# Patient Record
Sex: FEMALE | Race: WHITE | NOT HISPANIC OR LATINO | Employment: FULL TIME | URBAN - METROPOLITAN AREA
[De-identification: names, ages, dates, MRNs, and addresses within clinical notes are randomized per-mention and may not be internally consistent; named-entity substitution may affect disease eponyms.]

---

## 2017-04-10 ENCOUNTER — HOSPITAL ENCOUNTER (OUTPATIENT)
Dept: MAMMOGRAPHY | Facility: CLINIC | Age: 52
Discharge: HOME | End: 2017-04-10
Attending: NURSE PRACTITIONER | Admitting: NURSE PRACTITIONER
Payer: COMMERCIAL

## 2017-04-10 DIAGNOSIS — Z12.31 VISIT FOR SCREENING MAMMOGRAM: ICD-10-CM

## 2017-04-10 PROCEDURE — G0202 SCR MAMMO BI INCL CAD: HCPCS

## 2017-04-10 PROCEDURE — 77063 BREAST TOMOSYNTHESIS BI: CPT

## 2017-04-13 ENCOUNTER — HOSPITAL ENCOUNTER (OUTPATIENT)
Dept: ULTRASOUND IMAGING | Facility: CLINIC | Age: 52
Discharge: HOME | End: 2017-04-13
Attending: NURSE PRACTITIONER | Admitting: NURSE PRACTITIONER
Payer: COMMERCIAL

## 2017-04-13 DIAGNOSIS — R92.8 ABNORMAL MAMMOGRAM: ICD-10-CM

## 2017-04-13 PROCEDURE — 76642 ULTRASOUND BREAST LIMITED: CPT | Mod: 50

## 2017-10-18 ENCOUNTER — OFFICE VISIT (OUTPATIENT)
Dept: PODIATRY | Facility: CLINIC | Age: 52
End: 2017-10-18
Payer: COMMERCIAL

## 2017-10-18 ENCOUNTER — RADIANT APPOINTMENT (OUTPATIENT)
Dept: GENERAL RADIOLOGY | Facility: CLINIC | Age: 52
End: 2017-10-18
Attending: PODIATRIST
Payer: COMMERCIAL

## 2017-10-18 VITALS — TEMPERATURE: 96.8 F | BODY MASS INDEX: 29.23 KG/M2 | WEIGHT: 165 LBS | HEIGHT: 63 IN

## 2017-10-18 DIAGNOSIS — M20.11 HALLUX VALGUS (ACQUIRED), RIGHT FOOT: ICD-10-CM

## 2017-10-18 DIAGNOSIS — M20.11 HALLUX VALGUS (ACQUIRED), RIGHT FOOT: Primary | ICD-10-CM

## 2017-10-18 PROCEDURE — 99213 OFFICE O/P EST LOW 20 MIN: CPT | Performed by: PODIATRIST

## 2017-10-18 PROCEDURE — 73630 X-RAY EXAM OF FOOT: CPT | Mod: TC

## 2017-10-18 ASSESSMENT — PAIN SCALES - GENERAL: PAINLEVEL: EXTREME PAIN (8)

## 2017-10-18 NOTE — LETTER
10/18/2017        RE: Dot Kenny  [address] [facility] [address]        HPI:  Dot Kenny is a 52 year old female who is seen in consultation at the request of self.    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  Radiates from ankle to tip of toes, tingling, feel a bump from hardware, throbbing, pain 8  Presents today with Superfeet inserts and athletic shoes    Works as a Teacher at [employer].    Weight management plan: Patient was referred to their PCP to discuss a diet and exercise plan.     ROS:  10 point ROS neg other than the symptoms noted above in the HPI.    PAST MEDICAL HISTORY:   Past Medical History:   Diagnosis Date     ASCUS favor benign 3/2016    Neg HPV        PAST SURGICAL HISTORY:   Past Surgical History:   Procedure Laterality Date     BUNIONECTOMY Right 6/14/2016    Procedure: BUNIONECTOMY;  Surgeon: Jean Carlos Coleman DPM;  Location: [facility]     C APPENDECTOMY  2000    appendectomy         MEDICATIONS:   Current Outpatient Prescriptions:      NO ACTIVE MEDICATIONS, ., Disp: , Rfl:      order for DME, Dynaflex insert, Disp: 1 Units, Rfl: 0     order for DME, Knee Walker Length of use: Three months, Disp: 1 Units, Rfl: 0     ibuprofen (ADVIL,MOTRIN) 800 MG tablet, Take 1 tablet (800 mg) by mouth every 8 hours as needed for pain (for menstrual cramps), Disp: 30 tablet, Rfl: 3     ALLERGIES:    Allergies   Allergen Reactions     Cefzil [Cefprozil]      rash     Sulfa Drugs      Rash          SOCIAL HISTORY:   Social History     Social History     Marital status:      Spouse name: N/A     Number of children: N/A     Years of education: N/A     Occupational History     Not on file.     Social History Main Topics      "Smoking status: Never Smoker     Smokeless tobacco: Never Used     Alcohol use No     Drug use: No     Sexual activity: Yes     Partners: Male      Comment:  vasectomy     Other Topics Concern     Parent/Sibling W/ Cabg, Mi Or Angioplasty Before 65f 55m? No      Service No     Blood Transfusions No     Caffeine Concern No     Occupational Exposure No     Hobby Hazards No     Sleep Concern No     Stress Concern No     Weight Concern No     Special Diet No     Back Care No     Exercise No     Seat Belt Yes     Self-Exams Yes     Social History Narrative        FAMILY HISTORY:   Family History   Problem Relation Age of Onset     Hypertension Father      Thyroid Disease Paternal Grandfather      DIABETES No family hx of      Breast Cancer No family hx of      Cancer - colorectal No family hx of      Alcohol/Drug No family hx of      Lipids No family hx of         EXAM:Vitals: Temp 96.8  F (36  C) (Temporal)  Ht 5' 3.39\" (1.61 m)  Wt 165 lb (74.8 kg)  BMI 28.87 kg/m2  BMI= Body mass index is 28.87 kg/(m^2).    General appearance: Patient is alert and fully cooperative with history & exam.  No sign of distress is noted during the visit.     Psychiatric: Affect is pleasant & appropriate.  Patient appears motivated to improve health.     Respiratory: Breathing is regular & unlabored while sitting.     HEENT: Hearing is intact to spoken word.  Speech is clear.  No gross evidence of visual impairment that would impact ambulation.     Vascular: DP & PT pulses are intact & regular bilaterally.  No significant edema or varicosities noted.  CFT and skin temperature is normal to both lower extremities.     Neurologic: Lower extremity sensation is intact to light touch.  No evidence of weakness or contracture in the lower extremities.  No evidence of neuropathy.    Dermatologic: Skin is intact to both lower extremities with adequate texture, turgor and tone about the integument.  No paronychia or evidence of soft " tissue infection is noted.     Musculoskeletal: Patient is ambulatory without assistive device or brace.  Generalized pes valgus noted bilateral. The patient does have a subtle prominence about the plantar medial right midfoot. She has some achy or fullness discomfort with firm palpation about the proximal first metatarsal and cuneiform joint. There is no palpable induration or edema.    Radiographs: 3 views right foot demonstrate no fractured hardware. Hardware is moderately congested about the first cuneiform and base of the first metatarsal as is typical of Lapidus bunionectomy. Hardware is also congested about the cuneiform navicular joint which is also fairly typical.     ASSESSMENT:       ICD-10-CM    1. Hallux valgus (acquired), right foot M20.11 XR Foot Right G/E 3 Views        PLAN:  Reviewed patient's chart in [facility].      10/18/2017   We discussed treatment options with the patient. I recommended much improved shoe gear and written instructions dispensed. She was appreciative of this and admits that she was instructed by her surgeon to improve her shoe gear.    She also had questions about the procedure and outcome, and wonders if it was done and correctly. She notes it was the surgeon's first time with this plate. Upon reviewing preop and postop radiographs this appears to be an excellent result with appropriate reduction of intermetatarsal angle and HAV angle and excellent sesamoid position. The hardware has not fractured however she still may be developing some symptoms associated with the hardware. She also has a fairly flatfoot and some of her aching discomfort through the midfoot may be associated with a moderately flat foot. Orthotics and it improved shoe gear may be helpful for her.    We also discussed another option would be to remove the hardware as this may also reduce some discomfort and congestion about the medial cuneiform. We discussed the postoperative course and expectations. I recommended  that she follow-up with her surgeon to achieve this unless she had a strong preference otherwise.  She would like to discuss this with her family and will follow up as needed.    Chirag Ramirez DPM    Please schedule for surgery, pre op H&P, and post ops.    Surgery:  Patient Name:  Dot Kenny ([id number])  Procedure:   excision of hardware right foot  Diagnosis:    Painful retained orthopedic hardware right foot   Assistant: first assist  Surgeon:  Chirag Ramirez DPM  Anesthesia:  General or MAC  PT type:  Same Day Surgery  Time needed: 60 minutes  Patient position:  lying supine  Mini fluoro:  Yes  C-arm:  no  Equipment:  Hardware removal set with plantar Lapidus plate  Anticoagulation:  No  Vendor:  no  Surgeon Notes:     Post op appts:    5-7 days po  12-15 days po  approx 4 weeks    Expected work restrictions:  full weight bearing in post op shoe    [facility] Discussed:  NO        Sincerely,        Chirag Ramirez DPM

## 2017-10-18 NOTE — PROGRESS NOTES
HPI:  Dot Kenny is a 52 year old female who is seen in consultation at the request of self.    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  Radiates from ankle to tip of toes, tingling, feel a bump from hardware, throbbing, pain 8  Presents today with Superfeet inserts and athletic shoes    Works as a Teacher at [employer].    Weight management plan: Patient was referred to their PCP to discuss a diet and exercise plan.     ROS:  10 point ROS neg other than the symptoms noted above in the HPI.    PAST MEDICAL HISTORY:   Past Medical History:   Diagnosis Date     ASCUS favor benign 3/2016    Neg HPV        PAST SURGICAL HISTORY:   Past Surgical History:   Procedure Laterality Date     BUNIONECTOMY Right 6/14/2016    Procedure: BUNIONECTOMY;  Surgeon: Jean Carlos Coleman DPM;  Location: [facility] [place]     C APPENDECTOMY  2000    appendectomy         MEDICATIONS:   Current Outpatient Prescriptions:      NO ACTIVE MEDICATIONS, ., Disp: , Rfl:      order for DME, Dynaflex insert, Disp: 1 Units, Rfl: 0     order for DME, Knee Walker Length of use: Three months, Disp: 1 Units, Rfl: 0     ibuprofen (ADVIL,MOTRIN) 800 MG tablet, Take 1 tablet (800 mg) by mouth every 8 hours as needed for pain (for menstrual cramps), Disp: 30 tablet, Rfl: 3     ALLERGIES:    Allergies   Allergen Reactions     Cefzil [Cefprozil]      rash     Sulfa Drugs      Rash          SOCIAL HISTORY:   Social History     Social History     Marital status:      Spouse name: N/A     Number of children: N/A     Years of education: N/A     Occupational History     Not on file.     Social History Main Topics     Smoking status: Never Smoker     Smokeless tobacco: Never Used     Alcohol use No     Drug  "use: No     Sexual activity: Yes     Partners: Male      Comment:  vasectomy     Other Topics Concern     Parent/Sibling W/ Cabg, Mi Or Angioplasty Before 65f 55m? No      Service No     Blood Transfusions No     Caffeine Concern No     Occupational Exposure No     Hobby Hazards No     Sleep Concern No     Stress Concern No     Weight Concern No     Special Diet No     Back Care No     Exercise No     Seat Belt Yes     Self-Exams Yes     Social History Narrative        FAMILY HISTORY:   Family History   Problem Relation Age of Onset     Hypertension Father      Thyroid Disease Paternal Grandfather      DIABETES No family hx of      Breast Cancer No family hx of      Cancer - colorectal No family hx of      Alcohol/Drug No family hx of      Lipids No family hx of         EXAM:Vitals: Temp 96.8  F (36  C) (Temporal)  Ht 5' 3.39\" (1.61 m)  Wt 165 lb (74.8 kg)  BMI 28.87 kg/m2  BMI= Body mass index is 28.87 kg/(m^2).    General appearance: Patient is alert and fully cooperative with history & exam.  No sign of distress is noted during the visit.     Psychiatric: Affect is pleasant & appropriate.  Patient appears motivated to improve health.     Respiratory: Breathing is regular & unlabored while sitting.     HEENT: Hearing is intact to spoken word.  Speech is clear.  No gross evidence of visual impairment that would impact ambulation.     Vascular: DP & PT pulses are intact & regular bilaterally.  No significant edema or varicosities noted.  CFT and skin temperature is normal to both lower extremities.     Neurologic: Lower extremity sensation is intact to light touch.  No evidence of weakness or contracture in the lower extremities.  No evidence of neuropathy.    Dermatologic: Skin is intact to both lower extremities with adequate texture, turgor and tone about the integument.  No paronychia or evidence of soft tissue infection is noted.     Musculoskeletal: Patient is ambulatory without assistive " device or brace.  Generalized pes valgus noted bilateral. The patient does have a subtle prominence about the plantar medial right midfoot. She has some achy or fullness discomfort with firm palpation about the proximal first metatarsal and cuneiform joint. There is no palpable induration or edema.    Radiographs: 3 views right foot demonstrate no fractured hardware. Hardware is moderately congested about the first cuneiform and base of the first metatarsal as is typical of Lapidus bunionectomy. Hardware is also congested about the cuneiform navicular joint which is also fairly typical.     ASSESSMENT:       ICD-10-CM    1. Hallux valgus (acquired), right foot M20.11 XR Foot Right G/E 3 Views        PLAN:  Reviewed patient's chart in [employer].      10/18/2017   We discussed treatment options with the patient. I recommended much improved shoe gear and written instructions dispensed. She was appreciative of this and admits that she was instructed by her surgeon to improve her shoe gear.    She also had questions about the procedure and outcome, and wonders if it was done and correctly. She notes it was the surgeon's first time with this plate. Upon reviewing preop and postop radiographs this appears to be an excellent result with appropriate reduction of intermetatarsal angle and HAV angle and excellent sesamoid position. The hardware has not fractured however she still may be developing some symptoms associated with the hardware. She also has a fairly flatfoot and some of her aching discomfort through the midfoot may be associated with a moderately flat foot. Orthotics and it improved shoe gear may be helpful for her.    We also discussed another option would be to remove the hardware as this may also reduce some discomfort and congestion about the medial cuneiform. We discussed the postoperative course and expectations. I recommended that she follow-up with her surgeon to achieve this unless she had a strong preference  otherwise.  She would like to discuss this with her family and will follow up as needed.    Chirag Ramirez DPM    Please schedule for surgery, pre op H&P, and post ops.    Surgery:  Patient Name:  Dot Kenny ([id number])  Procedure:   excision of hardware right foot  Diagnosis:    Painful retained orthopedic hardware right foot   Assistant: first assist  Surgeon:  Chirag Ramirez DPM  Anesthesia:  General or MAC  PT type:  Same Day Surgery  Time needed: 60 minutes  Patient position:  lying supine  Mini fluoro:  Yes  C-arm:  no  Equipment:  Hardware removal set with plantar Lapidus plate  Anticoagulation:  No  Vendor:  no  Surgeon Notes:     Post op appts:    5-7 days po  12-15 days po  approx 4 weeks    Expected work restrictions:  full weight bearing in post op shoe    [facility] Discussed:  NO

## 2017-10-18 NOTE — NURSING NOTE
"Chief Complaint   Patient presents with     Consult     Right foot pain post surgery; DOS 6/4/2016 - Lapidus Bunionectomy Right Foot by Jean Carlos Coleman DPM       Initial Temp 96.8  F (36  C) (Temporal)  Ht 5' 3.39\" (1.61 m)  Wt 165 lb (74.8 kg)  BMI 28.87 kg/m2 Estimated body mass index is 28.87 kg/(m^2) as calculated from the following:    Height as of this encounter: 5' 3.39\" (1.61 m).    Weight as of this encounter: 165 lb (74.8 kg).  BP completed using cuff size: NA (Not Taken)  Medication Reconciliation: complete    Vonda Hoffman CMA, October 18, 2017    "

## 2017-10-18 NOTE — PATIENT INSTRUCTIONS
Reliable shoe stores: To maximize your experience and provide the best possible fit.  Be sure to show them your foot concerns and tell them Dr. Ramirez sent you.      Stores listed in bold have only athletic shoes, and stores that are not bold are mostly casual or variety of shoes    [employer]  [address] [place] [address]  [phone]    [employer] [place]  [address] [place] [address]  [phone]     [employer] [address]any [address]rie  [address] [place] [address]  [phone]    [employer]  [address] [facility]  [facility] [address]  [phone]    [employer]  [address] [place], [place] [address] [phone]    [employer]  [address] [place] [address]  [phone]                         [employer] Locations:     [address] [place] [address]   [phone]     [address] [place] [address] [address] [place] [address]   [phone]     [address] [place] [address]   [phone]     [address] [facility] [place] [address]   [phone]     [address] [place] [address]   [phone]     [address] [place] [address]   [phone]     [address] [facility] [address]   [place] [address]   [phone]     [address] [facility]   [place] [address]   [phone]             How to find reasonable shoes          The correct width    Correct Fitting    Correct Length      Foot Distortion    Posture Distortion                          Torsional Rigidity      Grasp behind the heel and underneath the foot and twist      Bad    Excessive torsion/twist in midfoot     Less torsion/twist in midfoot is better                   Heel Counter Rigidity      Grasp just above   midsole and squeeze      Bad    Soft heel counter      Good    Rigid Heel Counter      Flexion Rigidity      Grasp shoe and bend from forefoot to rearfoot

## 2017-10-18 NOTE — MR AVS SNAPSHOT
After Visit Summary   10/18/2017    Dot Kenny    MRN: [id number]           Patient Information     Date Of Birth          1965        Visit Information        Provider Department      10/18/2017 3:00 PM Chirag Ramirez, [facility]        Today's Diagnoses     Hallux valgus (acquired), right foot    -  1      Care Instructions    Reliable shoe stores: To maximize your experience and provide the best possible fit.  Be sure to show them your foot concerns and tell them Dr. Ramirez sent you.      Stores listed in bold have only athletic shoes, and stores that are not bold are mostly casual or variety of shoes    [employer]  [address] [place] [address]  [phone]    [employer]  [address] [place] [address]  [phone]    [employer]  [address] [place] [address]  [phone]    [employer]  [address] [facility]  [facility] [address]  [phone]    [employer]  [address] [place] [address]  [phone]    [employer]  [address] [place] [address]  [phone]                         [employer] Locations:     [address] [place] [address]   [phone]     [address] [place] [address]   [phone]     [address] [place] [address]   [phone]     [address] [place] [address]   [phone]     [address] [place] [address]   [phone]     [address] [place] [address]   [phone]     [address]  [place] [facility] [address]   [place] [address]   [phone]     [address]   [place], [place] [address] [phone]             How to find reasonable shoes          The correct width    Correct Fitting    Correct Length      Foot Distortion    Posture Distortion                          Torsional Rigidity      Grasp behind the heel and underneath the foot and  "twist      Bad    Excessive torsion/twist in midfoot     Less torsion/twist in midfoot is better                   Heel Counter Rigidity      Grasp just above   midsole and squeeze      Bad    Soft heel counter      Good    Rigid Heel Counter      Flexion Rigidity      Grasp shoe and bend from forefoot to rearfoot                        Follow-ups after your visit        Who to contact     If you have questions or need follow up information about today's clinic visit or your schedule please contact [facility] directly at [phone].  Normal or non-critical lab and imaging results will be communicated to you by [employer], letter or phone within 4 business days after the clinic has received the results. If you do not hear from us within 7 days, please contact the clinic through [employer] or phone. If you have a critical or abnormal lab result, we will notify you by phone as soon as possible.  Submit refill requests through [employer] or call your pharmacy and they will forward the refill request to us. Please allow 3 business days for your refill to be completed.          Additional Information About Your Visit        [employer] Information     [employer] lets you send messages to your doctor, view your test results, renew your prescriptions, schedule appointments and more. To sign up, go to [email or web address] . Click on \"Log in\" on the left side of the screen, which will take you to the Welcome page. Then click on \"Sign up Now\" on the right side of the page.     You will be asked to enter the access code listed below, as well as some personal information. Please follow the directions to create your username and password.     Your access code is: [email or web address]  Expires: 2018  3:30 PM     Your access code will  in 90 days. If you need help or a new code, please call your [facility] or [phone].        Care EveryWhere ID     This is your Care EveryWhere ID. This could be used by other " "organizations to access your [facility] medical records  [id number]        Your Vitals Were     Temperature Height BMI (Body Mass Index)             96.8  F (36  C) (Temporal) 5' 3.39\" (1.61 m) 28.87 kg/m2          Blood Pressure from Last 3 Encounters:   06/14/16 106/69   06/07/16 107/71   03/17/16 98/68    Weight from Last 3 Encounters:   10/18/17 165 lb (74.8 kg)   08/31/16 145 lb (65.8 kg)   08/03/16 145 lb (65.8 kg)               Primary Care Provider Office Phone # Fax #    Chrissy Alamo, [phone]       [address] [facility] [address]        Equal Access to Services     ABNER PIMENTEL : Juancho rosarioo Sonirmal, waaxda luqadaha, qaybta kaalmada adeegyada, luis alfredo saleh . So [facility] [phone].    ATENCIÓN: Si habla español, tiene a cade disposición servicios gratuitos de asistencia lingüística. Llame al [phone].    We comply with applicable federal civil rights laws and [place] laws. We do not discriminate on the basis of race, color, national origin, age, disability, sex, sexual orientation, or gender identity.            Thank you!     Thank you for choosing [facility]  for your care. Our goal is always to provide you with excellent care. Hearing back from our patients is one way we can continue to improve our services. Please take a few minutes to complete the written survey that you may receive in the mail after your visit with us. Thank you!             Your Updated Medication List - Protect others around you: Learn how to safely use, store and throw away your medicines at www.disposemymeds.org.          This list is accurate as of: 10/18/17  3:30 PM.  Always use your most recent med list.                   Brand Name Dispense Instructions for use Diagnosis    ibuprofen 800 MG tablet    ADVIL/MOTRIN    30 tablet    Take 1 tablet (800 mg) by mouth every 8 hours as needed for pain (for menstrual cramps)    Dysmenorrhea       NO " ACTIVE MEDICATIONS      .        * order for DME     1 Units    Knee Walker Length of use: Three months        * order for DME     1 Units    Dynaflex insert    Right foot pain, S/P foot surgery, right       * Notice:  This list has 2 medication(s) that are the same as other medications prescribed for you. Read the directions carefully, and ask your doctor or other care provider to review them with you.

## 2018-02-10 ENCOUNTER — OFFICE VISIT (OUTPATIENT)
Dept: URGENT CARE | Facility: URGENT CARE | Age: 53
End: 2018-02-10
Payer: COMMERCIAL

## 2018-02-10 VITALS
SYSTOLIC BLOOD PRESSURE: 113 MMHG | OXYGEN SATURATION: 100 % | TEMPERATURE: 97.4 F | DIASTOLIC BLOOD PRESSURE: 71 MMHG | RESPIRATION RATE: 16 BRPM | WEIGHT: 165 LBS | HEART RATE: 81 BPM | BODY MASS INDEX: 28.87 KG/M2

## 2018-02-10 DIAGNOSIS — J03.01 ACUTE RECURRENT STREPTOCOCCAL TONSILLITIS: ICD-10-CM

## 2018-02-10 DIAGNOSIS — R07.0 THROAT PAIN: Primary | ICD-10-CM

## 2018-02-10 LAB
DEPRECATED S PYO AG THROAT QL EIA: ABNORMAL
SPECIMEN SOURCE: ABNORMAL

## 2018-02-10 PROCEDURE — 99213 OFFICE O/P EST LOW 20 MIN: CPT | Performed by: PHYSICIAN ASSISTANT

## 2018-02-10 PROCEDURE — 87880 STREP A ASSAY W/OPTIC: CPT | Performed by: PHYSICIAN ASSISTANT

## 2018-02-10 RX ORDER — PENICILLIN V POTASSIUM 500 MG/1
500 TABLET, FILM COATED ORAL 3 TIMES DAILY
Qty: 30 TABLET | Refills: 0 | Status: SHIPPED | OUTPATIENT
Start: 2018-02-10 | End: 2018-02-23

## 2018-02-10 ASSESSMENT — ENCOUNTER SYMPTOMS
NAUSEA: 0
DEPRESSION: 0
FREQUENCY: 0
FEVER: 1
CONSTIPATION: 0
CHILLS: 1
DIARRHEA: 0
HEADACHES: 1
PALPITATIONS: 0
BLURRED VISION: 0
BACK PAIN: 0
WEAKNESS: 1
EYE PAIN: 0
COUGH: 1
DIZZINESS: 0
ABDOMINAL PAIN: 0
SORE THROAT: 1
DYSURIA: 0

## 2018-02-10 NOTE — MR AVS SNAPSHOT
"              After Visit Summary   2/10/2018    Dot Kenny    MRN: [id number]           Patient Information     Date Of Birth          1965        Visit Information        Provider Department      2/10/2018 9:05 AM Brent Rodriguez PA-C [facility]        Today's Diagnoses     Throat pain    -  1    Acute recurrent streptococcal tonsillitis           Follow-ups after your visit        Follow-up notes from your care team     Return if symptoms worsen or fail to improve.      Who to contact     If you have questions or need follow up information about today's clinic visit or your schedule please contact [facility] directly at [phone].  Normal or non-critical lab and imaging results will be communicated to you by MyChart, letter or phone within 4 business days after the clinic has received the results. If you do not hear from us within 7 days, please contact the clinic through MyChart or phone. If you have a critical or abnormal lab result, we will notify you by phone as soon as possible.  Submit refill requests through [employer] or call your pharmacy and they will forward the refill request to us. Please allow 3 business days for your refill to be completed.          Additional Information About Your Visit        MyChart Information     [employer] lets you send messages to your doctor, view your test results, renew your prescriptions, schedule appointments and more. To sign up, go to [email or web address] . Click on \"Log in\" on the left side of the screen, which will take you to the Welcome page. Then click on \"Sign up Now\" on the right side of the page.     You will be asked to enter the access code listed below, as well as some personal information. Please follow the directions to create your username and password.     Your access code is: [email or web address]  Expires: 2018  9:51 AM     Your access code will  in 90 days. If you need help or " a new code, please call your [facility] clinic or [phone].        Care EveryWhere ID     This is your Care EveryWhere ID. This could be used by other organizations to access your [facility] medical records  [id number]        Your Vitals Were     Pulse Temperature Respirations Pulse Oximetry BMI (Body Mass Index)       81 97.4  F (36.3  C) (Tympanic) 16 100% 28.87 kg/m2        Blood Pressure from Last 3 Encounters:   02/10/18 113/71   06/14/16 106/69   06/07/16 107/71    Weight from Last 3 Encounters:   02/10/18 165 lb (74.8 kg)   10/18/17 165 lb (74.8 kg)   08/31/16 145 lb (65.8 kg)              We Performed the Following     Strep, Rapid Screen          Today's Medication Changes          These changes are accurate as of 2/10/18  9:51 AM.  If you have any questions, ask your nurse or doctor.               Start taking these medicines.        Dose/Directions    penicillin V potassium 500 MG tablet   Commonly known as:  VEETID   Used for:  Acute recurrent streptococcal tonsillitis   Started by:  Brent Rodriguez PA-C        Dose:  500 mg   Take 1 tablet (500 mg) by mouth 3 times daily   Quantity:  30 tablet   Refills:  0            Where to get your medicines      These medications were sent to [facility] [place] [id number]     Phone:  [phone]     penicillin V potassium 500 MG tablet                Primary Care Provider Office Phone # Fax #    Chrissy Alamo [phone]       [address] [place] [id number]        Equal Access to Services     [facility]: Hadii fannie garay hadasho Soomaali, waaxda luqadaha, qaybta kaalmada luis alfredo odonnell. So [facility] [phone].    ATENCIÓN: Si habla español, tiene a cade disposición servicios gratuitos de asistencia lingüística. Llame al [phone].    We comply with applicable federal civil rights laws and [place] laws. We do not  discriminate on the basis of race, color, national origin, age, disability, sex, sexual orientation, or gender identity.            Thank you!     Thank you for choosing [facility]  for your care. Our goal is always to provide you with excellent care. Hearing back from our patients is one way we can continue to improve our services. Please take a few minutes to complete the written survey that you may receive in the mail after your visit with us. Thank you!             Your Updated Medication List - Protect others around you: Learn how to safely use, store and throw away your medicines at www.disposemymeds.org.          This list is accurate as of 2/10/18  9:51 AM.  Always use your most recent med list.                   Brand Name Dispense Instructions for use Diagnosis    ibuprofen 800 MG tablet    ADVIL/MOTRIN    30 tablet    Take 1 tablet (800 mg) by mouth every 8 hours as needed for pain (for menstrual cramps)    Dysmenorrhea       NO ACTIVE MEDICATIONS      .        * order for DME     1 Units    Knee Walker Length of use: Three months        * order for DME     1 Units    Dynaflex insert    Right foot pain, S/P foot surgery, right       penicillin V potassium 500 MG tablet    VEETID    30 tablet    Take 1 tablet (500 mg) by mouth 3 times daily    Acute recurrent streptococcal tonsillitis       * Notice:  This list has 2 medication(s) that are the same as other medications prescribed for you. Read the directions carefully, and ask your doctor or other care provider to review them with you.

## 2018-02-10 NOTE — PROGRESS NOTES
HPI    SUBJECTIVE:   Dot Kenny is a 52 year old female who presents to clinic today for sore throat and fever for the last 2 days.        Problem list and histories reviewed & adjusted, as indicated.  Additional history: as documented    Patient Active Problem List   Diagnosis     CARDIOVASCULAR SCREENING; LDL GOAL LESS THAN 160     Dysmenorrhea     ASCUS favor benign     Past Surgical History:   Procedure Laterality Date     BUNIONECTOMY Right 6/14/2016    Procedure: BUNIONECTOMY;  Surgeon: Jean Carlos Coleman DPM;  Location: [facility]     C APPENDECTOMY  2000    appendectomy        Social History   Substance Use Topics     Smoking status: Never Smoker     Smokeless tobacco: Never Used     Alcohol use No     Family History   Problem Relation Age of Onset     Hypertension Father      Thyroid Disease Paternal Grandfather      DIABETES No family hx of      Breast Cancer No family hx of      Cancer - colorectal No family hx of      Alcohol/Drug No family hx of      Lipids No family hx of          Current Outpatient Prescriptions   Medication Sig Dispense Refill     penicillin V potassium (VEETID) 500 MG tablet Take 1 tablet (500 mg) by mouth 3 times daily 30 tablet 0     ibuprofen (ADVIL,MOTRIN) 800 MG tablet Take 1 tablet (800 mg) by mouth every 8 hours as needed for pain (for menstrual cramps) 30 tablet 3     NO ACTIVE MEDICATIONS .       order for DME Dynaflex insert (Patient not taking: Reported on 2/10/2018) 1 Units 0     order for DME Knee Walker  Length of use: Three months (Patient not taking: Reported on 2/10/2018) 1 Units 0     Allergies   Allergen Reactions     Cefzil [Cefprozil]      rash     Sulfa Drugs      Rash       Labs reviewed in EPIC    Reviewed and updated as needed this visit by clinical staff  Tobacco  Allergies  Meds  Med Hx  Surg Hx  Fam Hx  Soc Hx      Reviewed and updated as needed this visit by Provider  Allergies             Review of Systems   Constitutional: Positive for chills,  fever and malaise/fatigue.   HENT: Positive for sore throat. Negative for congestion, ear pain, hearing loss and nosebleeds.    Eyes: Negative for blurred vision and pain.   Respiratory: Positive for cough.    Cardiovascular: Negative for chest pain and palpitations.   Gastrointestinal: Negative for abdominal pain, constipation, diarrhea and nausea.   Genitourinary: Negative for dysuria and frequency.   Musculoskeletal: Negative for back pain and joint pain.   Skin: Negative for rash.   Neurological: Positive for weakness and headaches. Negative for dizziness.   Psychiatric/Behavioral: Negative for depression.         Physical Exam   Constitutional: She is oriented to person, place, and time and well-developed, well-nourished, and in no distress.   HENT:   Head: Normocephalic and atraumatic.   Right Ear: Hearing, tympanic membrane, external ear and ear canal normal.   Left Ear: Hearing, tympanic membrane, external ear and ear canal normal.   Nose: Sinus tenderness present. Right sinus exhibits maxillary sinus tenderness. Left sinus exhibits maxillary sinus tenderness.   Mouth/Throat: Oropharyngeal exudate and posterior oropharyngeal erythema present. No tonsillar abscesses.   Eyes: Conjunctivae, EOM and lids are normal. Pupils are equal, round, and reactive to light.   Neck: Normal range of motion and full passive range of motion without pain. Neck supple. Carotid bruit is not present. No tracheal deviation present. No thyroid mass and no thyromegaly present.   Cardiovascular: Normal rate, regular rhythm, normal heart sounds and normal pulses.    Pulmonary/Chest: Effort normal and breath sounds normal.   Abdominal: Soft. Normal appearance. There is no tenderness.   Musculoskeletal: Normal range of motion.   Lymphadenopathy:     She has no cervical adenopathy.   Neurological: She is alert and oriented to person, place, and time.   Skin: Skin is warm, dry and intact.   Psychiatric: Mood, memory, affect and judgment  normal.       (R07.0) Throat pain  (primary encounter diagnosis)  Comment:   Plan: Strep, Rapid Screen            (J03.01) Acute recurrent streptococcal tonsillitis  Comment:  Plan: penicillin V potassium (VEETID) 500 MG tablet          Follow-up if symptoms do not resolve

## 2018-02-23 ENCOUNTER — OFFICE VISIT (OUTPATIENT)
Dept: FAMILY MEDICINE | Facility: CLINIC | Age: 53
End: 2018-02-23
Payer: COMMERCIAL

## 2018-02-23 VITALS
HEART RATE: 70 BPM | OXYGEN SATURATION: 99 % | TEMPERATURE: 96.8 F | RESPIRATION RATE: 16 BRPM | WEIGHT: 164 LBS | DIASTOLIC BLOOD PRESSURE: 72 MMHG | BODY MASS INDEX: 28.7 KG/M2 | SYSTOLIC BLOOD PRESSURE: 112 MMHG

## 2018-02-23 DIAGNOSIS — R07.0 THROAT PAIN: ICD-10-CM

## 2018-02-23 DIAGNOSIS — J02.0 STREPTOCOCCAL PHARYNGITIS: Primary | ICD-10-CM

## 2018-02-23 LAB
DEPRECATED S PYO AG THROAT QL EIA: ABNORMAL
SPECIMEN SOURCE: ABNORMAL

## 2018-02-23 PROCEDURE — 99213 OFFICE O/P EST LOW 20 MIN: CPT | Performed by: NURSE PRACTITIONER

## 2018-02-23 PROCEDURE — 87880 STREP A ASSAY W/OPTIC: CPT | Performed by: NURSE PRACTITIONER

## 2018-02-23 RX ORDER — AZITHROMYCIN 500 MG/1
500 TABLET, FILM COATED ORAL DAILY
Qty: 5 TABLET | Refills: 0 | Status: SHIPPED | OUTPATIENT
Start: 2018-02-23 | End: 2018-05-24

## 2018-02-23 NOTE — PROGRESS NOTES
SUBJECTIVE:   Dot Kenny is a 52 year old female who presents to clinic today for the following health issues:    ENT Symptoms             Symptoms: cc Present Absent Comment   Fever/Chills       Fatigue       Muscle Aches       Eye Irritation       Sneezing       Nasal Maxx/Drg       Sinus Pressure/Pain       Loss of smell       Dental pain       Sore Throat  x     Swollen Glands  x     Ear Pain/Fullness       Cough       Wheeze       Chest Pain       Shortness of breath       Rash       Other  x  Headache today      Symptom duration:  3 days    Symptom severity:  same    Treatments tried:  Tylenol this morning    Contacts:  Treated 2 weeks ago for strep, Finished antibiotics 3 days ago        Problem list and histories reviewed & adjusted, as indicated.  Additional history: as documented    Patient Active Problem List   Diagnosis     CARDIOVASCULAR SCREENING; LDL GOAL LESS THAN 160     Dysmenorrhea     ASCUS favor benign     Past Surgical History:   Procedure Laterality Date     BUNIONECTOMY Right 6/14/2016    Procedure: BUNIONECTOMY;  Surgeon: Jean Carlos Coleman DPM;  Location: [facility]     C APPENDECTOMY  2000    appendectomy        Social History   Substance Use Topics     Smoking status: Never Smoker     Smokeless tobacco: Never Used     Alcohol use No     Family History   Problem Relation Age of Onset     Hypertension Father      Thyroid Disease Paternal Grandfather      DIABETES No family hx of      Breast Cancer No family hx of      Cancer - colorectal No family hx of      Alcohol/Drug No family hx of      Lipids No family hx of          Current Outpatient Prescriptions   Medication Sig Dispense Refill     NO ACTIVE MEDICATIONS .       order for DME Dynaflex insert (Patient not taking: Reported on 2/10/2018) 1 Units 0     order for DME Knee Walker  Length of use: Three months (Patient not taking: Reported on 2/10/2018) 1 Units 0     ibuprofen (ADVIL,MOTRIN) 800 MG tablet Take 1 tablet (800 mg) by mouth  every 8 hours as needed for pain (for menstrual cramps) (Patient not taking: Reported on 2/23/2018) 30 tablet 3     Allergies   Allergen Reactions     Cefzil [Cefprozil]      rash     Sulfa Drugs      Rash         Reviewed and updated as needed this visit by clinical staff       Reviewed and updated as needed this visit by Provider         ROS:  Constitutional, HEENT, cardiovascular, pulmonary, gi and gu systems are negative, except as otherwise noted.    OBJECTIVE:     /72 (BP Location: Right arm, Cuff Size: Adult Regular)  Pulse 70  Temp 96.8  F (36  C) (Tympanic)  Resp 16  Wt 164 lb (74.4 kg)  SpO2 99%  BMI 28.7 kg/m2  Body mass index is 28.7 kg/(m^2).   GENERAL: healthy, alert and no distress  EYES: Eyes grossly normal to inspection, PERRL and conjunctivae and sclerae normal  HENT: ear canals and TM's normal, nose and mouth without ulcers or lesions  HENT: tonsillar erythema and tonsillar exudate  NECK: no adenopathy, no asymmetry, masses, or scars and thyroid normal to palpation  RESP: lungs clear to auscultation - no rales, rhonchi or wheezes  BREAST: normal without masses, tenderness or nipple discharge and no palpable axillary masses or adenopathy  CV: regular rate and rhythm, normal S1 S2, no S3 or S4, no murmur, click or rub, no peripheral edema and peripheral pulses strong  ABDOMEN: soft, nontender, no hepatosplenomegaly, no masses and bowel sounds normal  MS: no gross musculoskeletal defects noted, no edema  SKIN: no suspicious lesions or rashes  NEURO: Normal strength and tone, mentation intact and speech normal  PSYCH: mentation appears normal, affect normal/bright    Results for orders placed or performed in visit on 02/10/18   Strep, Rapid Screen   Result Value Ref Range    Specimen Description Throat     Rapid Strep A Screen (A)      POSITIVE: Group A Streptococcal antigen detected by immunoassay.         ASSESSMENT:       ICD-10-CM    1. Streptococcal pharyngitis J02.0 azithromycin  (ZITHROMAX) 500 MG tablet   2. Throat pain R07.0 Strep, Rapid Screen         PLAN:     Patient Instructions     Pharyngitis: Strep (Confirmed)    You have had a positive test for strep throat. Strep throat is a contagious illness. It is spread by coughing, kissing or by touching others after touching your mouth or nose. Symptoms include throat pain that is worse with swallowing, aching all over, headache, and fever. It is treated with antibiotic medicine. This should help you start to feel better in 1 to 2 days.  Home care    Rest at home. Drink plenty of fluids to you won't get dehydrated.    No work or school for the first 2 days of taking the antibiotics. After this time, you will not be contagious. You can then return to school or work if you are feeling better.     Take antibiotic medicine for the full 10 days, even if you feel better. This is very important to ensure the infection is treated. It is also important to prevent medicine-resistant germs from developing. If you were given an antibiotic shot, you don't need any more antibiotics.    You may use acetaminophen or ibuprofen to control pain or fever, unless another medicine was prescribed for this. Talk with your doctor before taking these medicines if you have chronic liver or kidney disease. Also talk with your doctor if you have had a stomach ulcer or GI bleeding.    Throat lozenges or sprays help reduce pain. Gargling with warm saltwater will also reduce throat pain. Dissolve 1/2 teaspoon of salt in 1 glass of warm water. This may be useful just before meals.     Soft foods are OK. Avoid salty or spicy foods.  Follow-up care  Follow up with your healthcare provider or our staff if you don't get better over the next week.  When to seek medical advice  Call your healthcare provider right away if any of these occur:    Fever of 100.4 F (38 C) or higher, or as directed by your healthcare provider    New or worsening ear pain, sinus pain, or  headache    Painful lumps in the back of neck    Stiff neck    Lymph nodes getting larger or becoming soft in the middle    You can't swallow liquids or you can't open your mouth wide because of throat pain    Signs of dehydration. These include very dark urine or no urine, sunken eyes, and dizziness.    Trouble breathing or noisy breathing    Muffled voice    Rash  Date Last Reviewed: 4/13/2015 2000-2017 [employer]. [address] [place] [address], [place] [id number]. All rights reserved. This information is not intended as a substitute for professional medical care. Always follow your healthcare professional's instructions.            [facility]

## 2018-02-23 NOTE — MR AVS SNAPSHOT
After Visit Summary   2/23/2018    Dot Kenny    MRN: [id number]           Patient Information     Date Of Birth          1965        Visit Information        Provider Department      2/23/2018 2:40 PM Latha Williamson APRN [facility]        Today's Diagnoses     Throat pain    -  1    Streptococcal pharyngitis          Care Instructions      Pharyngitis: Strep (Confirmed)    You have had a positive test for strep throat. Strep throat is a contagious illness. It is spread by coughing, kissing or by touching others after touching your mouth or nose. Symptoms include throat pain that is worse with swallowing, aching all over, headache, and fever. It is treated with antibiotic medicine. This should help you start to feel better in 1 to 2 days.  Home care    Rest at home. Drink plenty of fluids to you won't get dehydrated.    No work or school for the first 2 days of taking the antibiotics. After this time, you will not be contagious. You can then return to school or work if you are feeling better.     Take antibiotic medicine for the full 10 days, even if you feel better. This is very important to ensure the infection is treated. It is also important to prevent medicine-resistant germs from developing. If you were given an antibiotic shot, you don't need any more antibiotics.    You may use acetaminophen or ibuprofen to control pain or fever, unless another medicine was prescribed for this. Talk with your doctor before taking these medicines if you have chronic liver or kidney disease. Also talk with your doctor if you have had a stomach ulcer or GI bleeding.    Throat lozenges or sprays help reduce pain. Gargling with warm saltwater will also reduce throat pain. Dissolve 1/2 teaspoon of salt in 1 glass of warm water. This may be useful just before meals.     Soft foods are OK. Avoid salty or spicy foods.  Follow-up care  Follow up with your healthcare provider or our staff  "if you don't get better over the next week.  When to seek medical advice  Call your healthcare provider right away if any of these occur:    Fever of 100.4 F (38 C) or higher, or as directed by your healthcare provider    New or worsening ear pain, sinus pain, or headache    Painful lumps in the back of neck    Stiff neck    Lymph nodes getting larger or becoming soft in the middle    You can't swallow liquids or you can't open your mouth wide because of throat pain    Signs of dehydration. These include very dark urine or no urine, sunken eyes, and dizziness.    Trouble breathing or noisy breathing    Muffled voice    Rash  Date Last Reviewed: 4/13/2015 2000-2017 [employer]. [address] [place] [address], [place] [address]. All rights reserved. This information is not intended as a substitute for professional medical care. Always follow your healthcare professional's instructions.                Follow-ups after your visit        Who to contact     If you have questions or need follow up information about today's clinic visit or your schedule please contact [facility] directly at [phone].  Normal or non-critical lab and imaging results will be communicated to you by Ubimohart, letter or phone within 4 business days after the clinic has received the results. If you do not hear from us within 7 days, please contact the clinic through Tripbirdst or phone. If you have a critical or abnormal lab result, we will notify you by phone as soon as possible.  Submit refill requests through [email or web address] or call your pharmacy and they will forward the refill request to us. Please allow 3 business days for your refill to be completed.          Additional Information About Your Visit        UbimoharMobilisafe Information     [email or web address] lets you send messages to your doctor, view your test results, renew your prescriptions, schedule appointments and more. To sign up, go to [email or web address] . Click on \"Log in\" on " "the left side of the screen, which will take you to the Welcome page. Then click on \"Sign up Now\" on the right side of the page.     You will be asked to enter the access code listed below, as well as some personal information. Please follow the directions to create your username and password.     Your access code is: [email or web address]  Expires: 2018  9:51 AM     Your access code will  in 90 days. If you need help or a new code, please call your [facility] or [phone].        Care EveryWhere ID     This is your Care EveryWhere ID. This could be used by other organizations to access your [place] medical records  [id number]        Your Vitals Were     Pulse Temperature Respirations Pulse Oximetry BMI (Body Mass Index)       70 96.8  F (36  C) (Tympanic) 16 99% 28.7 kg/m2        Blood Pressure from Last 3 Encounters:   18 112/72   02/10/18 113/71   16 106/69    Weight from Last 3 Encounters:   18 164 lb (74.4 kg)   02/10/18 165 lb (74.8 kg)   10/18/17 165 lb (74.8 kg)              We Performed the Following     Strep, Rapid Screen          Today's Medication Changes          These changes are accurate as of 18  3:04 PM.  If you have any questions, ask your nurse or doctor.               Start taking these medicines.        Dose/Directions    azithromycin 500 MG tablet   Commonly known as:  ZITHROMAX   Used for:  Streptococcal pharyngitis   Started by:  Latha Williamson APRN CNP        Dose:  500 mg   Take 1 tablet (500 mg) by mouth daily   Quantity:  5 tablet   Refills:  0            Where to get your medicines      These medications were sent to [id number] [place], [place] - [address]  [address] [address]    Hours:  Closed 10-16-08 business to [facility] Phone:  [phone]     azithromycin 500 MG tablet                Primary Care Provider Office Phone # Fax #    Chrissy Alamo [phone]       [address] " [facility] [address]        Equal Access to Services     [facility] MARGARITO : Hadii fannie garay ernestina Magana, wawadeda luqadaha, qaybta kaalmatonny odonnell, luis alfredo ramirez. So [facility] [phone].    ATENCIÓN: Si habla español, tiene a cade disposición servicios gratuitos de asistencia lingüística. Keo al [phone].    We comply with applicable federal civil rights laws and [place] laws. We do not discriminate on the basis of race, color, national origin, age, disability, sex, sexual orientation, or gender identity.            Thank you!     Thank you for choosing [facility]  for your care. Our goal is always to provide you with excellent care. Hearing back from our patients is one way we can continue to improve our services. Please take a few minutes to complete the written survey that you may receive in the mail after your visit with us. Thank you!             Your Updated Medication List - Protect others around you: Learn how to safely use, store and throw away your medicines at www.disposemymeds.org.          This list is accurate as of 2/23/18  3:04 PM.  Always use your most recent med list.                   Brand Name Dispense Instructions for use Diagnosis    azithromycin 500 MG tablet    ZITHROMAX    5 tablet    Take 1 tablet (500 mg) by mouth daily    Streptococcal pharyngitis       ibuprofen 800 MG tablet    ADVIL/MOTRIN    30 tablet    Take 1 tablet (800 mg) by mouth every 8 hours as needed for pain (for menstrual cramps)    Dysmenorrhea       NO ACTIVE MEDICATIONS      .        * order for DME     1 Units    Knee Walker Length of use: Three months        * order for DME     1 Units    Dynaflex insert    Right foot pain, S/P foot surgery, right       * Notice:  This list has 2 medication(s) that are the same as other medications prescribed for you. Read the directions carefully, and ask your doctor or other care provider to review them with you.

## 2018-04-18 ENCOUNTER — HOSPITAL ENCOUNTER (OUTPATIENT)
Dept: MAMMOGRAPHY | Facility: CLINIC | Age: 53
Discharge: HOME | End: 2018-04-18
Attending: NURSE PRACTITIONER | Admitting: NURSE PRACTITIONER
Payer: COMMERCIAL

## 2018-04-18 DIAGNOSIS — Z12.31 VISIT FOR SCREENING MAMMOGRAM: ICD-10-CM

## 2018-04-18 PROCEDURE — 77063 BREAST TOMOSYNTHESIS BI: CPT

## 2018-05-24 ENCOUNTER — OFFICE VISIT (OUTPATIENT)
Dept: PODIATRY | Facility: CLINIC | Age: 53
End: 2018-05-24
Payer: COMMERCIAL

## 2018-05-24 ENCOUNTER — RADIANT APPOINTMENT (OUTPATIENT)
Dept: GENERAL RADIOLOGY | Facility: CLINIC | Age: 53
End: 2018-05-24
Attending: PODIATRIST
Payer: COMMERCIAL

## 2018-05-24 VITALS — DIASTOLIC BLOOD PRESSURE: 70 MMHG | HEART RATE: 72 BPM | SYSTOLIC BLOOD PRESSURE: 110 MMHG

## 2018-05-24 DIAGNOSIS — M79.671 RIGHT FOOT PAIN: ICD-10-CM

## 2018-05-24 DIAGNOSIS — M79.671 RIGHT FOOT PAIN: Primary | ICD-10-CM

## 2018-05-24 DIAGNOSIS — T84.84XA PAINFUL ORTHOPAEDIC HARDWARE (H): ICD-10-CM

## 2018-05-24 PROCEDURE — 99213 OFFICE O/P EST LOW 20 MIN: CPT | Performed by: PODIATRIST

## 2018-05-24 PROCEDURE — 73630 X-RAY EXAM OF FOOT: CPT | Mod: RT

## 2018-05-24 NOTE — PROGRESS NOTES
PATIENT HISTORY:  Dot Kenny is a 52 year old female who presents to clinic for a painful right foot .  The patient describes the pain as sharp stabbing.  The patient relates the pain level is moderate.  The patient relates pain is located along the arch of the right foot..  The patient relates the pain has been present for the past several weeks.  The patient relates pain with ambulation.  The patient has tried different shoes and arch supports with little relief.  The patient had had a bunionectomy on the right foot.      REVIEW OF SYSTEMS:  Constitutional, HEENT, cardiovascular, pulmonary, GI, , musculoskeletal, neuro, skin, endocrine and psych systems are negative, except as otherwise noted.     PAST MEDICAL HISTORY:   Past Medical History:   Diagnosis Date     ASCUS favor benign 3/2016    Neg HPV        PAST SURGICAL HISTORY:   Past Surgical History:   Procedure Laterality Date     BUNIONECTOMY Right 6/14/2016    Procedure: BUNIONECTOMY;  Surgeon: Jean Carlos Coleman DPM;  Location: [facility] OR     C APPENDECTOMY  2000    appendectomy         MEDICATIONS:   Current Outpatient Prescriptions:      ibuprofen (ADVIL,MOTRIN) 800 MG tablet, Take 1 tablet (800 mg) by mouth every 8 hours as needed for pain (for menstrual cramps) (Patient not taking: Reported on 2/23/2018), Disp: 30 tablet, Rfl: 3     NO ACTIVE MEDICATIONS, ., Disp: , Rfl:      order for DME, Dynaflex insert (Patient not taking: Reported on 2/10/2018), Disp: 1 Units, Rfl: 0     order for DME, Knee Walker Length of use: Three months (Patient not taking: Reported on 2/10/2018), Disp: 1 Units, Rfl: 0     ALLERGIES:    Allergies   Allergen Reactions     Cefzil [Cefprozil]      rash     Sulfa Drugs      Rash          SOCIAL HISTORY:   Social History     Social History     Marital status:      Spouse name: N/A     Number of children: N/A     Years of education: N/A     Occupational History     Not on file.     Social History Main Topics     Smoking  status: Never Smoker     Smokeless tobacco: Never Used     Alcohol use No     Drug use: No     Sexual activity: Yes     Partners: Male      Comment:  vasectomy     Other Topics Concern     Parent/Sibling W/ Cabg, Mi Or Angioplasty Before 65f 55m? No      Service No     Blood Transfusions No     Caffeine Concern No     Occupational Exposure No     Hobby Hazards No     Sleep Concern No     Stress Concern No     Weight Concern No     Special Diet No     Back Care No     Exercise No     Seat Belt Yes     Self-Exams Yes     Social History Narrative        FAMILY HISTORY:   Family History   Problem Relation Age of Onset     Hypertension Father      Thyroid Disease Paternal Grandfather      DIABETES No family hx of      Breast Cancer No family hx of      Cancer - colorectal No family hx of      Alcohol/Drug No family hx of      Lipids No family hx of         EXAM:Vitals: /70 (BP Location: Left arm, Patient Position: Chair, Cuff Size: Adult Regular)  Pulse 72  BMI= There is no height or weight on file to calculate BMI.        General appearance: Patient is alert and fully cooperative with history & exam.  No sign of distress is noted during the visit.     Psychiatric: Affect is pleasant & appropriate.  Patient appears motivated to improve health.     Respiratory: Breathing is regular & unlabored while sitting.     HEENT: Hearing is intact to spoken word.  Speech is clear.  No gross evidence of visual impairment that would impact ambulation.     Dermatologic: Skin is intact to both lower extremities without significant lesions, rash or abrasion.  No paronychia or evidence of soft tissue infection is noted.     Vascular: DP & PT pulses are intact & regular bilaterally.  No significant edema or varicosities noted.  CFT and skin temperature is normal to both lower extremities.     Neurologic: Lower extremity sensation is intact to light touch.  No evidence of weakness or contracture in the lower  extremities.  No evidence of neuropathy.     Musculoskeletal: Patient is ambulatory without assistive device or brace.  No gross ankle deformity noted.  No foot or ankle joint effusion is noted.  Noted pain on palpation over the plate over the first metatarsocuneiform joint  on the right.  No surrounding erythema noted.    Radiographs were evaluated including AP, lateral and medial oblique views of the right foot reveals fusion of the first metatarsal cuneiform joint with hardware intact.  No cortical erosions or periosteal elevation.  All joint margins appear stable.  There is no apparent fracture or tumor formation noted.      ASSESSMENT / PLAN:     ICD-10-CM    1. Right foot pain M79.671 XR Foot Right G/E 3 Views   2. Painful orthopaedic hardware (H) T84.84XA        I have explained to Dot  about the conditions.  We discussed the nature of the condition as well as the treatment plan and expected length of recovery.  At this time, the patient wishes to have the hardware removed.    At this point, I am recommending surgical treatment of the condition involving removal of hardware on the right foot.  I informed the patient in risks and benefits of the procedure including but not limited to infection, wound complications, swelling, pain, diminished range of motion and function, DVT and reoccurrence of condition.  The procedure will be performed under local with monitored anesthesia care.  The patient will obtain a preoperative history and physical by the primary care provider.  Consents will be reviewed and signed on the day of surgery.        Disclaimer: This note consists of symbols derived from keyboarding, dictation and/or voice recognition software. As a result, there may be errors in the script that have gone undetected. Please consider this when interpreting information found in this chart.       RUSSEL Coleman D.P.M., [email or web address].

## 2018-05-24 NOTE — LETTER
5/24/2018         RE: Dot Kenny  [address] [address]        Dear Colleague,    Thank you for referring your patient, Dot Kenny, to the [facility]. Please see a copy of my visit note below.    PATIENT HISTORY:  Dot Kenny is a 52 year old female who presents to clinic for a painful right foot .  The patient describes the pain as sharp stabbing.  The patient relates the pain level is moderate.  The patient relates pain is located along the arch of the right foot..  The patient relates the pain has been present for the past several weeks.  The patient relates pain with ambulation.  The patient has tried different shoes and arch supports with little relief.  The patient had had a bunionectomy on the right foot.      REVIEW OF SYSTEMS:  Constitutional, HEENT, cardiovascular, pulmonary, GI, , musculoskeletal, neuro, skin, endocrine and psych systems are negative, except as otherwise noted.     PAST MEDICAL HISTORY:   Past Medical History:   Diagnosis Date     ASCUS favor benign 3/2016    Neg HPV        PAST SURGICAL HISTORY:   Past Surgical History:   Procedure Laterality Date     BUNIONECTOMY Right 6/14/2016    Procedure: BUNIONECTOMY;  Surgeon: Jean Carlos Coleman DPM;  Location: [facility] OR     C APPENDECTOMY  2000    appendectomy         MEDICATIONS:   Current Outpatient Prescriptions:      ibuprofen (ADVIL,MOTRIN) 800 MG tablet, Take 1 tablet (800 mg) by mouth every 8 hours as needed for pain (for menstrual cramps) (Patient not taking: Reported on 2/23/2018), Disp: 30 tablet, Rfl: 3     NO ACTIVE MEDICATIONS, ., Disp: , Rfl:      order for DME, Dynaflex insert (Patient not taking: Reported on 2/10/2018), Disp: 1 Units, Rfl: 0     order for DME, Knee Walker Length of use: Three months (Patient not taking: Reported on 2/10/2018), Disp: 1 Units, Rfl: 0     ALLERGIES:    Allergies   Allergen Reactions     Cefzil [Cefprozil]      rash     Sulfa Drugs       Rash          SOCIAL HISTORY:   Social History     Social History     Marital status:      Spouse name: N/A     Number of children: N/A     Years of education: N/A     Occupational History     Not on file.     Social History Main Topics     Smoking status: Never Smoker     Smokeless tobacco: Never Used     Alcohol use No     Drug use: No     Sexual activity: Yes     Partners: Male      Comment:  vasectomy     Other Topics Concern     Parent/Sibling W/ Cabg, Mi Or Angioplasty Before 65f 55m? No      Service No     Blood Transfusions No     Caffeine Concern No     Occupational Exposure No     Hobby Hazards No     Sleep Concern No     Stress Concern No     Weight Concern No     Special Diet No     Back Care No     Exercise No     Seat Belt Yes     Self-Exams Yes     Social History Narrative        FAMILY HISTORY:   Family History   Problem Relation Age of Onset     Hypertension Father      Thyroid Disease Paternal Grandfather      DIABETES No family hx of      Breast Cancer No family hx of      Cancer - colorectal No family hx of      Alcohol/Drug No family hx of      Lipids No family hx of         EXAM:Vitals: /70 (BP Location: Left arm, Patient Position: Chair, Cuff Size: Adult Regular)  Pulse 72  BMI= There is no height or weight on file to calculate BMI.        General appearance: Patient is alert and fully cooperative with history & exam.  No sign of distress is noted during the visit.     Psychiatric: Affect is pleasant & appropriate.  Patient appears motivated to improve health.     Respiratory: Breathing is regular & unlabored while sitting.     HEENT: Hearing is intact to spoken word.  Speech is clear.  No gross evidence of visual impairment that would impact ambulation.     Dermatologic: Skin is intact to both lower extremities without significant lesions, rash or abrasion.  No paronychia or evidence of soft tissue infection is noted.     Vascular: DP & PT pulses are intact &  regular bilaterally.  No significant edema or varicosities noted.  CFT and skin temperature is normal to both lower extremities.     Neurologic: Lower extremity sensation is intact to light touch.  No evidence of weakness or contracture in the lower extremities.  No evidence of neuropathy.     Musculoskeletal: Patient is ambulatory without assistive device or brace.  No gross ankle deformity noted.  No foot or ankle joint effusion is noted.  Noted pain on palpation over the plate over the first metatarsocuneiform joint  on the right.  No surrounding erythema noted.    Radiographs were evaluated including AP, lateral and medial oblique views of the right foot reveals fusion of the first metatarsal cuneiform joint with hardware intact.  No cortical erosions or periosteal elevation.  All joint margins appear stable.  There is no apparent fracture or tumor formation noted.      ASSESSMENT / PLAN:     ICD-10-CM    1. Right foot pain M79.671 XR Foot Right G/E 3 Views   2. Painful orthopaedic hardware (H) T84.84XA        I have explained to Dot  about the conditions.  We discussed the nature of the condition as well as the treatment plan and expected length of recovery.  At this time, the patient wishes to have the hardware removed.    At this point, I am recommending surgical treatment of the condition involving removal of hardware on the right foot.  I informed the patient in risks and benefits of the procedure including but not limited to infection, wound complications, swelling, pain, diminished range of motion and function, DVT and reoccurrence of condition.  The procedure will be performed under local with monitored anesthesia care.  The patient will obtain a preoperative history and physical by the primary care provider.  Consents will be reviewed and signed on the day of surgery.        Disclaimer: This note consists of symbols derived from keyboarding, dictation and/or voice recognition software. As a result,  there may be errors in the script that have gone undetected. Please consider this when interpreting information found in this chart.       RUSSEL Coleman D.P.M., F.A.C.F.A.S.        Again, thank you for allowing me to participate in the care of your patient.        Sincerely,        Jean Carlos Coleman DPM

## 2018-05-24 NOTE — MR AVS SNAPSHOT
After Visit Summary   5/24/2018    Dot Kenny    MRN: [id number]           Patient Information     Date Of Birth          1965        Visit Information        Provider Department      5/24/2018 3:20 PM Jean Carlos Coleman DPM [facility]        Today's Diagnoses     Right foot pain    -  1    Painful orthopaedic hardware (H)          Care Instructions    You have elected to proceed with Surgery for removal of hardware right foot  Surgeries are performed on Tuesdays at [facility].   To schedule your surgery date please call [phone].  Please leave a message with a good time for our staff to call you back.    - Please have a date in mind for your surgery, you can feel free to leave that date on the message, and we will schedule and call back to confirm.     You can expect receive a call back the same day or on the next business day from Dr. Coleman s team to assist in the scheduling.   - We will schedule the date of your surgery.  The time will be determined a few days ahead of time.  You can expect a call from Same Day Surgery 2-3 days ahead of time with specific instructions for what time to arrive at the hospital as well as any other preparations you should take prior to surgery.    - You may need to obtain a pre-operative physical from your primary medical provider. This must be done within 30 days of your surgery date.    - We will also schedule your first post-operative appointment for a bandage and wound check for the Monday following your surgery at the [place] location.    - You may be non-weight bearing for a period of up to 6 weeks.  Options for this include: (Please indicate which you would prefer so we can provide you with an order and instructions)  o Crutches  o Walker  o Roll-a-bout knee walker.    - If you will need paperwork filled out for your employer you may drop those off at the clinic directly or you may have those faxed  to us at [phone].  Please indicate on the form the date you would like the FMLA to begin if it will not be your surgery date.    The forms are typically filled out for up to 12 weeks, however you may be cleared to return prior to that time depending on your individual healing and job requirements.              Follow-ups after your visit        Your next 10 appointments already scheduled     Jun 06, 2018  3:20 PM CDT   PHYSICAL with Mayito Dumont MD   [facility] ([facility])    [address]  [facility] [address]   [phone]              Who to contact     If you have questions or need follow up information about today's clinic visit or your schedule please contact [facility] [place] directly at [phone].  Normal or non-critical lab and imaging results will be communicated to you by MyChart, letter or phone within 4 business days after the clinic has received the results. If you do not hear from us within 7 days, please contact the clinic through MyChart or phone. If you have a critical or abnormal lab result, we will notify you by phone as soon as possible.  Submit refill requests through [email or web address] or call your pharmacy and they will forward the refill request to us. Please allow 3 business days for your refill to be completed.          Additional Information About Your Visit        Care EveryWhere ID     This is your Care EveryWhere ID. This could be used by other organizations to access your [place] medical records  [id number]        Your Vitals Were     Pulse                   72            Blood Pressure from Last 3 Encounters:   05/24/18 110/70   02/23/18 112/72   02/10/18 113/71    Weight from Last 3 Encounters:   02/23/18 164 lb (74.4 kg)   02/10/18 165 lb (74.8 kg)   10/18/17 165 lb (74.8 kg)               Primary Care Provider Office Phone # Fax #    Mayito Dumont [phone]        [address] [facility] [address]        Equal Access to Services     ROXANNISSAC MARGARITO : Hadii fannie garay marlenedandy Paolanirmal, wawadetonny richards, loganadalberto daltonmariontonny odonnell, luis alfredo rasheed aminatajennifer mimsnikkilyly ramirez. So [facility] [phone].    ATENCIÓN: Si habla español, tiene a cade disposición servicios gratuitos de asistencia lingüística. Llame al [phone].    We comply with applicable federal civil rights laws and [place] laws. We do not discriminate on the basis of race, color, national origin, age, disability, sex, sexual orientation, or gender identity.            Thank you!     Thank you for choosing [facility]  for your care. Our goal is always to provide you with excellent care. Hearing back from our patients is one way we can continue to improve our services. Please take a few minutes to complete the written survey that you may receive in the mail after your visit with us. Thank you!             Your Updated Medication List - Protect others around you: Learn how to safely use, store and throw away your medicines at www.disposemymeds.org.          This list is accurate as of 5/24/18  3:33 PM.  Always use your most recent med list.                   Brand Name Dispense Instructions for use Diagnosis    ibuprofen 800 MG tablet    ADVIL/MOTRIN    30 tablet    Take 1 tablet (800 mg) by mouth every 8 hours as needed for pain (for menstrual cramps)    Dysmenorrhea       NO ACTIVE MEDICATIONS      .        * order for DME     1 Units    Knee Walker Length of use: Three months        * order for DME     1 Units    Dynaflex insert    Right foot pain, S/P foot surgery, right       * Notice:  This list has 2 medication(s) that are the same as other medications prescribed for you. Read the directions carefully, and ask your doctor or other care provider to review them with you.

## 2018-05-24 NOTE — NURSING NOTE
"Chief Complaint   Patient presents with     Foot Problems     right foot pain - hx of bunionectomy DOS 6/14/16 - thinking it's time for the hardware to come out       Initial /70 (BP Location: Left arm, Patient Position: Chair, Cuff Size: Adult Regular)  Pulse 72 Estimated body mass index is 28.7 kg/(m^2) as calculated from the following:    Height as of 10/18/17: 5' 3.39\" (1.61 m).    Weight as of 2/23/18: 164 lb (74.4 kg).  Medications and allergies reviewed.    Giovanna TRENT, LUKE    "

## 2018-05-24 NOTE — PATIENT INSTRUCTIONS
You have elected to proceed with Surgery for removal of hardware right foot  Surgeries are performed on Tuesdays at [facility].   To schedule your surgery date please call [phone].  Please leave a message with a good time for our staff to call you back.    - Please have a date in mind for your surgery, you can feel free to leave that date on the message, and we will schedule and call back to confirm.     You can expect receive a call back the same day or on the next business day from Dr. Coleman s team to assist in the scheduling.   - We will schedule the date of your surgery.  The time will be determined a few days ahead of time.  You can expect a call from Same Day Surgery 2-3 days ahead of time with specific instructions for what time to arrive at the hospital as well as any other preparations you should take prior to surgery.    - You may need to obtain a pre-operative physical from your primary medical provider. This must be done within 30 days of your surgery date.    - We will also schedule your first post-operative appointment for a bandage and wound check for the Monday following your surgery at the [facility].    - You may be non-weight bearing for a period of up to 6 weeks.  Options for this include: (Please indicate which you would prefer so we can provide you with an order and instructions)  o Crutches  o Walker  o Roll-a-bout knee walker.    - If you will need paperwork filled out for your employer you may drop those off at the clinic directly or you may have those faxed to us at [phone].  Please indicate on the form the date you would like the [place] to begin if it will not be your surgery date.    The forms are typically filled out for up to 12 weeks, however you may be cleared to return prior to that time depending on your individual healing and job requirements.

## 2018-05-30 ENCOUNTER — TELEPHONE (OUTPATIENT)
Dept: PODIATRY | Facility: CLINIC | Age: 53
End: 2018-05-30

## 2018-05-30 NOTE — TELEPHONE ENCOUNTER
Type of surgery: removal of hardware right foot  Location of surgery: [place] OR  Date and time of surgery: 6-12-18  Surgeon: Leonel MILLS  Pre-Op Appt Date: 6-6-18 @ 3:20 Mayito Dumont MD  Post-Op Appt Date: 6-18-18 @ 9:00 am  Packet sent out: Not Applicable  Pre-cert/Authorization completed:  Not Applicable  Date: 5-30-18

## 2018-05-31 ENCOUNTER — ANESTHESIA EVENT (OUTPATIENT)
Dept: SURGERY | Facility: CLINIC | Age: 53
End: 2018-05-31
Payer: COMMERCIAL

## 2018-05-31 NOTE — TELEPHONE ENCOUNTER
Patient called to reschedule surgery to June 5th.    Spoke with Rosalia and patient was rescheduled     Pre-op rescheduled for 6-1-18 @ 3:20 Pm Dr Post    Post op rescheduled for 6-11-18 2:40 pm

## 2018-06-01 ENCOUNTER — OFFICE VISIT (OUTPATIENT)
Dept: FAMILY MEDICINE | Facility: CLINIC | Age: 53
End: 2018-06-01
Payer: COMMERCIAL

## 2018-06-01 VITALS
OXYGEN SATURATION: 94 % | HEART RATE: 73 BPM | DIASTOLIC BLOOD PRESSURE: 60 MMHG | TEMPERATURE: 98 F | SYSTOLIC BLOOD PRESSURE: 99 MMHG | RESPIRATION RATE: 16 BRPM | WEIGHT: 170.4 LBS | HEIGHT: 64 IN | BODY MASS INDEX: 29.09 KG/M2

## 2018-06-01 DIAGNOSIS — Z01.818 PREOP GENERAL PHYSICAL EXAM: Primary | ICD-10-CM

## 2018-06-01 DIAGNOSIS — Z98.890 HISTORY OF BUNIONECTOMY OF RIGHT GREAT TOE: ICD-10-CM

## 2018-06-01 DIAGNOSIS — T84.84XA PAINFUL ORTHOPAEDIC HARDWARE (H): ICD-10-CM

## 2018-06-01 PROCEDURE — 99214 OFFICE O/P EST MOD 30 MIN: CPT | Performed by: FAMILY MEDICINE

## 2018-06-01 ASSESSMENT — PAIN SCALES - GENERAL: PAINLEVEL: NO PAIN (0)

## 2018-06-01 NOTE — ANESTHESIA PREPROCEDURE EVALUATION
Anesthesia Evaluation     . Pt has had prior anesthetic. Type: General           ROS/MED HX    ENT/Pulmonary:  - neg pulmonary ROS     Neurologic:  - neg neurologic ROS     Cardiovascular:     (+) Dyslipidemia, ----. : . . . :. .       METS/Exercise Tolerance:  >4 METS   Hematologic:  - neg hematologic  ROS       Musculoskeletal:  - neg musculoskeletal ROS       GI/Hepatic:  - neg GI/hepatic ROS       Renal/Genitourinary:  - ROS Renal section negative       Endo:  - neg endo ROS       Psychiatric:  - neg psychiatric ROS       Infectious Disease:  - neg infectious disease ROS       Malignancy:      - no malignancy   Other:    - neg other ROS                 Physical Exam  Normal systems: cardiovascular, pulmonary and dental    Airway   Mallampati: I  TM distance: >3 FB  Neck ROM: full    Dental     Cardiovascular       Pulmonary                     Anesthesia Plan      History & Physical Review  History and physical reviewed and following examination; no interval change.    ASA Status:  1 .    NPO Status:  > 6 hours    Plan for MAC with Propofol and Intravenous induction. Reason for MAC:  Deep or markedly invasive procedure (G8)  PONV prophylaxis:  Ondansetron (or other 5HT-3) and Dexamethasone or Solumedrol       Postoperative Care  Postoperative pain management:  IV analgesics, Oral pain medications and Multi-modal analgesia.      Consents  Anesthetic plan, risks, benefits and alternatives discussed with:  Patient..                          .

## 2018-06-01 NOTE — MR AVS SNAPSHOT
After Visit Summary   6/1/2018    Dot Kenny    MRN: [id number]           Patient Information     Date Of Birth          1965        Visit Information        Provider Department      6/1/2018 3:20 PM Post, JONO Solitario MD [facility]        Today's Diagnoses     Preop general physical exam    -  1      Care Instructions      Before Your Surgery      Call your surgeon if there is any change in your health. This includes signs of a cold or flu (such as a sore throat, runny nose, cough, rash or fever).    Do not smoke, drink alcohol or take over the counter medicine (unless your surgeon or primary care doctor tells you to) for the 24 hours before and after surgery.    If you take prescribed drugs: Follow your doctor s orders about which medicines to take and which to stop until after surgery.    Eating and drinking prior to surgery: follow the instructions from your surgeon    Take a shower or bath the night before surgery. Use the soap your surgeon gave you to gently clean your skin. If you do not have soap from your surgeon, use your regular soap. Do not shave or scrub the surgery site.  Wear clean pajamas and have clean sheets on your bed.           Follow-ups after your visit        Your next 10 appointments already scheduled     Jun 05, 2018   Procedure with Jean Carlos Coleman DPM   [facility] (--)    [address] [place] [address]   [phone]           The medical center is located at [address] (between [address] in [place], [place] miles north of [place]).            Jun 06, 2018  3:20 PM CDT   Pre-Op physical with Mayito Dumont MD   [facility] ([facility])    [address]  [facility] [address]   [phone]            Jun 11, 2018  2:40 PM CDT   Return Visit with Jean Carlos Coleman DPM   [facility] [place] ([facility])     "[address]  [address]  [facility] [address] [phone]              Who to contact     If you have questions or need follow up information about today's clinic visit or your schedule please contact [facility] directly at [phone].  Normal or non-critical lab and imaging results will be communicated to you by MyChart, letter or phone within 4 business days after the clinic has received the results. If you do not hear from us within 7 days, please contact the clinic through MyChart or phone. If you have a critical or abnormal lab result, we will notify you by phone as soon as possible.  Submit refill requests through [employer]IT or call your pharmacy and they will forward the refill request to us. Please allow 3 business days for your refill to be completed.          Additional Information About Your Visit        Care EveryWhere ID     This is your Care EveryWhere ID. This could be used by other organizations to access your [place] medical records  [id number]        Your Vitals Were     Pulse Temperature Respirations Height Pulse Oximetry BMI (Body Mass Index)    73 98  F (36.7  C) (Tympanic) 16 5' 4.25\" (1.632 m) 94% 29.02 kg/m2       Blood Pressure from Last 3 Encounters:   06/01/18 99/60   05/24/18 110/70   02/23/18 112/72    Weight from Last 3 Encounters:   06/01/18 170 lb 6.4 oz (77.3 kg)   02/23/18 164 lb (74.4 kg)   02/10/18 165 lb (74.8 kg)              Today, you had the following     No orders found for display       Primary Care Provider Office Phone # Fax #    Mayito Dumont [phone]       [address] [facility] [address]        Equal Access to Services     ABNER PIMENTEL AH: Juancho Magana, rhianna richards, janie kaalmada blas, luis alfredo ramirez. So [facility] [phone].    ATENCIÓN: Si habla español, tiene a cade disposición servicios gratuitos de asistencia lingüística. Llame al " [phone].    We comply with applicable federal civil rights laws and [place] laws. We do not discriminate on the basis of race, color, national origin, age, disability, sex, sexual orientation, or gender identity.            Thank you!     Thank you for choosing [facility]  for your care. Our goal is always to provide you with excellent care. Hearing back from our patients is one way we can continue to improve our services. Please take a few minutes to complete the written survey that you may receive in the mail after your visit with us. Thank you!             Your Updated Medication List - Protect others around you: Learn how to safely use, store and throw away your medicines at www.disposemymeds.org.          This list is accurate as of 6/1/18  3:53 PM.  Always use your most recent med list.                   Brand Name Dispense Instructions for use Diagnosis    NO ACTIVE MEDICATIONS      .

## 2018-06-01 NOTE — PROGRESS NOTES
[facility]  [address] [facility] [facility] [address]  [phone]  Dept: [phone]    PRE-OP EVALUATION:  Today's date: 2018    Dot Kenny (: 1965) presents for pre-operative evaluation assessment as requested by Dr. Coleman.  She requires evaluation and anesthesia risk assessment prior to undergoing surgery/procedure for treatment of painful hardware, right foot .    Proposed Surgery/ Procedure:        Removal of Hardware Right Foot         Date of Surgery/ Procedure: 2018  Time of Surgery/ Procedure: 8:30 am  Hospital/Surgical Facility: [facility]    Primary Physician: Mayito Dumont  Type of Anesthesia Anticipated: monitor anesthesia care    Patient has a Health Care Directive or Living Will:  NO    1. NO - Do you have a history of heart attack, stroke, stent, bypass or surgery on an artery in the head, neck, heart or legs?  2. NO - Do you ever have any pain or discomfort in your chest?  3. NO - Do you have a history of  Heart Failure?  4. NO - Are you troubled by shortness of breath when: walking on the level, up a slight hill or at night?  5. NO - Do you currently have a cold, bronchitis or other respiratory infection?  6. NO - Do you have a cough, shortness of breath or wheezing?  7. NO - Do you sometimes get pains in the calves of your legs when you walk?  8. NO - Do you or anyone in your family have previous history of blood clots?  9. NO - Do you or does anyone in your family have a serious bleeding problem such as prolonged bleeding following surgeries or cuts?  10. Yes - Have you ever had problems with anemia or been told to take iron pills?  11. NO - Have you had any abnormal blood loss such as black, tarry or bloody stools, or abnormal vaginal bleeding?  12. NO - Have you ever had a blood transfusion?  13. NO - Have you or any of your relatives ever had problems with anesthesia?  14. NO - Do you have sleep apnea, excessive snoring or daytime  drowsiness?  15. NO - Do you have any prosthetic heart valves?  16. NO - Do you have prosthetic joints?  17. NO - Is there any chance that you may be pregnant?      HPI:     HPI related to upcoming procedure: In June 2016 she underwent a successful bunionectomy on her right foot.  She was doing well until the last few months started noticing a prominent bump over the hardware from her surgery.  The screw seems to be working itself out, so Dr. Coleman recommends removal of the hardware      See problem list for active medical problems.  Problems all longstanding and stable, except as noted/documented.  See ROS for pertinent symptoms related to these conditions.                                                                                                                                                          .    MEDICAL HISTORY:     Patient Active Problem List    Diagnosis Date Noted     ASCUS favor benign 03/17/2016     Priority: Medium     3/17/16: Pap - ASCUS, Neg HPV. Plan cotest in 3 years.        Dysmenorrhea 11/24/2015     Priority: Medium     CARDIOVASCULAR SCREENING; LDL GOAL LESS THAN 160 10/31/2010     Priority: Medium      Past Medical History:   Diagnosis Date     ASCUS favor benign 3/2016    Neg HPV     Past Surgical History:   Procedure Laterality Date     BUNIONECTOMY Right 6/14/2016    Procedure: BUNIONECTOMY;  Surgeon: Jean Carlos Coleman DPM;  Location: [facility] APPENDECTOMY  2000    appendectomy      Current Outpatient Prescriptions   Medication Sig Dispense Refill     NO ACTIVE MEDICATIONS .       OTC products: None    Allergies   Allergen Reactions     Cefzil [Cefprozil]      rash     Sulfa Drugs      Rash        Latex Allergy: NO    Social History   Substance Use Topics     Smoking status: Never Smoker     Smokeless tobacco: Never Used     Alcohol use No     History   Drug Use No       REVIEW OF SYSTEMS:   CONSTITUTIONAL: NEGATIVE for fever, chills, change in weight  ENT/MOUTH: NEGATIVE  "for ear, mouth and throat problems  RESP: NEGATIVE for significant cough or SOB  CV: NEGATIVE for chest pain, palpitations or peripheral edema    EXAM:   BP 99/60 (BP Location: Right arm, Patient Position: Chair, Cuff Size: Adult Regular)  Pulse 73  Temp 98  F (36.7  C) (Tympanic)  Resp 16  Ht 5' 4.25\" (1.632 m)  Wt 170 lb 6.4 oz (77.3 kg)  SpO2 94%  BMI 29.02 kg/m2  GENERAL APPEARANCE: healthy, alert and no distress  HENT: ear canals and TM's normal and nose and mouth without ulcers or lesions  RESP: lungs clear to auscultation - no rales, rhonchi or wheezes  CV: regular rate and rhythm, normal S1 S2, no S3 or S4 and no murmur, click or rub   ABDOMEN: soft, nontender, no HSM or masses and bowel sounds normal  NEURO: Normal strength and tone, sensory exam grossly normal, mentation intact and speech normal    DIAGNOSTICS:   No labs or EKG required for low risk surgery (cataract, skin procedure, breast biopsy, etc)    Recent Labs   Lab Test  03/17/16   0914   HGB  12.5   PLT  228   NA  139   POTASSIUM  4.2   CR  0.84        IMPRESSION:   Reason for surgery/procedure: Painful hardware right foot  Diagnosis/reason for consult: Evaluate for anesthesia risk    The proposed surgical procedure is considered LOW risk.    REVISED CARDIAC RISK INDEX  The patient has the following serious cardiovascular risks for perioperative complications such as (MI, PE, VFib and 3  AV Block):  No serious cardiac risks  INTERPRETATION: 0 risks: Class I (very low risk - 0.4% complication rate)    The patient has the following additional risks for perioperative complications:  No identified additional risks      ICD-10-CM    1. Preop general physical exam Z01.818    2. Painful orthopaedic hardware (H) T84.84XA    3. History of bunionectomy of right great toe Z98.890        RECOMMENDATIONS:             APPROVAL GIVEN to proceed with proposed procedure, without further diagnostic evaluation       Signed Electronically by: JONO Harman, " MD    Copy of this evaluation report is provided to requesting physician.    [place] Preop Guidelines    Revised Cardiac Risk Index

## 2018-06-05 ENCOUNTER — ANESTHESIA (OUTPATIENT)
Dept: SURGERY | Facility: CLINIC | Age: 53
End: 2018-06-05
Payer: COMMERCIAL

## 2018-06-05 ENCOUNTER — SURGERY (OUTPATIENT)
Age: 53
End: 2018-06-05

## 2018-06-05 ENCOUNTER — HOSPITAL ENCOUNTER (OUTPATIENT)
Facility: CLINIC | Age: 53
Discharge: HOME | End: 2018-06-05
Attending: PODIATRIST | Admitting: PODIATRIST
Payer: COMMERCIAL

## 2018-06-05 VITALS
WEIGHT: 170 LBS | RESPIRATION RATE: 16 BRPM | SYSTOLIC BLOOD PRESSURE: 87 MMHG | OXYGEN SATURATION: 99 % | TEMPERATURE: 98.2 F | HEIGHT: 64 IN | BODY MASS INDEX: 29.02 KG/M2 | DIASTOLIC BLOOD PRESSURE: 71 MMHG

## 2018-06-05 DIAGNOSIS — T84.84XA PAINFUL ORTHOPAEDIC HARDWARE (H): Primary | ICD-10-CM

## 2018-06-05 LAB — HCG UR QL: NEGATIVE

## 2018-06-05 PROCEDURE — 40000306 ZZH STATISTIC PRE PROC ASSESS II: Performed by: PODIATRIST

## 2018-06-05 PROCEDURE — 20680 REMOVAL OF IMPLANT DEEP: CPT | Performed by: PODIATRIST

## 2018-06-05 PROCEDURE — 37000009 ZZH ANESTHESIA TECHNICAL FEE, EACH ADDTL 15 MIN: Performed by: PODIATRIST

## 2018-06-05 PROCEDURE — 25000128 H RX IP 250 OP 636: Performed by: NURSE ANESTHETIST, CERTIFIED REGISTERED

## 2018-06-05 PROCEDURE — 81025 URINE PREGNANCY TEST: CPT | Performed by: PODIATRIST

## 2018-06-05 PROCEDURE — 25000125 ZZHC RX 250: Performed by: PODIATRIST

## 2018-06-05 PROCEDURE — 37000008 ZZH ANESTHESIA TECHNICAL FEE, 1ST 30 MIN: Performed by: PODIATRIST

## 2018-06-05 PROCEDURE — 36000052 ZZH SURGERY LEVEL 2 EA 15 ADDTL MIN: Performed by: PODIATRIST

## 2018-06-05 PROCEDURE — 25000125 ZZHC RX 250: Performed by: NURSE ANESTHETIST, CERTIFIED REGISTERED

## 2018-06-05 PROCEDURE — 25000132 ZZH RX MED GY IP 250 OP 250 PS 637: Performed by: NURSE ANESTHETIST, CERTIFIED REGISTERED

## 2018-06-05 PROCEDURE — 36000054 ZZH SURGERY LEVEL 2 W FLUORO 1ST 30 MIN: Performed by: PODIATRIST

## 2018-06-05 PROCEDURE — 27210794 ZZH OR GENERAL SUPPLY STERILE: Performed by: PODIATRIST

## 2018-06-05 PROCEDURE — 71000027 ZZH RECOVERY PHASE 2 EACH 15 MINS: Performed by: PODIATRIST

## 2018-06-05 RX ORDER — SODIUM CHLORIDE, SODIUM LACTATE, POTASSIUM CHLORIDE, CALCIUM CHLORIDE 600; 310; 30; 20 MG/100ML; MG/100ML; MG/100ML; MG/100ML
INJECTION, SOLUTION INTRAVENOUS CONTINUOUS
Status: DISCONTINUED | OUTPATIENT
Start: 2018-06-05 | End: 2018-06-05 | Stop reason: HOSPADM

## 2018-06-05 RX ORDER — OXYCODONE HYDROCHLORIDE 5 MG/1
5 TABLET ORAL
Status: DISCONTINUED | OUTPATIENT
Start: 2018-06-05 | End: 2018-06-05 | Stop reason: HOSPADM

## 2018-06-05 RX ORDER — PROPOFOL 10 MG/ML
INJECTION, EMULSION INTRAVENOUS PRN
Status: DISCONTINUED | OUTPATIENT
Start: 2018-06-05 | End: 2018-06-05

## 2018-06-05 RX ORDER — DEXAMETHASONE SODIUM PHOSPHATE 4 MG/ML
4 INJECTION, SOLUTION INTRA-ARTICULAR; INTRALESIONAL; INTRAMUSCULAR; INTRAVENOUS; SOFT TISSUE EVERY 10 MIN PRN
Status: DISCONTINUED | OUTPATIENT
Start: 2018-06-05 | End: 2018-06-05 | Stop reason: HOSPADM

## 2018-06-05 RX ORDER — HYDROXYZINE HYDROCHLORIDE 25 MG/1
25 TABLET, FILM COATED ORAL
Status: DISCONTINUED | OUTPATIENT
Start: 2018-06-05 | End: 2018-06-05 | Stop reason: HOSPADM

## 2018-06-05 RX ORDER — LIDOCAINE HYDROCHLORIDE 10 MG/ML
INJECTION, SOLUTION INFILTRATION; PERINEURAL PRN
Status: DISCONTINUED | OUTPATIENT
Start: 2018-06-05 | End: 2018-06-05

## 2018-06-05 RX ORDER — KETOROLAC TROMETHAMINE 30 MG/ML
INJECTION, SOLUTION INTRAMUSCULAR; INTRAVENOUS PRN
Status: DISCONTINUED | OUTPATIENT
Start: 2018-06-05 | End: 2018-06-05

## 2018-06-05 RX ORDER — NALOXONE HYDROCHLORIDE 0.4 MG/ML
.1-.4 INJECTION, SOLUTION INTRAMUSCULAR; INTRAVENOUS; SUBCUTANEOUS
Status: DISCONTINUED | OUTPATIENT
Start: 2018-06-05 | End: 2018-06-05 | Stop reason: HOSPADM

## 2018-06-05 RX ORDER — LIDOCAINE HYDROCHLORIDE 10 MG/ML
INJECTION, SOLUTION INFILTRATION; PERINEURAL PRN
Status: DISCONTINUED | OUTPATIENT
Start: 2018-06-05 | End: 2018-06-05 | Stop reason: HOSPADM

## 2018-06-05 RX ORDER — HYDROMORPHONE HYDROCHLORIDE 1 MG/ML
.3-.5 INJECTION, SOLUTION INTRAMUSCULAR; INTRAVENOUS; SUBCUTANEOUS EVERY 10 MIN PRN
Status: DISCONTINUED | OUTPATIENT
Start: 2018-06-05 | End: 2018-06-05 | Stop reason: HOSPADM

## 2018-06-05 RX ORDER — PROPOFOL 10 MG/ML
INJECTION, EMULSION INTRAVENOUS CONTINUOUS PRN
Status: DISCONTINUED | OUTPATIENT
Start: 2018-06-05 | End: 2018-06-05

## 2018-06-05 RX ORDER — DEXAMETHASONE SODIUM PHOSPHATE 4 MG/ML
INJECTION, SOLUTION INTRA-ARTICULAR; INTRALESIONAL; INTRAMUSCULAR; INTRAVENOUS; SOFT TISSUE PRN
Status: DISCONTINUED | OUTPATIENT
Start: 2018-06-05 | End: 2018-06-05

## 2018-06-05 RX ORDER — CLINDAMYCIN PHOSPHATE 900 MG/50ML
900 INJECTION, SOLUTION INTRAVENOUS
Status: COMPLETED | OUTPATIENT
Start: 2018-06-05 | End: 2018-06-05

## 2018-06-05 RX ORDER — BACITRACIN ZINC 500 [USP'U]/G
OINTMENT TOPICAL PRN
Status: DISCONTINUED | OUTPATIENT
Start: 2018-06-05 | End: 2018-06-05 | Stop reason: HOSPADM

## 2018-06-05 RX ORDER — ONDANSETRON 4 MG/1
4 TABLET, ORALLY DISINTEGRATING ORAL EVERY 30 MIN PRN
Status: DISCONTINUED | OUTPATIENT
Start: 2018-06-05 | End: 2018-06-05 | Stop reason: HOSPADM

## 2018-06-05 RX ORDER — FENTANYL CITRATE 50 UG/ML
25-50 INJECTION, SOLUTION INTRAMUSCULAR; INTRAVENOUS
Status: DISCONTINUED | OUTPATIENT
Start: 2018-06-05 | End: 2018-06-05 | Stop reason: HOSPADM

## 2018-06-05 RX ORDER — OXYCODONE AND ACETAMINOPHEN 5; 325 MG/1; MG/1
1-2 TABLET ORAL EVERY 4 HOURS PRN
Qty: 60 TABLET | Refills: 0 | Status: SHIPPED | OUTPATIENT
Start: 2018-06-05 | End: 2018-06-06

## 2018-06-05 RX ORDER — HYDROXYZINE HYDROCHLORIDE 25 MG/1
25 TABLET, FILM COATED ORAL EVERY 6 HOURS PRN
Qty: 30 TABLET | Refills: 0 | Status: SHIPPED | OUTPATIENT
Start: 2018-06-05 | End: 2018-06-06

## 2018-06-05 RX ORDER — AMOXICILLIN 250 MG
1-2 CAPSULE ORAL 2 TIMES DAILY
Qty: 30 TABLET | Refills: 0 | Status: SHIPPED | OUTPATIENT
Start: 2018-06-05 | End: 2018-06-06

## 2018-06-05 RX ORDER — MEPERIDINE HYDROCHLORIDE 25 MG/ML
12.5 INJECTION INTRAMUSCULAR; INTRAVENOUS; SUBCUTANEOUS
Status: DISCONTINUED | OUTPATIENT
Start: 2018-06-05 | End: 2018-06-05 | Stop reason: HOSPADM

## 2018-06-05 RX ORDER — ONDANSETRON 2 MG/ML
INJECTION INTRAMUSCULAR; INTRAVENOUS PRN
Status: DISCONTINUED | OUTPATIENT
Start: 2018-06-05 | End: 2018-06-05

## 2018-06-05 RX ORDER — CLINDAMYCIN PHOSPHATE 900 MG/50ML
900 INJECTION, SOLUTION INTRAVENOUS SEE ADMIN INSTRUCTIONS
Status: DISCONTINUED | OUTPATIENT
Start: 2018-06-05 | End: 2018-06-05 | Stop reason: HOSPADM

## 2018-06-05 RX ORDER — BUPIVACAINE HYDROCHLORIDE 2.5 MG/ML
INJECTION, SOLUTION INFILTRATION; PERINEURAL PRN
Status: DISCONTINUED | OUTPATIENT
Start: 2018-06-05 | End: 2018-06-05 | Stop reason: HOSPADM

## 2018-06-05 RX ORDER — LIDOCAINE 40 MG/G
CREAM TOPICAL
Status: DISCONTINUED | OUTPATIENT
Start: 2018-06-05 | End: 2018-06-05 | Stop reason: HOSPADM

## 2018-06-05 RX ORDER — FENTANYL CITRATE 50 UG/ML
INJECTION, SOLUTION INTRAMUSCULAR; INTRAVENOUS PRN
Status: DISCONTINUED | OUTPATIENT
Start: 2018-06-05 | End: 2018-06-05

## 2018-06-05 RX ORDER — ONDANSETRON 2 MG/ML
4 INJECTION INTRAMUSCULAR; INTRAVENOUS EVERY 30 MIN PRN
Status: DISCONTINUED | OUTPATIENT
Start: 2018-06-05 | End: 2018-06-05 | Stop reason: HOSPADM

## 2018-06-05 RX ORDER — GABAPENTIN 300 MG/1
300 CAPSULE ORAL ONCE
Status: COMPLETED | OUTPATIENT
Start: 2018-06-05 | End: 2018-06-05

## 2018-06-05 RX ORDER — ACETAMINOPHEN 325 MG/1
975 TABLET ORAL ONCE
Status: COMPLETED | OUTPATIENT
Start: 2018-06-05 | End: 2018-06-05

## 2018-06-05 RX ADMIN — PROPOFOL 150 MCG/KG/MIN: 10 INJECTION, EMULSION INTRAVENOUS at 08:35

## 2018-06-05 RX ADMIN — GABAPENTIN 300 MG: 300 CAPSULE ORAL at 07:48

## 2018-06-05 RX ADMIN — SODIUM CHLORIDE, POTASSIUM CHLORIDE, SODIUM LACTATE AND CALCIUM CHLORIDE: 600; 310; 30; 20 INJECTION, SOLUTION INTRAVENOUS at 07:47

## 2018-06-05 RX ADMIN — ACETAMINOPHEN 975 MG: 325 TABLET, FILM COATED ORAL at 07:47

## 2018-06-05 RX ADMIN — KETOROLAC TROMETHAMINE 30 MG: 30 INJECTION, SOLUTION INTRAMUSCULAR at 08:35

## 2018-06-05 RX ADMIN — BUPIVACAINE HYDROCHLORIDE 10 ML: 2.5 INJECTION, SOLUTION EPIDURAL; INFILTRATION; INTRACAUDAL; PERINEURAL at 09:09

## 2018-06-05 RX ADMIN — MIDAZOLAM 2 MG: 1 INJECTION INTRAMUSCULAR; INTRAVENOUS at 08:28

## 2018-06-05 RX ADMIN — LIDOCAINE HYDROCHLORIDE 20 ML: 10 INJECTION, SOLUTION INFILTRATION; PERINEURAL at 08:40

## 2018-06-05 RX ADMIN — LIDOCAINE HYDROCHLORIDE 1 ML: 10 INJECTION, SOLUTION EPIDURAL; INFILTRATION; INTRACAUDAL; PERINEURAL at 07:47

## 2018-06-05 RX ADMIN — OSELTAMIVIR PHOSPHATE 7 G: 75 CAPSULE ORAL at 09:09

## 2018-06-05 RX ADMIN — ONDANSETRON 4 MG: 2 INJECTION INTRAMUSCULAR; INTRAVENOUS at 08:35

## 2018-06-05 RX ADMIN — LIDOCAINE HYDROCHLORIDE 50 MG: 10 INJECTION, SOLUTION INFILTRATION; PERINEURAL at 08:35

## 2018-06-05 RX ADMIN — PROPOFOL 40 MG: 10 INJECTION, EMULSION INTRAVENOUS at 08:54

## 2018-06-05 RX ADMIN — HYDROXYZINE HYDROCHLORIDE 25 MG: 25 TABLET ORAL at 10:01

## 2018-06-05 RX ADMIN — CLINDAMYCIN PHOSPHATE 900 MG: 18 INJECTION, SOLUTION INTRAVENOUS at 08:28

## 2018-06-05 RX ADMIN — DEXAMETHASONE SODIUM PHOSPHATE 10 MG: 4 INJECTION, SOLUTION INTRA-ARTICULAR; INTRALESIONAL; INTRAMUSCULAR; INTRAVENOUS; SOFT TISSUE at 08:35

## 2018-06-05 RX ADMIN — FENTANYL CITRATE 100 MCG: 50 INJECTION, SOLUTION INTRAMUSCULAR; INTRAVENOUS at 08:35

## 2018-06-05 NOTE — H&P (VIEW-ONLY)
[facility]  [address] [address] [facility] [address]  [phone]  Dept: [phone]    PRE-OP EVALUATION:  Today's date: 2018    Dot Kenny (: 1965) presents for pre-operative evaluation assessment as requested by Dr. Coleman.  She requires evaluation and anesthesia risk assessment prior to undergoing surgery/procedure for treatment of painful hardware, right foot .    Proposed Surgery/ Procedure:        Removal of Hardware Right Foot         Date of Surgery/ Procedure: 2018  Time of Surgery/ Procedure: 8:30 am  Hospital/Surgical Facility: [facility]    Primary Physician: Mayito Dumont  Type of Anesthesia Anticipated: monitor anesthesia care    Patient has a Health Care Directive or Living Will:  NO    1. NO - Do you have a history of heart attack, stroke, stent, bypass or surgery on an artery in the head, neck, heart or legs?  2. NO - Do you ever have any pain or discomfort in your chest?  3. NO - Do you have a history of  Heart Failure?  4. NO - Are you troubled by shortness of breath when: walking on the level, up a slight hill or at night?  5. NO - Do you currently have a cold, bronchitis or other respiratory infection?  6. NO - Do you have a cough, shortness of breath or wheezing?  7. NO - Do you sometimes get pains in the calves of your legs when you walk?  8. NO - Do you or anyone in your family have previous history of blood clots?  9. NO - Do you or does anyone in your family have a serious bleeding problem such as prolonged bleeding following surgeries or cuts?  10. Yes - Have you ever had problems with anemia or been told to take iron pills?  11. NO - Have you had any abnormal blood loss such as black, tarry or bloody stools, or abnormal vaginal bleeding?  12. NO - Have you ever had a blood transfusion?  13. NO - Have you or any of your relatives ever had problems with anesthesia?  14. NO - Do you have sleep apnea, excessive snoring or daytime  drowsiness?  15. NO - Do you have any prosthetic heart valves?  16. NO - Do you have prosthetic joints?  17. NO - Is there any chance that you may be pregnant?      HPI:     HPI related to upcoming procedure: In June 2016 she underwent a successful bunionectomy on her right foot.  She was doing well until the last few months started noticing a prominent bump over the hardware from her surgery.  The screw seems to be working itself out, so Dr. Coleman recommends removal of the hardware      See problem list for active medical problems.  Problems all longstanding and stable, except as noted/documented.  See ROS for pertinent symptoms related to these conditions.                                                                                                                                                          .    MEDICAL HISTORY:     Patient Active Problem List    Diagnosis Date Noted     ASCUS favor benign 03/17/2016     Priority: Medium     3/17/16: Pap - ASCUS, Neg HPV. Plan cotest in 3 years.        Dysmenorrhea 11/24/2015     Priority: Medium     CARDIOVASCULAR SCREENING; LDL GOAL LESS THAN 160 10/31/2010     Priority: Medium      Past Medical History:   Diagnosis Date     ASCUS favor benign 3/2016    Neg HPV     Past Surgical History:   Procedure Laterality Date     BUNIONECTOMY Right 6/14/2016    Procedure: BUNIONECTOMY;  Surgeon: Jean Carlos Coleman DPM;  Location: [facility] APPENDECTOMY  2000    appendectomy      Current Outpatient Prescriptions   Medication Sig Dispense Refill     NO ACTIVE MEDICATIONS .       OTC products: None    Allergies   Allergen Reactions     Cefzil [Cefprozil]      rash     Sulfa Drugs      Rash        Latex Allergy: NO    Social History   Substance Use Topics     Smoking status: Never Smoker     Smokeless tobacco: Never Used     Alcohol use No     History   Drug Use No       REVIEW OF SYSTEMS:   CONSTITUTIONAL: NEGATIVE for fever, chills, change in weight  ENT/MOUTH: NEGATIVE  "for ear, mouth and throat problems  RESP: NEGATIVE for significant cough or SOB  CV: NEGATIVE for chest pain, palpitations or peripheral edema    EXAM:   BP 99/60 (BP Location: Right arm, Patient Position: Chair, Cuff Size: Adult Regular)  Pulse 73  Temp 98  F (36.7  C) (Tympanic)  Resp 16  Ht 5' 4.25\" (1.632 m)  Wt 170 lb 6.4 oz (77.3 kg)  SpO2 94%  BMI 29.02 kg/m2  GENERAL APPEARANCE: healthy, alert and no distress  HENT: ear canals and TM's normal and nose and mouth without ulcers or lesions  RESP: lungs clear to auscultation - no rales, rhonchi or wheezes  CV: regular rate and rhythm, normal S1 S2, no S3 or S4 and no murmur, click or rub   ABDOMEN: soft, nontender, no HSM or masses and bowel sounds normal  NEURO: Normal strength and tone, sensory exam grossly normal, mentation intact and speech normal    DIAGNOSTICS:   No labs or EKG required for low risk surgery (cataract, skin procedure, breast biopsy, etc)    Recent Labs   Lab Test  03/17/16   0914   HGB  12.5   PLT  228   NA  139   POTASSIUM  4.2   CR  0.84        IMPRESSION:   Reason for surgery/procedure: Painful hardware right foot  Diagnosis/reason for consult: Evaluate for anesthesia risk    The proposed surgical procedure is considered LOW risk.    REVISED CARDIAC RISK INDEX  The patient has the following serious cardiovascular risks for perioperative complications such as (MI, PE, VFib and 3  AV Block):  No serious cardiac risks  INTERPRETATION: 0 risks: Class I (very low risk - 0.4% complication rate)    The patient has the following additional risks for perioperative complications:  No identified additional risks      ICD-10-CM    1. Preop general physical exam Z01.818    2. Painful orthopaedic hardware (H) T84.84XA    3. History of bunionectomy of right great toe Z98.890        RECOMMENDATIONS:             APPROVAL GIVEN to proceed with proposed procedure, without further diagnostic evaluation       Signed Electronically by: JONO Harman, " MD    Copy of this evaluation report is provided to requesting physician.    [facility] Preop Guidelines    Revised Cardiac Risk Index

## 2018-06-05 NOTE — BRIEF OP NOTE
SURGEON: RUSSEL Coleman DPM, FACFAS    ASSISTANT: none    PREOP DIAGNOSIS: 1. Painful retained hardware, right foot    POSTOP DIAGNOSIS: same as above    PROCEDURE: 1. Removal of hardware right foot    PATHOLOGY: none    ANESTHESIA: Local MAC     HEMOSTASIS: Pneumatic ankle Tourniquet 250 psi    INJECTABLE: 20 cc Buffered 1% Lidocaine plain; 10 cc 0.5% Marcaine plain    BLOOD LOSS: 10 cc.    CONDITION: Vital signs are stable and vascular status intact the the lower extremities.

## 2018-06-05 NOTE — ANESTHESIA CARE TRANSFER NOTE
Patient: Dot Kenny    Procedure(s):  Removal of Hardware Right Foot - Wound Class: I-Clean    Diagnosis: right foot painful orthopedic hardware  Diagnosis Additional Information: No value filed.    Anesthesia Type:   MAC     Note:  Airway :Face Mask  Patient transferred to:Phase II  Handoff Report: Identifed the Patient, Identified the Reponsible Provider, Reviewed the pertinent medical history, Discussed the surgical course, Reviewed Intra-OP anesthesia mangement and issues during anesthesia, Set expectations for post-procedure period and Allowed opportunity for questions and acknowledgement of understanding      Vitals: (Last set prior to Anesthesia Care Transfer)    CRNA VITALS  6/5/2018 0843 - 6/5/2018 0914      6/5/2018             Pulse: 59    SpO2: 98 %                Electronically Signed By: DENAE Ramos CRNA  June 5, 2018  9:14 AM

## 2018-06-05 NOTE — DISCHARGE INSTRUCTIONS
Same Day Surgery Discharge Instructions  Special Precautions After Surgery - Adult    1. It is not unusual to feel lightheaded or faint, up to 24 hours after surgery or while taking pain medication.  If you have these symptoms; sit for a few minutes before standing and have someone assist you when getting up.  2. You should rest and relax for the next 24 hours and must have someone stay with you for at least 24 hours after your discharge.  3. DO NOT DRIVE any vehicle or operate mechanical equipment for 24 hours following the end of your surgery.  DO NOT DRIVE while taking narcotic pain medications that have been prescribed by your physician.  If you had a limb operated on, you must be able to use it fully to drive.  4. DO NOT drink alcoholic beverages for 24 hours following surgery or while taking prescription pain medication.  5. Drink clear liquids (apple juice, ginger ale, broth, 7-Up, etc.).  Progress to your regular diet as you feel able.  6. Any questions call your physician and do not make important decisions for 24 hours.    ACTIVITY  ? As tolerated     INCISIONAL CARE  ? No weight bearing right foot      an appointment to return to the clinic has been scheduled    Medications:  ? Atarax, Vistaril and Percocet as prescribed on bottle next dose     Additional discharge instructions:   __________________________________________________________________________________________________________________________________  IMPORTANT NUMBERS:    [facility] Main Number:  [phone], [phone]  Pharmacy:  [phone]  Same Day Surgery:  [phone], Monday - Friday until 8:30 p.m.  Urgent Care:  [phone]  Emergency Room:  [phone]      [facility]:  [phone]                                                                             [facility]:  [phone] option 1  [facility]:  [phone]     [facility] Clinic:  [phone]   Surgery Specialty  Clinic:  [phone]   Home Medical Equipment: [phone]  [facility]:  [phone]

## 2018-06-05 NOTE — OP NOTE
PREOPERATIVE DIAGNOSIS: 1.  Retained painful orthopedic hardware, right foot.   POSTOPERATIVE DIAGNOSIS: 1. Retained painful orthopedic hardware, right foot.   PROCEDURE: 1. Removal of hardware, right foot.   PATHOLOGY: None.   ANESTHESIA: Local MAC   ESTIMATED BLOOD LOSS: Less than 10 mL   INDICATIONS FOR PROCEDURE: Dot Kenny is a 53 year old-year-old female with painful retained orthopedic hardware of the right foot. The patient was consented for removal of hardware, right foot.   PROCEDURE IN DETAIL: Under mild sedation, the patient in the operating room and placed on the operating table in the supine position. Pneumatic ankle tourniquet was placed around the patient's right ankle. After adequate sedation, approximately 20 cc of 1% buffered lidocaine was injected around the first ray of the right foot. The foot was then scrubbed, prepped and draped in the usual aseptic manner. Esmarch bandage was utilized to exsanguinate the patient's right lower extremity, and the pneumatic ankle tourniquet was inflated to 250 PSI.   Following this, an operative time out was performed according to our institution's policy which confirmed the laterality of the procedure. Preoperative antibiotics were administered to the patient prior to arrival to the OR.     The procedure began with a linear longitudinal incision over the previous incision on the medial aspect of the arch on the right.  The incision was made full thickness down to subcutaneous tissue with care taken to avoid all vital neurovascular structures.  Any active bleeders were cauterized and ligated as needed.  Further dissection was carried down to the plate where the screws and plate were removed in toto.  The wound was irrigated with copious amounts of normal sterile saline. Tourniquet was deflated and prompt hyperemic response was noted to all five digits of the right foot. The capsule and fascia was reapproximated utilizing 3-0 Vicryl. The skin was reapproximated  utilizing 4-0 nylon suture in a continuous running interlocking fashion. The area was blocked with approximately 10 cc of 0.25% Marcaine plain. The wound was dressed with sterile bacitracin, Xeroform, 4 x 4s, cast padding and an Ace wrap.   The patient tolerated these procedures well and was transferred from the operative table to the transport cart and taken from the OR to the PACU.  In PACU, patient and staff given orders and instructions for post-operative care in the following areas: post op pain management, weight-bearing status, dressing/splint care, weight-bearing assistive devices, elevation of the extremity, ice/cold therapy, DVT/blood clot prevention, nutrition and post-operative concerns to be aware.  Case and post-operative care measures were reviewed with the patient and family members present.  A post operative instruction sheet was provided.  If concerns or questions arise they will contact our clinic.  If acute concerns develop they will present emergently to a nearby medical center.      AMADEO ARMANDO DPM, FACFAS

## 2018-06-05 NOTE — IP AVS SNAPSHOT
[facility] PreOP/Phase II    [address] [address]    Phone:  [phone]    Fax:  [phone]                                       After Visit Summary   6/5/2018    Dot Kenny    MRN: [id number]           After Visit Summary Signature Page     I have received my discharge instructions, and my questions have been answered. I have discussed any challenges I see with this plan with the nurse or doctor.    ..........................................................................................................................................  Patient/Patient Representative Signature      ..........................................................................................................................................  Patient Representative Print Name and Relationship to Patient    ..................................................               ................................................  Date                                            Time    ..........................................................................................................................................  Reviewed by Signature/Title    ...................................................              ..............................................  Date                                                            Time

## 2018-06-05 NOTE — IP AVS SNAPSHOT
MRN:[id number]                      After Visit Summary   6/5/2018    Dot Kenny    MRN: [id number]           Thank you!     Thank you for choosing [facility] for your care. Our goal is always to provide you with excellent care. Hearing back from our patients is one way we can continue to improve our services. Please take a few minutes to complete the written survey that you may receive in the mail after you visit with us. Thank you!        Patient Information     Date Of Birth          1965        About your hospital stay     You were admitted on:  June 5, 2018 You last received care in the:  [facility] PreOP/Phase II    You were discharged on:  June 5, 2018       Who to Call     For medical emergencies, please call 911.  For non-urgent questions about your medical care, please call your primary care provider or clinic, [phone]  For questions related to your surgery, please call your surgery clinic        Attending Provider     Provider Jean Carlos Waldron DPM Podiatry       Primary Care Provider Office Phone # Fax #    Chetanmary Jordyn Dumont [phone]      After Care Instructions      Diet as Tolerated       Return to diet before surgery, unless instructed otherwise.            Discharge Instructions       Review outpatient procedure discharge instructions with patient as directed by Provider:  1.  Keep dressing clean, dry and intact  2.  Keep the affected foot elevated at heart level  3.  Ice the affected foot 20 minutes per hour while awake  4.  Take prescription medication as directed  5.  If pain persists, remove the ACE wrap and rewrap the bandage looser.            Ice to affected area       Ice pack to surgical site every 15 minutes per hour for 24 hours            No Alcohol       For 24 hours post procedure            No Dressing Change       No dressing change until follow up appointment.            No driving or operating machinery         until the day after procedure            No weight bearing           Notify Provider       For signs and symptoms of infection: Fever greater than 101, redness, swelling, heat at site, drainage, pus.            Return to clinic       Return to clinic in one week            Shower        Cover dressing if dressing is not going to be changed today                  Your next 10 appointments already scheduled     Jun 06, 2018  3:20 PM CDT   Pre-Op physical with Mayito Dumont MD   [facility] ([facility]    [address]  [facility] [address]   [phone]            Jun 11, 2018  2:40 PM CDT   Return Visit with Jean Carlos Coleman DPM   [facility] ([facility])    [address]  [address]  [facility] [place] [address]   [phone]              Further instructions from your care team                           Same Day Surgery Discharge Instructions  Special Precautions After Surgery - Adult    1. It is not unusual to feel lightheaded or faint, up to 24 hours after surgery or while taking pain medication.  If you have these symptoms; sit for a few minutes before standing and have someone assist you when getting up.  2. You should rest and relax for the next 24 hours and must have someone stay with you for at least 24 hours after your discharge.  3. DO NOT DRIVE any vehicle or operate mechanical equipment for 24 hours following the end of your surgery.  DO NOT DRIVE while taking narcotic pain medications that have been prescribed by your physician.  If you had a limb operated on, you must be able to use it fully to drive.  4. DO NOT drink alcoholic beverages for 24 hours following surgery or while taking prescription pain medication.  5. Drink clear liquids (apple juice, ginger ale, broth, 7-Up, etc.).  Progress to your regular diet as you feel able.  6. Any questions call your physician and do not make important  "decisions for 24 hours.    ACTIVITY  ? As tolerated     INCISIONAL CARE  ? No weight bearing right foot      an appointment to return to the clinic has been scheduled    Medications:  ? Atarax, Vistaril and Percocet as prescribed on bottle next dose     Additional discharge instructions:   __________________________________________________________________________________________________________________________________  IMPORTANT NUMBERS:    [facility] [place]:  [phone], [phone]  Pharmacy:  [phone]  Same Day Surgery:  [phone], Monday - Friday until 8:30 p.m.  Urgent Care:  [phone]  Emergency Room:  [phone]      [facility]:  [phone]                                                                             [facility]:  [phone] option 1  [facility]:  [phone]     OB Clinic:  [phone]   [facility]:  [phone]   Home Medical Equipment: [phone]  [facility]:  [phone]        Pending Results     No orders found from 6/3/2018 to 6/6/2018.            Admission Information     Date & Time Provider Department Dept. Phone    6/5/2018 Jean Carlos Coleman, ARTURO [facility] PreOP/Phase [phone]      Your Vitals Were     Blood Pressure Temperature Respirations Height Weight Last Period    87/71 98.2  F (36.8  C) (Oral) 16 1.632 m (5' 4.25\") 77.1 kg (170 lb) 02/05/2018    Pulse Oximetry BMI (Body Mass Index)                99% 28.95 kg/m2          Care EveryWhere ID     This is your Care EveryWhere ID. This could be used by other organizations to access your [facility] medical records  [id number]        Equal Access to Services     ABNER PIMENTEL : Juancho Magana, rhianna richards, luis alfredo granados. [facility] [phone].    ATENCIÓN: Si habla español, tiene a cade disposición servicios gratuitos de asistencia lingüística. Llame al " [phone].    We comply with applicable federal civil rights laws and [place] laws. We do not discriminate on the basis of race, color, national origin, age, disability, sex, sexual orientation, or gender identity.               Review of your medicines      START taking        Dose / Directions    hydrOXYzine 25 MG tablet   Commonly known as:  ATARAX   Used for:  Painful orthopaedic hardware (H)        Dose:  25 mg   Take 1 tablet (25 mg) by mouth every 6 hours as needed for itching (and nausea)   Quantity:  30 tablet   Refills:  0       oxyCODONE-acetaminophen 5-325 MG per tablet   Commonly known as:  PERCOCET   Used for:  Painful orthopaedic hardware (H)        Dose:  1-2 tablet   Take 1-2 tablets by mouth every 4 hours as needed for pain (moderate to severe)   Quantity:  60 tablet   Refills:  0       senna-docusate 8.6-50 MG per tablet   Commonly known as:  SENOKOT-S;PERICOLACE   Used for:  Painful orthopaedic hardware (H)        Dose:  1-2 tablet   Take 1-2 tablets by mouth 2 times daily Take while on oral narcotics to prevent or treat constipation.   Quantity:  30 tablet   Refills:  0         CONTINUE these medicines which have NOT CHANGED        Dose / Directions    NO ACTIVE MEDICATIONS        .   Refills:  0            Where to get your medicines      Some of these will need a paper prescription and others can be bought over the counter. Ask your nurse if you have questions.     Bring a paper prescription for each of these medications     hydrOXYzine 25 MG tablet    oxyCODONE-acetaminophen 5-325 MG per tablet    senna-docusate 8.6-50 MG per tablet                Protect others around you: Learn how to safely use, store and throw away your medicines at www.disposemymeds.org.        Information about OPIOIDS     PRESCRIPTION OPIOIDS: WHAT YOU NEED TO KNOW   You have a prescription for an opioid (narcotic) pain medicine. Opioids can cause addiction. If you have a history of chemical dependency of any  type, you are at a higher risk of becoming addicted to opioids. Only take this medicine after all other options have been tried. Take it for as short a time and as few doses as possible.     Do not:    Drive. If you drive while taking these medicines, you could be arrested for driving under the influence (DUI).    Operate heavy machinery    Do any other dangerous activities while taking these medicines.     Drink any alcohol while taking these medicines.      Take with any other medicines that contain acetaminophen. Read all labels carefully. Look for the word  acetaminophen  or  Tylenol.  Ask your pharmacist if you have questions or are unsure.    Store your pills in a secure place, locked if possible. We will not replace any lost or stolen medicine. If you don t finish your medicine, please throw away (dispose) as directed by your pharmacist. The [place] Pollution Control Agency has more information about safe disposal: https://www.pca.[facility].[place].us/living-green/managing-unwanted-medications    All opioids tend to cause constipation. Drink plenty of water and eat foods that have a lot of fiber, such as fruits, vegetables, prune juice, apple juice and high-fiber cereal. Take a laxative (Miralax, milk of magnesia, Colace, Senna) if you don t move your bowels at least every other day.              Medication List: This is a list of all your medications and when to take them. Check marks below indicate your daily home schedule. Keep this list as a reference.      Medications           Morning Afternoon Evening Bedtime As Needed    hydrOXYzine 25 MG tablet   Commonly known as:  ATARAX   Take 1 tablet (25 mg) by mouth every 6 hours as needed for itching (and nausea)   Last time this was given:  25 mg on 6/5/2018 10:01 AM                                NO ACTIVE MEDICATIONS   .                                oxyCODONE-acetaminophen 5-325 MG per tablet   Commonly known as:  PERCOCET   Take 1-2 tablets by mouth every 4  hours as needed for pain (moderate to severe)                                senna-docusate 8.6-50 MG per tablet   Commonly known as:  SENOKOT-S;PERICOLACE   Take 1-2 tablets by mouth 2 times daily Take while on oral narcotics to prevent or treat constipation.

## 2018-06-05 NOTE — ANESTHESIA POSTPROCEDURE EVALUATION
Patient: Dot Kenny    Procedure(s):  Removal of Hardware Right Foot - Wound Class: I-Clean    Diagnosis:right foot painful orthopedic hardware  Diagnosis Additional Information: No value filed.    Anesthesia Type:  MAC    Note:  Anesthesia Post Evaluation    Patient location during evaluation: Phase 2  Patient participation: Able to fully participate in evaluation  Level of consciousness: awake and alert  Pain management: adequate  Airway patency: patent  Cardiovascular status: acceptable and hemodynamically stable  Respiratory status: acceptable and room air  Hydration status: acceptable  PONV: none     Anesthetic complications: None          Last vitals:  Vitals:    06/05/18 0931 06/05/18 0944 06/05/18 0957   BP: (!) 72/46 (!) 89/60 (!) 87/71   Resp: 16 16 16   Temp:      SpO2: 99% 99% 99%         Electronically Signed By: DENAE Ramos CRNA  June 5, 2018  10:17 AM

## 2018-06-06 ENCOUNTER — OFFICE VISIT (OUTPATIENT)
Dept: FAMILY MEDICINE | Facility: CLINIC | Age: 53
End: 2018-06-06
Payer: COMMERCIAL

## 2018-06-06 VITALS
DIASTOLIC BLOOD PRESSURE: 69 MMHG | HEART RATE: 64 BPM | RESPIRATION RATE: 16 BRPM | TEMPERATURE: 97 F | WEIGHT: 180 LBS | BODY MASS INDEX: 30.73 KG/M2 | HEIGHT: 64 IN | SYSTOLIC BLOOD PRESSURE: 106 MMHG

## 2018-06-06 DIAGNOSIS — Z00.00 WELL ADULT EXAM: Primary | ICD-10-CM

## 2018-06-06 DIAGNOSIS — Z12.11 SPECIAL SCREENING FOR MALIGNANT NEOPLASMS, COLON: ICD-10-CM

## 2018-06-06 PROCEDURE — 99396 PREV VISIT EST AGE 40-64: CPT | Performed by: FAMILY MEDICINE

## 2018-06-06 ASSESSMENT — PAIN SCALES - GENERAL: PAINLEVEL: NO PAIN (0)

## 2018-06-06 NOTE — PROGRESS NOTES
SUBJECTIVE:   CC: Dot Kenny is an 53 year old woman who presents for preventive health visit.     Healthy Habits:    Do you get at least three servings of calcium containing foods daily (dairy, green leafy vegetables, etc.)? 2 servings daily    Amount of exercise or daily activities, outside of work: none    Problems taking medications regularly not applicable    Medication side effects: N/A    Have you had an eye exam in the past two years? yes    Do you see a dentist twice per year? yes    Do you have sleep apnea, excessive snoring or daytime drowsiness?no      [facility] 2/2018 - mild hot flushing.    Has had 4 episodes over the last year or so of about 2 hrs LLQ pain - feels sudden stabbing pain. Self limited to <2 hrs. No other associated symptoms.    Today's PHQ-2 Score:   PHQ-2 ( 1999 [employer]) 6/6/2018 6/1/2018   Q1: Little interest or pleasure in doing things 0 0   Q2: Feeling down, depressed or hopeless 0 0   PHQ-2 Score 0 0       Abuse: Current or Past(Physical, Sexual or Emotional)- No  Do you feel safe in your environment - Yes    Social History   Substance Use Topics     Smoking status: Never Smoker     Smokeless tobacco: Never Used     Alcohol use No     If you drink alcohol do you typically have >3 drinks per day or >7 drinks per week? No                     Reviewed orders with patient.  Reviewed health maintenance and updated orders accordingly - Yes  Labs reviewed in EPIC  Patient Active Problem List   Diagnosis     CARDIOVASCULAR SCREENING; LDL GOAL LESS THAN 160     Dysmenorrhea     ASCUS favor benign     Past Surgical History:   Procedure Laterality Date     BUNIONECTOMY Right 6/14/2016    Procedure: BUNIONECTOMY;  Surgeon: Jean Carlos Coleman DPM;  Location: [facility] APPENDECTOMY  2000    appendectomy      REMOVE HARDWARE FOOT Right 6/5/2018    Procedure: REMOVE HARDWARE FOOT;  Removal of Hardware Right Foot;  Surgeon: Jean Carlos Coleman DPM;  Location: [facility] OR       Social History    Substance Use Topics     Smoking status: Never Smoker     Smokeless tobacco: Never Used     Alcohol use No     Family History   Problem Relation Age of Onset     Hypertension Father      Thyroid Disease Paternal Grandfather      DIABETES No family hx of      Breast Cancer No family hx of      Cancer - colorectal No family hx of      Alcohol/Drug No family hx of      Lipids No family hx of          Current Outpatient Prescriptions   Medication Sig Dispense Refill     Acetaminophen (TYLENOL) 325 MG CAPS Take 325-650 mg by mouth every 4 hours as needed       NO ACTIVE MEDICATIONS .       Allergies   Allergen Reactions     Cefzil [Cefprozil]      rash     Sulfa Drugs      Rash         Patient over age 50, mutual decision to screen reflected in health maintenance.    Pertinent mammograms are reviewed under the imaging tab.  History of abnormal Pap smear:   YES - updated in Problem List and Health Maintenance accordingly  Last 3 Pap and HPV Results:   PAP / HPV Latest Ref Rng & Units 3/17/2016 8/1/2013 1/19/2010   PAP - ASC-US(A) OTHER-NIL, See Result NIL   HPV 16 DNA NEG Negative - -   HPV 18 DNA NEG Negative - -   OTHER HR HPV NEG Negative - -       Reviewed and updated as needed this visit by clinical staff  Tobacco  Allergies  Med Hx  Surg Hx  Fam Hx  Soc Hx        Reviewed and updated as needed this visit by Provider        Past Medical History:   Diagnosis Date     ASCUS favor benign 3/2016    Neg HPV      Past Surgical History:   Procedure Laterality Date     BUNIONECTOMY Right 6/14/2016    Procedure: BUNIONECTOMY;  Surgeon: Jean Carlos Coleman DPM;  Location: [facility] OR     C APPENDECTOMY  2000    appendectomy      REMOVE HARDWARE FOOT Right 6/5/2018    Procedure: REMOVE HARDWARE FOOT;  Removal of Hardware Right Foot;  Surgeon: Jean Carlos Coleman DPM;  Location: [facility] OR       ROS:  Constitutional, neuro, ENT, endocrine, pulmonary, cardiac, gastrointestinal, genitourinary, musculoskeletal, integument and  "psychiatric systems are negative, except as otherwise noted.     OBJECTIVE:   /69 (BP Location: Right arm, Cuff Size: Adult Regular)  Pulse 64  Temp 97  F (36.1  C) (Oral)  Resp 16  Ht 5' 4.25\" (1.632 m)  Wt 180 lb (81.6 kg)  Breastfeeding? No  BMI 30.66 kg/m2  EXAM:  GENERAL APPEARANCE: healthy, alert and no distress  EYES: Eyes grossly normal to inspection, PERRL and conjunctivae and sclerae normal  HENT: ear canals and TM's normal, nose and mouth without ulcers or lesions, oropharynx clear and oral mucous membranes moist  NECK: no adenopathy, no asymmetry, masses, or scars and thyroid normal to palpation  RESP: lungs clear to auscultation - no rales, rhonchi or wheezes  BREAST: normal without masses, tenderness or nipple discharge and no palpable axillary masses or adenopathy  CV: regular rate and rhythm, normal S1 S2, no S3 or S4, no murmur, click or rub, no peripheral edema and peripheral pulses strong  ABDOMEN: soft, nontender, no hepatosplenomegaly, no masses and bowel sounds normal  MS: no musculoskeletal defects are noted and gait is age appropriate without ataxia  SKIN: no suspicious lesions or rashes  NEURO: Normal strength and tone, sensory exam grossly normal, mentation intact and speech normal  PSYCH: mentation appears normal and affect normal/bright    ASSESSMENT/PLAN:   1. Well adult exam    2. Special screening for malignant neoplasms, colon  - Fecal colorectal cancer screen (FIT); Future    COUNSELING:   Reviewed preventive health counseling, as reflected in patient instructions         reports that she has never smoked. She has never used smokeless tobacco.    Estimated body mass index is 30.66 kg/(m^2) as calculated from the following:    Height as of this encounter: 5' 4.25\" (1.632 m).    Weight as of this encounter: 180 lb (81.6 kg).   Weight management plan: Discussed healthy diet and exercise guidelines and patient will follow up in 12 months in clinic to " re-evaluate.    Counseling Resources:  ATP IV Guidelines  Pooled Cohorts Equation Calculator  Breast Cancer Risk Calculator  FRAX Risk Assessment  ICSI Preventive Guidelines  Dietary Guidelines for Americans, 2010  USDA's MyPlate  ASA Prophylaxis  Lung CA Screening    Mayito Dumont MD  [facility]

## 2018-06-06 NOTE — MR AVS SNAPSHOT
After Visit Summary   6/6/2018    Dot Kenny    MRN: [id number]           Patient Information     Date Of Birth          1965        Visit Information        Provider Department      6/6/2018 3:20 PM Mayito Dumont MD [facility]        Today's Diagnoses     Well adult exam    -  1    Special screening for malignant neoplasms, colon          Care Instructions      Preventive Health Recommendations  Female Ages 50 - 64    Yearly exam: See your health care provider every year in order to  o Review health changes.   o Discuss preventive care.    o Review your medicines if your doctor has prescribed any.      Get a Pap test every three years (unless you have an abnormal result and your provider advises testing more often).    If you get Pap tests with HPV test, you only need to test every 5 years, unless you have an abnormal result.     You do not need a Pap test if your uterus was removed (hysterectomy) and you have not had cancer.    You should be tested each year for STDs (sexually transmitted diseases) if you're at risk.     Have a mammogram every 1 to 2 years.    Have a colonoscopy at age 50, or have a yearly FIT test (stool test). These exams screen for colon cancer.      Have a cholesterol test every 5 years, or more often if advised.    Have a diabetes test (fasting glucose) every three years. If you are at risk for diabetes, you should have this test more often.     If you are at risk for osteoporosis (brittle bone disease), think about having a bone density scan (DEXA).    Shots: Get a flu shot each year. Get a tetanus shot every 10 years.    Nutrition:     Eat at least 5 servings of fruits and vegetables each day.    Eat whole-grain bread, whole-wheat pasta and brown rice instead of white grains and rice.    Talk to your provider about Calcium and Vitamin D.     Lifestyle    Exercise at least 150 minutes a week (30 minutes a day, 5 days a week). This will help  "you control your weight and prevent disease.    Limit alcohol to one drink per day.    No smoking.     Wear sunscreen to prevent skin cancer.     See your dentist every six months for an exam and cleaning.    See your eye doctor every 1 to 2 years.            Follow-ups after your visit        Your next 10 appointments already scheduled     Jun 21, 2018  8:40 AM CDT   Return Visit with Jean Carlos Coleman DPM   [facility] ([facility])    [address]  [address]  [facility] [place] [address] [phone]              Who to contact     If you have questions or need follow up information about today's clinic visit or your schedule please contact [facility] directly at [phone].  Normal or non-critical lab and imaging results will be communicated to you by MyChart, letter or phone within 4 business days after the clinic has received the results. If you do not hear from us within 7 days, please contact the clinic through MyChart or phone. If you have a critical or abnormal lab result, we will notify you by phone as soon as possible.  Submit refill requests through [facility] or call your pharmacy and they will forward the refill request to us. Please allow 3 business days for your refill to be completed.          Additional Information About Your Visit        Care EveryWhere ID     This is your Care EveryWhere ID. This could be used by other organizations to access your [facility] medical records  [id number]        Your Vitals Were     Pulse Temperature Respirations Height Breastfeeding? BMI (Body Mass Index)    64 97  F (36.1  C) (Oral) 16 5' 4.25\" (1.632 m) No 30.66 kg/m2       Blood Pressure from Last 3 Encounters:   06/11/18 107/80   06/07/18 119/76   06/06/18 106/69    Weight from Last 3 Encounters:   06/11/18 170 lb (77.1 kg)   06/07/18 170 lb (77.1 kg)   06/06/18 180 lb (81.6 kg)                 Today's Medication Changes          These changes " are accurate as of 6/6/18 11:59 PM.  If you have any questions, ask your nurse or doctor.               Stop taking these medicines if you haven't already. Please contact your care team if you have questions.     hydrOXYzine 25 MG tablet   Commonly known as:  ATARAX   Stopped by:  Mayito Dumont MD           oxyCODONE-acetaminophen 5-325 MG per tablet   Commonly known as:  PERCOCET   Stopped by:  Mayito Dumont MD           senna-docusate 8.6-50 MG per tablet   Commonly known as:  SENOKOT-S;PERICOLACE   Stopped by:  Mayito Dumont MD                    Primary Care Provider Office Phone # Fax #    Mayito Dumont [phone] [address] [address]        Equal Access to Services     [facility]: Hadii aad ku hadasho Soomaali, waaxda luqadaha, qaybta kaalmada adeegyada, luis alfredo ribeiroin haynorm saleh . So [facility] [phone].    ATENCIÓN: Si habla español, tiene a cade disposición servicios gratuitos de asistencia lingüística. Llame al [phone].    We comply with applicable federal civil rights laws and [place] laws. We do not discriminate on the basis of race, color, national origin, age, disability, sex, sexual orientation, or gender identity.            Thank you!     Thank you for choosing [facility]  for your care. Our goal is always to provide you with excellent care. Hearing back from our patients is one way we can continue to improve our services. Please take a few minutes to complete the written survey that you may receive in the mail after your visit with us. Thank you!             Your Updated Medication List - Protect others around you: Learn how to safely use, store and throw away your medicines at www.disposemymeds.org.          This list is accurate as of 6/6/18 11:59 PM.  Always use your most recent med list.                   Brand Name Dispense Instructions for use Diagnosis    NO  ACTIVE MEDICATIONS      .        TYLENOL 325 MG Caps   Generic drug:  Acetaminophen      Take 325-650 mg by mouth every 4 hours as needed

## 2018-06-07 ENCOUNTER — TELEPHONE (OUTPATIENT)
Dept: PODIATRY | Facility: CLINIC | Age: 53
End: 2018-06-07

## 2018-06-07 ENCOUNTER — APPOINTMENT (OUTPATIENT)
Dept: ULTRASOUND IMAGING | Facility: CLINIC | Age: 53
End: 2018-06-07
Attending: PHYSICIAN ASSISTANT
Payer: COMMERCIAL

## 2018-06-07 ENCOUNTER — HOSPITAL ENCOUNTER (EMERGENCY)
Facility: CLINIC | Age: 53
Discharge: HOME | End: 2018-06-07
Attending: PHYSICIAN ASSISTANT | Admitting: PHYSICIAN ASSISTANT
Payer: COMMERCIAL

## 2018-06-07 VITALS
BODY MASS INDEX: 28.95 KG/M2 | WEIGHT: 170 LBS | RESPIRATION RATE: 16 BRPM | TEMPERATURE: 97 F | OXYGEN SATURATION: 99 % | DIASTOLIC BLOOD PRESSURE: 76 MMHG | SYSTOLIC BLOOD PRESSURE: 119 MMHG

## 2018-06-07 DIAGNOSIS — R79.89 ELEVATED TSH: ICD-10-CM

## 2018-06-07 DIAGNOSIS — M79.89 LEG SWELLING: ICD-10-CM

## 2018-06-07 LAB
ALBUMIN SERPL-MCNC: 3.4 G/DL (ref 3.4–5)
ALP SERPL-CCNC: 49 U/L (ref 40–150)
ALT SERPL W P-5'-P-CCNC: 60 U/L (ref 0–50)
ANION GAP SERPL CALCULATED.3IONS-SCNC: 6 MMOL/L (ref 3–14)
AST SERPL W P-5'-P-CCNC: 25 U/L (ref 0–45)
BASOPHILS # BLD AUTO: 0 10E9/L (ref 0–0.2)
BASOPHILS NFR BLD AUTO: 0.4 %
BILIRUB SERPL-MCNC: 0.2 MG/DL (ref 0.2–1.3)
BUN SERPL-MCNC: 15 MG/DL (ref 7–30)
CALCIUM SERPL-MCNC: 8.5 MG/DL (ref 8.5–10.1)
CHLORIDE SERPL-SCNC: 109 MMOL/L (ref 94–109)
CO2 SERPL-SCNC: 27 MMOL/L (ref 20–32)
CREAT SERPL-MCNC: 0.88 MG/DL (ref 0.52–1.04)
D DIMER PPP FEU-MCNC: 0.4 UG/ML FEU (ref 0–0.5)
DIFFERENTIAL METHOD BLD: NORMAL
EOSINOPHIL # BLD AUTO: 0.1 10E9/L (ref 0–0.7)
EOSINOPHIL NFR BLD AUTO: 1.5 %
ERYTHROCYTE [DISTWIDTH] IN BLOOD BY AUTOMATED COUNT: 13 % (ref 10–15)
GFR SERPL CREATININE-BSD FRML MDRD: 68 ML/MIN/1.7M2
GLUCOSE SERPL-MCNC: 98 MG/DL (ref 70–99)
HCT VFR BLD AUTO: 36.4 % (ref 35–47)
HGB BLD-MCNC: 11.9 G/DL (ref 11.7–15.7)
IMM GRANULOCYTES # BLD: 0 10E9/L (ref 0–0.4)
IMM GRANULOCYTES NFR BLD: 0.3 %
LYMPHOCYTES # BLD AUTO: 2.1 10E9/L (ref 0.8–5.3)
LYMPHOCYTES NFR BLD AUTO: 28.9 %
MCH RBC QN AUTO: 31 PG (ref 26.5–33)
MCHC RBC AUTO-ENTMCNC: 32.7 G/DL (ref 31.5–36.5)
MCV RBC AUTO: 95 FL (ref 78–100)
MONOCYTES # BLD AUTO: 0.5 10E9/L (ref 0–1.3)
MONOCYTES NFR BLD AUTO: 6.7 %
NEUTROPHILS # BLD AUTO: 4.5 10E9/L (ref 1.6–8.3)
NEUTROPHILS NFR BLD AUTO: 62.2 %
NRBC # BLD AUTO: 0 10*3/UL
NRBC BLD AUTO-RTO: 0 /100
NT-PROBNP SERPL-MCNC: 444 PG/ML (ref 0–900)
PLATELET # BLD AUTO: 231 10E9/L (ref 150–450)
POTASSIUM SERPL-SCNC: 4.2 MMOL/L (ref 3.4–5.3)
PROT SERPL-MCNC: 6.8 G/DL (ref 6.8–8.8)
RBC # BLD AUTO: 3.84 10E12/L (ref 3.8–5.2)
SODIUM SERPL-SCNC: 142 MMOL/L (ref 133–144)
T4 FREE SERPL-MCNC: 0.92 NG/DL (ref 0.76–1.46)
TSH SERPL DL<=0.005 MIU/L-ACNC: 4.67 MU/L (ref 0.4–4)
WBC # BLD AUTO: 7.3 10E9/L (ref 4–11)

## 2018-06-07 PROCEDURE — 84439 ASSAY OF FREE THYROXINE: CPT | Performed by: PHYSICIAN ASSISTANT

## 2018-06-07 PROCEDURE — 84443 ASSAY THYROID STIM HORMONE: CPT | Performed by: PHYSICIAN ASSISTANT

## 2018-06-07 PROCEDURE — 99284 EMERGENCY DEPT VISIT MOD MDM: CPT | Mod: 25 | Performed by: PHYSICIAN ASSISTANT

## 2018-06-07 PROCEDURE — 85025 COMPLETE CBC W/AUTO DIFF WBC: CPT | Performed by: PHYSICIAN ASSISTANT

## 2018-06-07 PROCEDURE — 99284 EMERGENCY DEPT VISIT MOD MDM: CPT | Mod: Z6 | Performed by: PHYSICIAN ASSISTANT

## 2018-06-07 PROCEDURE — 85379 FIBRIN DEGRADATION QUANT: CPT | Performed by: PHYSICIAN ASSISTANT

## 2018-06-07 PROCEDURE — 80053 COMPREHEN METABOLIC PANEL: CPT | Performed by: PHYSICIAN ASSISTANT

## 2018-06-07 PROCEDURE — 93971 EXTREMITY STUDY: CPT | Mod: LT

## 2018-06-07 PROCEDURE — 83880 ASSAY OF NATRIURETIC PEPTIDE: CPT | Performed by: PHYSICIAN ASSISTANT

## 2018-06-07 PROCEDURE — 99283 EMERGENCY DEPT VISIT LOW MDM: CPT

## 2018-06-07 NOTE — ED AVS SNAPSHOT
[facility] Emergency Department    [address] [address]    Phone:  [phone]    Fax:  [phone]                                       Dot Kenny   MRN: [id number]    Department:  [facility] Emergency Department   Date of Visit:  6/7/2018           Patient Information     Date Of Birth          1965        Your diagnoses for this visit were:     Elevated TSH     Leg swelling        You were seen by Rosibel Escobar PA-C.      Follow-up Information     Follow up with Mayito Dumont MD In 1 week.    Specialty:  Family Practice    Why:  As needed, If symptoms worsen    Contact information:    [address]  [facility] [address]  [phone]        Discharge References/Attachments     LEG SWELLING IN BOTH LEGS (ENGLISH)      Your next 10 appointments already scheduled     Jun 11, 2018  2:40 PM CDT   Return Visit with Jean Carlos Coleman DPM   [facility] ([facility])    [address]  [address]  [facility] [address] [phone]              24 Hour Appointment Hotline       To make an appointment at any [facility], call [phone] ([phone]). If you don't have a family doctor or clinic, we will help you find one. [facility] are conveniently located to serve the needs of you and your family.             Review of your medicines      Our records show that you are taking the medicines listed below. If these are incorrect, please call your family doctor or clinic.        Dose / Directions Last dose taken    NO ACTIVE MEDICATIONS        .   Refills:  0        TYLENOL 325 MG Caps   Dose:  325-650 mg   Generic drug:  Acetaminophen        Take 325-650 mg by mouth every 4 hours as needed   Refills:  0                Procedures and tests performed during your visit     CBC with platelets, differential    Comprehensive metabolic panel    D dimer quantitative    NT pro BNP    T4 free    TSH with  free T4 reflex    US Lower Extremity Venous Duplex Left      Orders Needing Specimen Collection     None      Pending Results     No orders found from 6/5/2018 to 6/8/2018.            Pending Culture Results     No orders found from 6/5/2018 to 6/8/2018.            Pending Results Instructions     If you had any lab results that were not finalized at the time of your Discharge, you can call the ED Lab Result RN at [phone]. You will be contacted by this team for any positive Lab results or changes in treatment. The nurses are available 7 days a week from 10A to 6:30P.  You can leave a message 24 hours per day and they will return your call.        Test Results From Your Hospital Stay        6/7/2018  6:11 PM      Narrative     VENOUS ULTRASOUND LEFT LEG  6/7/2018 5:23 PM     HISTORY: swelling, rule out DVT;     COMPARISON: None.    FINDINGS:  Examination of the deep veins with graded compression and  color flow Doppler with spectral wave form analysis shows no evidence  of thrombus in the common femoral vein, femoral vein, popliteal vein  or calf veins.  There is no venous insufficiency.        Impression     IMPRESSION: No evidence of deep venous thrombosis.    REX HAYS MD         6/7/2018  6:01 PM      Component Results     Component Value Ref Range & Units Status    WBC 7.3 4.0 - 11.0 10e9/L Final    RBC Count 3.84 3.8 - 5.2 10e12/L Final    Hemoglobin 11.9 11.7 - 15.7 g/dL Final    Hematocrit 36.4 35.0 - 47.0 % Final    MCV 95 78 - 100 fl Final    MCH 31.0 26.5 - 33.0 pg Final    MCHC 32.7 31.5 - 36.5 g/dL Final    RDW 13.0 10.0 - 15.0 % Final    Platelet Count 231 150 - 450 10e9/L Final    Diff Method Automated Method  Final    % Neutrophils 62.2 % Final    % Lymphocytes 28.9 % Final    % Monocytes 6.7 % Final    % Eosinophils 1.5 % Final    % Basophils 0.4 % Final    % Immature Granulocytes 0.3 % Final    Nucleated RBCs 0 0 /100 Final    Absolute Neutrophil 4.5 1.6 - 8.3 10e9/L Final    Absolute  Lymphocytes 2.1 0.8 - 5.3 10e9/L Final    Absolute Monocytes 0.5 0.0 - 1.3 10e9/L Final    Absolute Eosinophils 0.1 0.0 - 0.7 10e9/L Final    Absolute Basophils 0.0 0.0 - 0.2 10e9/L Final    Abs Immature Granulocytes 0.0 0 - 0.4 10e9/L Final    Absolute Nucleated RBC 0.0  Final         6/7/2018  6:21 PM      Component Results     Component Value Ref Range & Units Status    Sodium 142 133 - 144 mmol/L Final    Potassium 4.2 3.4 - 5.3 mmol/L Final    Chloride 109 94 - 109 mmol/L Final    Carbon Dioxide 27 20 - 32 mmol/L Final    Anion Gap 6 3 - 14 mmol/L Final    Glucose 98 70 - 99 mg/dL Final    Urea Nitrogen 15 7 - 30 mg/dL Final    Creatinine 0.88 0.52 - 1.04 mg/dL Final    GFR Estimate 68 >60 mL/min/1.7m2 Final    Non  GFR Calc    GFR Estimate If Black 82 >60 mL/min/1.7m2 Final    African American GFR Calc    Calcium 8.5 8.5 - 10.1 mg/dL Final    Bilirubin Total 0.2 0.2 - 1.3 mg/dL Final    Albumin 3.4 3.4 - 5.0 g/dL Final    Protein Total 6.8 6.8 - 8.8 g/dL Final    Alkaline Phosphatase 49 40 - 150 U/L Final    ALT 60 (H) 0 - 50 U/L Final    AST 25 0 - 45 U/L Final         6/7/2018  6:26 PM      Component Results     Component Value Ref Range & Units Status    TSH 4.67 (H) 0.40 - 4.00 mU/L Final         6/7/2018  6:21 PM      Component Results     Component Value Ref Range & Units Status    N-Terminal Pro BNP Inpatient 444 0 - 900 pg/mL Final       Reference range shown and results flagged as abnormal are suggested inpatient   cut points for confirming diagnosis if CHF in an acute setting. Establishing a   baseline value for each individual patient is useful for follow-up. An   inpatient or emergency department NT-proPBNP <300 pg/mL effectively rules out   acute CHF, with 99% negative predictive value.  The outpatient non-acute reference range for ruling out CHF is:   0-125 pg/mL (age 18 to less than 75)   0-450 pg/mL (age 75 yrs and older)           6/7/2018  6:12 PM      Component Results      Component Value Ref Range & Units Status    D Dimer 0.4 0.0 - 0.50 ug/ml FEU Final    This D-dimer assay is intended for use in conjunction with a clinical pretest   probability assessment model to exclude pulmonary embolism (PE) and deep   venous thrombosis (DVT) in outpatients suspected of PE or DVT. The cut-off   value is 0.5 ug/mL FEU.           6/7/2018  6:39 PM      Component Results     Component Value Ref Range & Units Status    T4 Free 0.92 0.76 - 1.46 ng/dL Final                Thank you for choosing [facility]       Thank you for choosing [facility] for your care. Our goal is always to provide you with excellent care. Hearing back from our patients is one way we can continue to improve our services. Please take a few minutes to complete the written survey that you may receive in the mail after you visit with us. Thank you!        Care EveryWhere ID     This is your Care EveryWhere ID. This could be used by other organizations to access your [facility] medical records  [id number]        Equal Access to Services     ABNER PIMENTEL AH: Hadii fannie rosarioo Sonirmal, waaxda luceline, qaybta kaalmatonny odonnell, luis alfredo saleh . So [facility] [phone].    ATENCIÓN: Si habla español, tiene a cade disposición servicios gratuitos de asistencia lingüística. Llame al [phone].    We comply with applicable federal civil rights laws and [place] laws. We do not discriminate on the basis of race, color, national origin, age, disability, sex, sexual orientation, or gender identity.            After Visit Summary       This is your record. Keep this with you and show to your community pharmacist(s) and doctor(s) at your next visit.

## 2018-06-07 NOTE — TELEPHONE ENCOUNTER
Pt calling again to check on status of request - Transferred her to [facility] to speak with a nurse.     Denise Behrendt  Specialty CSS

## 2018-06-07 NOTE — ED PROVIDER NOTES
"  History     Chief Complaint   Patient presents with     Leg Pain     R/O DVT. S/p hardware repair in right foot 6/5/18. Now with swelling in leg, and discomfort in upper thigh.      HPI  Dot Kenny is a 53 year old female who presents to the emergency department with concern over left lower leg swelling which she noted earlier today.  She did have hardware removal from her right foot on 6/5/18.  She states that since then she has had decreased activity .  She also notes that she has been in workshops at work where she sits for 8 hours a day.  She has had some swelling in her lower extremities previously however not this persistent.  Patient complains of pain in her left leg, primarily discomfort in the posterior upper left thigh while she was sitting on the edge of a chair.  She states that she feels \"bloated all over\" and has had some shortness of breath associated with it feeling like she cant take a full breath. She has not had any fever, chills, myalgias, nasal congestion, cough, wheezing, chest pain, palpations, nausea, vomiting, diarrhea, abdominal pain  She denies any overlying erythema, worrisome rashes from surgical incision site, no discharge from the wound.      Problem List:    Patient Active Problem List    Diagnosis Date Noted     ASCUS favor benign 03/17/2016     Priority: Medium     3/17/16: Pap - ASCUS, Neg HPV. Plan cotest in 3 years.        Dysmenorrhea 11/24/2015     Priority: Medium     CARDIOVASCULAR SCREENING; LDL GOAL LESS THAN 160 10/31/2010     Priority: Medium        Past Medical History:    Past Medical History:   Diagnosis Date     ASCUS favor benign 3/2016       Past Surgical History:    Past Surgical History:   Procedure Laterality Date     BUNIONECTOMY Right 6/14/2016    Procedure: BUNIONECTOMY;  Surgeon: Jean Carlos Coleman DPM;  Location: [facility] OR     C APPENDECTOMY  2000    appendectomy      REMOVE HARDWARE FOOT Right 6/5/2018    Procedure: REMOVE HARDWARE FOOT;  Removal of " Hardware Right Foot;  Surgeon: Jean Carlos Coleman DPM;  Location: [facility] OR     Family History:    Family History   Problem Relation Age of Onset     Hypertension Father      Thyroid Disease Paternal Grandfather      DIABETES No family hx of      Breast Cancer No family hx of      Cancer - colorectal No family hx of      Alcohol/Drug No family hx of      Lipids No family hx of        Social History:  Marital Status:   [2]  Social History   Substance Use Topics     Smoking status: Never Smoker     Smokeless tobacco: Never Used     Alcohol use No      Medications:      Acetaminophen (TYLENOL) 325 MG CAPS   NO ACTIVE MEDICATIONS     Review of Systems  CONSTITUTIONAL:NEGATIVE for fever, chills, change in weight  INTEGUMENTARY/SKIN: NEGATIVE for worrisome rashes, moles or lesions  EYES: NEGATIVE for vision changes or irritation  ENT/MOUTH: NEGATIVE for ear, mouth and throat problems  RESP:POSITIVE for resolved shortness of breath and NEGATIVE for cough, wheezing   GI: POSITIVE for sensation of bloating and NEGATIVE for nausea, vomiting, diarrhea, changes in appetite or abdominal pain   MUSCULOSKELETAL: POSITIVE  for left leg pain, swelling and NEGATIVE for other concerning arthralgias or myalgias  NEURO: NEGATIVE for new onset headache, dizziness, lightheadedness, numbness or paresthesias   Physical Exam   BP: 121/80  Heart Rate: 72  Temp: 97  F (36.1  C)  Resp: 16  Weight: 77.1 kg (170 lb)  SpO2: 100 %    Physical Exam   Constitutional: She is oriented to person, place, and time. She appears well-developed and well-nourished. No distress.   HENT:   Head: Normocephalic and atraumatic.   Mouth/Throat: Oropharynx is clear and moist. No oropharyngeal exudate.   Eyes: Conjunctivae and EOM are normal. Pupils are equal, round, and reactive to light. Right eye exhibits no discharge. Left eye exhibits no discharge.   Neck: Normal range of motion. Neck supple.   Cardiovascular: Normal rate, regular rhythm and normal heart  sounds.  Exam reveals no gallop and no friction rub.    No murmur heard.  Pulmonary/Chest: Effort normal and breath sounds normal. No respiratory distress. She has no wheezes. She has no rhonchi. She has no rales.   Abdominal: Soft. Bowel sounds are normal. She exhibits no distension and no mass. There is no tenderness. There is no rebound and no guarding.   Musculoskeletal:        Left ankle: She exhibits swelling. She exhibits normal range of motion, no ecchymosis, no deformity, no laceration and normal pulse. No tenderness. Achilles tendon normal.        Left upper leg: She exhibits no tenderness, no bony tenderness, no swelling, no edema, no deformity and no laceration.        Right lower leg: She exhibits edema (minimal). She exhibits no tenderness, no bony tenderness, no swelling, no deformity and no laceration.        Left lower leg: She exhibits tenderness and edema (minimal). She exhibits no bony tenderness, no deformity and no laceration.   Neurological: She is alert and oriented to person, place, and time. She has normal reflexes. No sensory deficit.   Skin: Skin is warm and dry. No abrasion, no ecchymosis, no laceration and no rash noted. No erythema.   Right foot was bandaged and in post-op shoe, overlying dressing was not removed      ED Course     ED Course     Procedures        Critical Care time:  none          Results for orders placed or performed during the hospital encounter of 06/07/18   US Lower Extremity Venous Duplex Left    Narrative    VENOUS ULTRASOUND LEFT LEG  6/7/2018 5:23 PM     HISTORY: swelling, rule out DVT;     COMPARISON: None.    FINDINGS:  Examination of the deep veins with graded compression and  color flow Doppler with spectral wave form analysis shows no evidence  of thrombus in the common femoral vein, femoral vein, popliteal vein  or calf veins.  There is no venous insufficiency.      Impression    IMPRESSION: No evidence of deep venous thrombosis.    REX HAYS MD   CBC  with platelets, differential   Result Value Ref Range    WBC 7.3 4.0 - 11.0 10e9/L    RBC Count 3.84 3.8 - 5.2 10e12/L    Hemoglobin 11.9 11.7 - 15.7 g/dL    Hematocrit 36.4 35.0 - 47.0 %    MCV 95 78 - 100 fl    MCH 31.0 26.5 - 33.0 pg    MCHC 32.7 31.5 - 36.5 g/dL    RDW 13.0 10.0 - 15.0 %    Platelet Count 231 150 - 450 10e9/L    Diff Method Automated Method     % Neutrophils 62.2 %    % Lymphocytes 28.9 %    % Monocytes 6.7 %    % Eosinophils 1.5 %    % Basophils 0.4 %    % Immature Granulocytes 0.3 %    Nucleated RBCs 0 0 /100    Absolute Neutrophil 4.5 1.6 - 8.3 10e9/L    Absolute Lymphocytes 2.1 0.8 - 5.3 10e9/L    Absolute Monocytes 0.5 0.0 - 1.3 10e9/L    Absolute Eosinophils 0.1 0.0 - 0.7 10e9/L    Absolute Basophils 0.0 0.0 - 0.2 10e9/L    Abs Immature Granulocytes 0.0 0 - 0.4 10e9/L    Absolute Nucleated RBC 0.0    Comprehensive metabolic panel   Result Value Ref Range    Sodium 142 133 - 144 mmol/L    Potassium 4.2 3.4 - 5.3 mmol/L    Chloride 109 94 - 109 mmol/L    Carbon Dioxide 27 20 - 32 mmol/L    Anion Gap 6 3 - 14 mmol/L    Glucose 98 70 - 99 mg/dL    Urea Nitrogen 15 7 - 30 mg/dL    Creatinine 0.88 0.52 - 1.04 mg/dL    GFR Estimate 68 >60 mL/min/1.7m2    GFR Estimate If Black 82 >60 mL/min/1.7m2    Calcium 8.5 8.5 - 10.1 mg/dL    Bilirubin Total 0.2 0.2 - 1.3 mg/dL    Albumin 3.4 3.4 - 5.0 g/dL    Protein Total 6.8 6.8 - 8.8 g/dL    Alkaline Phosphatase 49 40 - 150 U/L    ALT 60 (H) 0 - 50 U/L    AST 25 0 - 45 U/L   TSH with free T4 reflex   Result Value Ref Range    TSH 4.67 (H) 0.40 - 4.00 mU/L   NT pro BNP   Result Value Ref Range    N-Terminal Pro BNP Inpatient 444 0 - 900 pg/mL   D dimer quantitative   Result Value Ref Range    D Dimer 0.4 0.0 - 0.50 ug/ml FEU   T4 free   Result Value Ref Range    T4 Free 0.92 0.76 - 1.46 ng/dL     Medications - No data to display  Assessments & Plan (with Medical Decision Making)     I have reviewed the nursing notes.    I have reviewed the findings,  diagnosis, plan and need for follow up with the patient.     Discharge Medication List as of 6/7/2018  6:41 PM        Final diagnoses:   Elevated TSH   Leg swelling     53-year-old female presents to the emergency department with concern for possible DVT after developing left lower extremity pain, swelling after having surgical hardware removed from right foot 1 week ago.  Patient had stable vital signs upon arrival.  Physical exam findings as described above were significant for mild lower extremity edema bilaterally, however only tender in the shin/calf on the left side.  As part of evaluation patient did have ultrasound of her left lower leg which was her affected side which was negative for acute DVT.  However as patient is more likely to develop problem with her right side given that was the site of surgical hardware removal I did also obtain a d-dimer which was negative/WNL effectively ruling out DVT/PE.  Given that patient has edema of both lower extremities, I did discuss risk/beneifits of obtaining additionally laboratory evaluation including CBC, CMP, which were within normal limits, BNP was 444, however in absence of known CHF diagnosis significance is unclear.  Her TSH was noted to be mildly elevated at 4.67 however free T4 was within normal limits.  Suspect that symptoms are likely secondary to decreased activity, prolonged sitting from recent changes in work and surgical hardware removal resulting in lower extremity edema.  She was reassured of findings.  She was instructed to elevate her lower extremities, consider decreasing salt intake.  Follow-up with primary care provider if no improvement within the next 5-7 days.  Worrisome reasons to return to the ER sooner discussed.      Disclaimer: This note consists of symbols derived from keyboarding, dictation, and/or voice recognition software. As a result, there may be errors in the script that have gone undetected.  Please consider this when  interpreting information found in the chart.    6/7/2018   [facility] EMERGENCY DEPARTMENT     Rosibel Escobar PA-C  06/15/18 1052

## 2018-06-07 NOTE — TELEPHONE ENCOUNTER
Reason for Call:  Other     Detailed comments: Pt had surgery 06/05 for hardware removal from a rt foot bunionectomy 1 year ago. Today she is noticing her left leg is swollen (feels tight). Also feeling bloated in her stomach and chest (hard time getting a deep breath).     Phone Number Patient can be reached at: Cell number on file:    Telephone Information:   Mobile [phone]       Best Time: Any    Can we leave a detailed message on this number? YES    Call taken on 6/7/2018 at 3:12 PM by Denise Behrendt

## 2018-06-07 NOTE — ED AVS SNAPSHOT
[facility] Emergency Department    [address] [address]    Phone:  [phone]    Fax:  [phone]                                       Dot Kenny   MRN: [id number]    Department:  [facility] Emergency Department   Date of Visit:  6/7/2018           After Visit Summary Signature Page     I have received my discharge instructions, and my questions have been answered. I have discussed any challenges I see with this plan with the nurse or doctor.    ..........................................................................................................................................  Patient/Patient Representative Signature      ..........................................................................................................................................  Patient Representative Print Name and Relationship to Patient    ..................................................               ................................................  Date                                            Time    ..........................................................................................................................................  Reviewed by Signature/Title    ...................................................              ..............................................  Date                                                            Time

## 2018-06-07 NOTE — TELEPHONE ENCOUNTER
I spoke with the patient with concerns of left leg swelling and difficulty breathing.  I recommended that she immediately go to the emergency department for further evaluation of possible blood clot formation.  She agreed and will be on her way.

## 2018-06-11 ENCOUNTER — OFFICE VISIT (OUTPATIENT)
Dept: PODIATRY | Facility: CLINIC | Age: 53
End: 2018-06-11
Payer: COMMERCIAL

## 2018-06-11 VITALS
HEART RATE: 68 BPM | WEIGHT: 170 LBS | DIASTOLIC BLOOD PRESSURE: 80 MMHG | SYSTOLIC BLOOD PRESSURE: 107 MMHG | BODY MASS INDEX: 29.02 KG/M2 | HEIGHT: 64 IN

## 2018-06-11 DIAGNOSIS — T84.84XA PAINFUL ORTHOPAEDIC HARDWARE (H): ICD-10-CM

## 2018-06-11 DIAGNOSIS — Z98.890 S/P FOOT SURGERY, RIGHT: Primary | ICD-10-CM

## 2018-06-11 PROCEDURE — 99024 POSTOP FOLLOW-UP VISIT: CPT | Performed by: PODIATRIST

## 2018-06-11 NOTE — LETTER
"    6/11/2018         RE: Dot Kenny  [address] [address]        Dear Colleague,    Thank you for referring your patient, Dot Kenny, to the [facility]. Please see a copy of my visit note below.    Dot presents to the office s/p one week removal of hardware of the right foot .  The patient relates keeping the bandages clean, dry and intact.  The patient relates good compliance with postoperative instructions.  The patient denies any severe pain, fevers, chills, nausea or vomiting.      /80 (BP Location: Left arm, Patient Position: Sitting, Cuff Size: Adult Regular)  Pulse 68  Ht 5' 4.25\" (1.632 m)  Wt 170 lb (77.1 kg)  BMI 28.95 kg/m2  Weight management plan: Patient was referred to their PCP to discuss a diet and exercise plan.    Physical Exam:    General: The patient appears to be in no distress and in good spirits.  The bandages appear clean, dry and intact with no strikethrough noted. Vascular exam: Neurovascular status unchanged with < 3 sec capillary refill to all digits.  Positive pedal pulses and epicritic sensations intact with no evidence of allodynia.  One notes moderate edema to the forefoot on the right.  Sutures are intact and the skin is well coapted with no erythema noted.           Assessment: Painful retained hardware status post one week removal of hardware of the right foot.    Plan:  Sutures remain intact.  A sterile dressing was reapplied.  The patient was instructed to continue non-weightbearing to the right foot.  The patient is to keep the dressings clean, dry and intact and to continue with elevation of the right foot.  The patient will return to the office in one week for suture removal.    Disclaimer: This note consists of symbols derived from keyboarding, dictation and/or voice recognition software. As a result, there may be errors in the script that have gone undetected. Please consider this when interpreting " information found in this chart.       RUSSEL Coleman D.P.M., WISAM.F.A.S.      Again, thank you for allowing me to participate in the care of your patient.        Sincerely,        Jean Carlos Coleman DPM

## 2018-06-11 NOTE — MR AVS SNAPSHOT
"              After Visit Summary   6/11/2018    Dot Kenny    MRN: [id number]           Patient Information     Date Of Birth          1965        Visit Information        Provider Department      6/11/2018 2:40 PM Jean Carlos Coleman DPM [facility]        Today's Diagnoses     S/P foot surgery, right    -  1    Painful orthopaedic hardware (H)           Follow-ups after your visit        Follow-up notes from your care team     Return in about 7 days (around 6/18/2018).      Your next 10 appointments already scheduled     Jun 21, 2018  8:40 AM CDT   Return Visit with Jean Carlos Coleman DPM   [facility] ([facility])    [address]  [address] [place] [address] [phone]              Who to contact     If you have questions or need follow up information about today's clinic visit or your schedule please contact [facility] directly at [phone].  Normal or non-critical lab and imaging results will be communicated to you by MyChart, letter or phone within 4 business days after the clinic has received the results. If you do not hear from us within 7 days, please contact the clinic through [employer] or phone. If you have a critical or abnormal lab result, we will notify you by phone as soon as possible.  Submit refill requests through [employer] or call your pharmacy and they will forward the refill request to us. Please allow 3 business days for your refill to be completed.          Additional Information About Your Visit        Care EveryWhere ID     This is your Care EveryWhere ID. This could be used by other organizations to access your [facility] medical records  [id number]        Your Vitals Were     Pulse Height BMI (Body Mass Index)             68 5' 4.25\" (1.632 m) 28.95 kg/m2          Blood Pressure from Last 3 Encounters:   06/11/18 107/80   06/07/18 119/76   06/06/18 106/69 "    Weight from Last 3 Encounters:   06/11/18 170 lb (77.1 kg)   06/07/18 170 lb (77.1 kg)   06/06/18 180 lb (81.6 kg)              Today, you had the following     No orders found for display       Primary Care Provider Office Phone # Fax #    Mayito Jordyn Dumont [phone]       [address] [facility] [address]        Equal Access to Services     ABNER PIMENTEL : Hadii aad ku hadasho Soomaali, waaxda luqadaha, qaybta kaalmada adeegyada, waxay idiin hayaan adeeg kharash la'andrezn . So [facility] [phone].    ATENCIÓN: Si habla español, tiene a cade disposición servicios gratuitos de asistencia lingüística. [facility] [phone].    We comply with applicable federal civil rights laws and [place] laws. We do not discriminate on the basis of race, color, national origin, age, disability, sex, sexual orientation, or gender identity.            Thank you!     Thank you for choosing [facility]  for your care. Our goal is always to provide you with excellent care. Hearing back from our patients is one way we can continue to improve our services. Please take a few minutes to complete the written survey that you may receive in the mail after your visit with us. Thank you!             Your Updated Medication List - Protect others around you: Learn how to safely use, store and throw away your medicines at www.disposemymeds.org.          This list is accurate as of 6/11/18 11:59 PM.  Always use your most recent med list.                   Brand Name Dispense Instructions for use Diagnosis    NO ACTIVE MEDICATIONS      .        TYLENOL 325 MG Caps   Generic drug:  Acetaminophen      Take 325-650 mg by mouth every 4 hours as needed

## 2018-06-11 NOTE — PROGRESS NOTES
"Dot presents to the office s/p one week removal of hardware of the right foot .  The patient relates keeping the bandages clean, dry and intact.  The patient relates good compliance with postoperative instructions.  The patient denies any severe pain, fevers, chills, nausea or vomiting.      /80 (BP Location: Left arm, Patient Position: Sitting, Cuff Size: Adult Regular)  Pulse 68  Ht 5' 4.25\" (1.632 m)  Wt 170 lb (77.1 kg)  BMI 28.95 kg/m2  Weight management plan: Patient was referred to their PCP to discuss a diet and exercise plan.    Physical Exam:    General: The patient appears to be in no distress and in good spirits.  The bandages appear clean, dry and intact with no strikethrough noted. Vascular exam: Neurovascular status unchanged with < 3 sec capillary refill to all digits.  Positive pedal pulses and epicritic sensations intact with no evidence of allodynia.  One notes moderate edema to the forefoot on the right.  Sutures are intact and the skin is well coapted with no erythema noted.           Assessment: Painful retained hardware status post one week removal of hardware of the right foot.    Plan:  Sutures remain intact.  A sterile dressing was reapplied.  The patient was instructed to continue non-weightbearing to the right foot.  The patient is to keep the dressings clean, dry and intact and to continue with elevation of the right foot.  The patient will return to the office in one week for suture removal.    Disclaimer: This note consists of symbols derived from keyboarding, dictation and/or voice recognition software. As a result, there may be errors in the script that have gone undetected. Please consider this when interpreting information found in this chart.       RUSSEL Coleman D.P.M., FCANDACE.F.A.S.    "

## 2018-06-21 ENCOUNTER — OFFICE VISIT (OUTPATIENT)
Dept: PODIATRY | Facility: CLINIC | Age: 53
End: 2018-06-21
Payer: COMMERCIAL

## 2018-06-21 VITALS
BODY MASS INDEX: 29.02 KG/M2 | DIASTOLIC BLOOD PRESSURE: 66 MMHG | HEART RATE: 71 BPM | HEIGHT: 64 IN | SYSTOLIC BLOOD PRESSURE: 103 MMHG | WEIGHT: 170 LBS

## 2018-06-21 DIAGNOSIS — Z98.890 S/P FOOT SURGERY, RIGHT: Primary | ICD-10-CM

## 2018-06-21 PROCEDURE — 99024 POSTOP FOLLOW-UP VISIT: CPT | Performed by: PODIATRIST

## 2018-06-21 NOTE — MR AVS SNAPSHOT
"              After Visit Summary   6/21/2018    Dot Kenny    MRN: [id number]           Patient Information     Date Of Birth          1965        Visit Information        Provider Department      6/21/2018 8:40 AM Jean Carlos Coleman DPM [facility]        Today's Diagnoses     S/P foot surgery, right    -  1       Follow-ups after your visit        Follow-up notes from your care team     Return in about 7 days (around 6/28/2018).      Your next 10 appointments already scheduled     Jun 28, 2018  1:40 PM CDT   Return Visit with Jean Carlos Coleman DPM   [facility] ([facility])    [address]  [address]  [facility] [address] [phone]              Who to contact     If you have questions or need follow up information about today's clinic visit or your schedule please contact [facility] directly at [phone].  Normal or non-critical lab and imaging results will be communicated to you by MyChart, letter or phone within 4 business days after the clinic has received the results. If you do not hear from us within 7 days, please contact the clinic through MyChart or phone. If you have a critical or abnormal lab result, we will notify you by phone as soon as possible.  Submit refill requests through [employer] or call your pharmacy and they will forward the refill request to us. Please allow 3 business days for your refill to be completed.          Additional Information About Your Visit        Care EveryWhere ID     This is your Care EveryWhere ID. This could be used by other organizations to access your [facility] medical records  [id number]        Your Vitals Were     Pulse Height BMI (Body Mass Index)             71 5' 4.25\" (1.632 m) 28.95 kg/m2          Blood Pressure from Last 3 Encounters:   06/21/18 103/66   06/11/18 107/80   06/07/18 119/76    Weight from Last 3 Encounters: "   06/21/18 170 lb (77.1 kg)   06/11/18 170 lb (77.1 kg)   06/07/18 170 lb (77.1 kg)              Today, you had the following     No orders found for display       Primary Care Provider Office Phone # Fax #    Mayito Jordyn Dumont [phone] [address] [facility] [address]        Equal Access to Services     ABNER PIMENTEL : Hadii aad ku hadasho Soomaali, waaxda luqadaha, qaybta kaalmada adeegyada, waxay idiin hayaan adeeg kharash la'norm . So [facility] [phone].    ATENCIÓN: Si yeseniala viri, tiene a cade disposición servicios gratuitos de asistencia lingüística. Jjame al [phone].    We comply with applicable federal civil rights laws and [place] laws. We do not discriminate on the basis of race, color, national origin, age, disability, sex, sexual orientation, or gender identity.            Thank you!     Thank you for choosing [facility]  for your care. Our goal is always to provide you with excellent care. Hearing back from our patients is one way we can continue to improve our services. Please take a few minutes to complete the written survey that you may receive in the mail after your visit with us. Thank you!             Your Updated Medication List - Protect others around you: Learn how to safely use, store and throw away your medicines at www.disposemymeds.org.          This list is accurate as of 6/21/18 11:59 PM.  Always use your most recent med list.                   Brand Name Dispense Instructions for use Diagnosis    NO ACTIVE MEDICATIONS      .        TYLENOL 325 MG Caps   Generic drug:  Acetaminophen      Take 325-650 mg by mouth every 4 hours as needed

## 2018-06-21 NOTE — LETTER
"    6/21/2018         RE: Dot Kenny  [address] [address]        Dear Colleague,    Thank you for referring your patient, Dot Kenny, to the [facility]. Please see a copy of my visit note below.    Dot presents to the office s/p 2 weeks removal of hardware of the right foot .  The patient relates keeping the bandages clean, dry and intact.  The patient relates good compliance with postoperative instructions.  The patient denies any severe pain, fevers, chills, nausea or vomiting.      /66 (BP Location: Left arm, Patient Position: Sitting, Cuff Size: Adult Regular)  Pulse 71  Ht 5' 4.25\" (1.632 m)  Wt 170 lb (77.1 kg)  BMI 28.95 kg/m2  Weight management plan: Patient was referred to their PCP to discuss a diet and exercise plan.    Physical Exam:    General: The patient appears to be in no distress and in good spirits.  The bandages appear clean, dry and intact with no strikethrough noted. Vascular exam: Neurovascular status unchanged with < 3 sec capillary refill to all digits.  Positive pedal pulses and epicritic sensations intact with no evidence of allodynia.  One notes moderate edema to the forefoot on the right.  Sutures are intact and the skin is well coapted with no erythema noted.           Assessment: Painful retained hardware status post 2 weeks removal of hardware of the right foot.    Plan:  Sutures were removed and Steri-Strips applied.  A sterile dressing was reapplied.  The patient was instructed to continue non-weightbearing to the right foot.  The patient is to keep the dressings clean, dry and intact for 3 days.  The patient will return in 1 month for reevaluation.    Disclaimer: This note consists of symbols derived from keyboarding, dictation and/or voice recognition software. As a result, there may be errors in the script that have gone undetected. Please consider this when interpreting information found in this chart.     "   RUSSEL Coleman D.P.M., FCANDACE.F.A.S.    Again, thank you for allowing me to participate in the care of your patient.        Sincerely,        Jean Carlos Coleman DPM

## 2018-06-21 NOTE — PROGRESS NOTES
"Dot presents to the office s/p 2 weeks removal of hardware of the right foot .  The patient relates keeping the bandages clean, dry and intact.  The patient relates good compliance with postoperative instructions.  The patient denies any severe pain, fevers, chills, nausea or vomiting.      /66 (BP Location: Left arm, Patient Position: Sitting, Cuff Size: Adult Regular)  Pulse 71  Ht 5' 4.25\" (1.632 m)  Wt 170 lb (77.1 kg)  BMI 28.95 kg/m2  Weight management plan: Patient was referred to their PCP to discuss a diet and exercise plan.    Physical Exam:    General: The patient appears to be in no distress and in good spirits.  The bandages appear clean, dry and intact with no strikethrough noted. Vascular exam: Neurovascular status unchanged with < 3 sec capillary refill to all digits.  Positive pedal pulses and epicritic sensations intact with no evidence of allodynia.  One notes moderate edema to the forefoot on the right.  Sutures are intact and the skin is well coapted with no erythema noted.           Assessment: Painful retained hardware status post 2 weeks removal of hardware of the right foot.    Plan:  Sutures remain intact.  A sterile dressing was reapplied.  The patient was instructed to continue non-weightbearing to the right foot.  The patient is return in one week for suture removal.    Disclaimer: This note consists of symbols derived from keyboarding, dictation and/or voice recognition software. As a result, there may be errors in the script that have gone undetected. Please consider this when interpreting information found in this chart.       RUSSEL Coleman D.P.M., F.RAKESH.BARBARA.F.A.S.  "

## 2018-06-28 ENCOUNTER — OFFICE VISIT (OUTPATIENT)
Dept: PODIATRY | Facility: CLINIC | Age: 53
End: 2018-06-28
Payer: COMMERCIAL

## 2018-06-28 VITALS — BODY MASS INDEX: 29.02 KG/M2 | HEIGHT: 64 IN | WEIGHT: 170 LBS

## 2018-06-28 DIAGNOSIS — Z98.890 S/P FOOT SURGERY, RIGHT: Primary | ICD-10-CM

## 2018-06-28 PROCEDURE — 99024 POSTOP FOLLOW-UP VISIT: CPT | Performed by: PODIATRIST

## 2018-06-28 NOTE — MR AVS SNAPSHOT
After Visit Summary   6/28/2018    Dot Kenny    MRN: [id number]           Patient Information     Date Of Birth          1965        Visit Information        Provider Department      6/28/2018 1:40 PM Jean Carlos Coleman DPM [facility]        Today's Diagnoses     S/P foot surgery, right    -  1      Care Instructions    Your sutures were removed today. Steri strips were applied.  You may now take a shower but do not soak your foot for the next 3 days. The steri strips will fall off by themselves. Please do not pull them off, trim edges as needed.    After 3 days soak your foot in warm water with Epsom's Salt  For 20 minutes 1-2 times per day.     Remain non-weightbearing unless instructed otherwise     Start simple range of motion exercises      Return in 1 month                Follow-ups after your visit        Follow-up notes from your care team     Return in about 4 weeks (around 7/26/2018), or if symptoms worsen or fail to improve.      Who to contact     If you have questions or need follow up information about today's clinic visit or your schedule please contact [facility] directly at [phone].  Normal or non-critical lab and imaging results will be communicated to you by MyChart, letter or phone within 4 business days after the clinic has received the results. If you do not hear from us within 7 days, please contact the clinic through MyChart or phone. If you have a critical or abnormal lab result, we will notify you by phone as soon as possible.  Submit refill requests through [employer] or call your pharmacy and they will forward the refill request to us. Please allow 3 business days for your refill to be completed.          Additional Information About Your Visit        Care EveryWhere ID     This is your Care EveryWhere ID. This could be used by other organizations to access your [facility]  "records  [id number]        Your Vitals Were     Height BMI (Body Mass Index)                5' 4\" (1.626 m) 29.18 kg/m2           Blood Pressure from Last 3 Encounters:   06/28/18 (P) 105/70   06/21/18 103/66   06/11/18 107/80    Weight from Last 3 Encounters:   06/28/18 170 lb (77.1 kg)   06/21/18 170 lb (77.1 kg)   06/11/18 170 lb (77.1 kg)              Today, you had the following     No orders found for display       Primary Care Provider Office Phone # Fax #    Chetanmary Jordyn Dumont [phone] [address] [address]        Equal Access to Services     ABNER PIMENTEL : Hadmary rosarioo Sonirmal, waaxda luqadaha, qaybta kaalmada adeegyada, luis alfredo saleh . So [facility] [phone].    ATENCIÓN: Si habla español, tiene a cade disposición servicios gratuitos de asistencia lingüística. [facility] [phone].    We comply with applicable federal civil rights laws and [place] laws. We do not discriminate on the basis of race, color, national origin, age, disability, sex, sexual orientation, or gender identity.            Thank you!     Thank you for choosing [facility]  for your care. Our goal is always to provide you with excellent care. Hearing back from our patients is one way we can continue to improve our services. Please take a few minutes to complete the written survey that you may receive in the mail after your visit with us. Thank you!             Your Updated Medication List - Protect others around you: Learn how to safely use, store and throw away your medicines at www.disposemymeds.org.          This list is accurate as of 6/28/18  1:58 PM.  Always use your most recent med list.                   Brand Name Dispense Instructions for use Diagnosis    NO ACTIVE MEDICATIONS      .        TYLENOL 325 MG Caps   Generic drug:  Acetaminophen      Take 325-650 mg by mouth every 4 hours as needed          "

## 2018-06-28 NOTE — PROGRESS NOTES
"Dot presents to the office s/p 3 weeks removal of hardware of the right foot .  The patient relates keeping the bandages clean, dry and intact.  The patient relates good compliance with postoperative instructions.  The patient denies any severe pain, fevers, chills, nausea or vomiting.      BP (P) 105/70 (BP Location: Right leg, Patient Position: Sitting, Cuff Size: Adult Regular)  Pulse (P) 69  Ht 5' 4\" (1.626 m)  Wt 170 lb (77.1 kg)  BMI 29.18 kg/m2  Weight management plan: Patient was referred to their PCP to discuss a diet and exercise plan.    Physical Exam:    General: The patient appears to be in no distress and in good spirits.  The bandages appear clean, dry and intact with no strikethrough noted. Vascular exam: Neurovascular status unchanged with < 3 sec capillary refill to all digits.  Positive pedal pulses and epicritic sensations intact with no evidence of allodynia.  One notes moderate edema to the forefoot on the right.  Sutures are intact and the skin is well coapted with no erythema noted.           Assessment: Painful retained hardware status post 3 weeks removal of hardware of the right foot.    Plan:  Sutures were removed and Steri-Strips applied.  A sterile dressing was reapplied.  The patient was instructed to continue non-weightbearing to the right foot.  The patient is to keep the dressings clean, dry and intact for 3 days.  The patient will return in 1 month for reevaluation if any problems persist..    Disclaimer: This note consists of symbols derived from keyboarding, dictation and/or voice recognition software. As a result, there may be errors in the script that have gone undetected. Please consider this when interpreting information found in this chart.       RUSSEL Coleman D.P.M., FCANDACE.F.A.S.  "

## 2018-06-28 NOTE — LETTER
"    6/28/2018         RE: Dot Kenny  [address] [address]        Dear Colleague,    Thank you for referring your patient, Dot Kenny, to the [facility]. Please see a copy of my visit note below.    Dot presents to the office s/p 3 weeks removal of hardware of the right foot .  The patient relates keeping the bandages clean, dry and intact.  The patient relates good compliance with postoperative instructions.  The patient denies any severe pain, fevers, chills, nausea or vomiting.      BP (P) 105/70 (BP Location: Right leg, Patient Position: Sitting, Cuff Size: Adult Regular)  Pulse (P) 69  Ht 5' 4\" (1.626 m)  Wt 170 lb (77.1 kg)  BMI 29.18 kg/m2  Weight management plan: Patient was referred to their PCP to discuss a diet and exercise plan.    Physical Exam:    General: The patient appears to be in no distress and in good spirits.  The bandages appear clean, dry and intact with no strikethrough noted. Vascular exam: Neurovascular status unchanged with < 3 sec capillary refill to all digits.  Positive pedal pulses and epicritic sensations intact with no evidence of allodynia.  One notes moderate edema to the forefoot on the right.  Sutures are intact and the skin is well coapted with no erythema noted.           Assessment: Painful retained hardware status post 3 weeks removal of hardware of the right foot.    Plan:  Sutures were removed and Steri-Strips applied.  A sterile dressing was reapplied.  The patient was instructed to continue non-weightbearing to the right foot.  The patient is to keep the dressings clean, dry and intact for 3 days.  The patient will return in 1 month for reevaluation if any problems persist..    Disclaimer: This note consists of symbols derived from keyboarding, dictation and/or voice recognition software. As a result, there may be errors in the script that have gone undetected. Please consider this when interpreting " information found in this chart.       RUSSEL Coleman D.P.M., WISAM.F.A.S.    Again, thank you for allowing me to participate in the care of your patient.        Sincerely,        Jean Carlos Coleman DPM

## 2018-07-17 ENCOUNTER — OFFICE VISIT (OUTPATIENT)
Dept: FAMILY MEDICINE | Facility: CLINIC | Age: 53
End: 2018-07-17
Payer: COMMERCIAL

## 2018-07-17 VITALS
BODY MASS INDEX: 28.67 KG/M2 | TEMPERATURE: 98.2 F | HEART RATE: 68 BPM | RESPIRATION RATE: 20 BRPM | WEIGHT: 167 LBS | SYSTOLIC BLOOD PRESSURE: 100 MMHG | DIASTOLIC BLOOD PRESSURE: 52 MMHG

## 2018-07-17 DIAGNOSIS — R10.32 LLQ ABDOMINAL PAIN: Primary | ICD-10-CM

## 2018-07-17 LAB
ALBUMIN SERPL-MCNC: 3.6 G/DL (ref 3.4–5)
ALBUMIN UR-MCNC: ABNORMAL MG/DL
ALP SERPL-CCNC: 52 U/L (ref 40–150)
ALT SERPL W P-5'-P-CCNC: 18 U/L (ref 0–50)
ANION GAP SERPL CALCULATED.3IONS-SCNC: 7 MMOL/L (ref 3–14)
APPEARANCE UR: CLEAR
AST SERPL W P-5'-P-CCNC: 11 U/L (ref 0–45)
BACTERIA #/AREA URNS HPF: ABNORMAL /HPF
BASOPHILS # BLD AUTO: 0 10E9/L (ref 0–0.2)
BASOPHILS NFR BLD AUTO: 0.2 %
BILIRUB SERPL-MCNC: 0.5 MG/DL (ref 0.2–1.3)
BILIRUB UR QL STRIP: NEGATIVE
BUN SERPL-MCNC: 13 MG/DL (ref 7–30)
CALCIUM SERPL-MCNC: 8.3 MG/DL (ref 8.5–10.1)
CHLORIDE SERPL-SCNC: 109 MMOL/L (ref 94–109)
CO2 SERPL-SCNC: 22 MMOL/L (ref 20–32)
COLOR UR AUTO: YELLOW
CREAT SERPL-MCNC: 0.99 MG/DL (ref 0.52–1.04)
DIFFERENTIAL METHOD BLD: NORMAL
EOSINOPHIL # BLD AUTO: 0 10E9/L (ref 0–0.7)
EOSINOPHIL NFR BLD AUTO: 0.7 %
ERYTHROCYTE [DISTWIDTH] IN BLOOD BY AUTOMATED COUNT: 13 % (ref 10–15)
GFR SERPL CREATININE-BSD FRML MDRD: 59 ML/MIN/1.7M2
GLUCOSE SERPL-MCNC: 95 MG/DL (ref 70–99)
GLUCOSE UR STRIP-MCNC: NEGATIVE MG/DL
HCT VFR BLD AUTO: 36.9 % (ref 35–47)
HGB BLD-MCNC: 12.2 G/DL (ref 11.7–15.7)
HGB UR QL STRIP: ABNORMAL
KETONES UR STRIP-MCNC: ABNORMAL MG/DL
LEUKOCYTE ESTERASE UR QL STRIP: NEGATIVE
LIPASE SERPL-CCNC: 182 U/L (ref 73–393)
LYMPHOCYTES # BLD AUTO: 1.2 10E9/L (ref 0.8–5.3)
LYMPHOCYTES NFR BLD AUTO: 22.2 %
MCH RBC QN AUTO: 31.3 PG (ref 26.5–33)
MCHC RBC AUTO-ENTMCNC: 33.1 G/DL (ref 31.5–36.5)
MCV RBC AUTO: 95 FL (ref 78–100)
MONOCYTES # BLD AUTO: 0.4 10E9/L (ref 0–1.3)
MONOCYTES NFR BLD AUTO: 7.7 %
MUCOUS THREADS #/AREA URNS LPF: PRESENT /LPF
NEUTROPHILS # BLD AUTO: 3.9 10E9/L (ref 1.6–8.3)
NEUTROPHILS NFR BLD AUTO: 69.2 %
NITRATE UR QL: NEGATIVE
NON-SQ EPI CELLS #/AREA URNS LPF: ABNORMAL /LPF
PH UR STRIP: 5.5 PH (ref 5–7)
PLATELET # BLD AUTO: 235 10E9/L (ref 150–450)
POTASSIUM SERPL-SCNC: 4.3 MMOL/L (ref 3.4–5.3)
PROT SERPL-MCNC: 7 G/DL (ref 6.8–8.8)
RBC # BLD AUTO: 3.9 10E12/L (ref 3.8–5.2)
RBC #/AREA URNS AUTO: ABNORMAL /HPF
SODIUM SERPL-SCNC: 138 MMOL/L (ref 133–144)
SOURCE: ABNORMAL
SP GR UR STRIP: >1.03 (ref 1–1.03)
UROBILINOGEN UR STRIP-ACNC: 0.2 EU/DL (ref 0.2–1)
WBC # BLD AUTO: 5.6 10E9/L (ref 4–11)
WBC #/AREA URNS AUTO: ABNORMAL /HPF

## 2018-07-17 PROCEDURE — 36415 COLL VENOUS BLD VENIPUNCTURE: CPT | Performed by: NURSE PRACTITIONER

## 2018-07-17 PROCEDURE — 99214 OFFICE O/P EST MOD 30 MIN: CPT | Performed by: NURSE PRACTITIONER

## 2018-07-17 PROCEDURE — 81001 URINALYSIS AUTO W/SCOPE: CPT | Performed by: NURSE PRACTITIONER

## 2018-07-17 PROCEDURE — 80053 COMPREHEN METABOLIC PANEL: CPT | Performed by: NURSE PRACTITIONER

## 2018-07-17 PROCEDURE — 83690 ASSAY OF LIPASE: CPT | Performed by: NURSE PRACTITIONER

## 2018-07-17 PROCEDURE — 85025 COMPLETE CBC W/AUTO DIFF WBC: CPT | Performed by: NURSE PRACTITIONER

## 2018-07-17 PROCEDURE — 87086 URINE CULTURE/COLONY COUNT: CPT | Performed by: NURSE PRACTITIONER

## 2018-07-17 ASSESSMENT — PAIN SCALES - GENERAL: PAINLEVEL: MODERATE PAIN (5)

## 2018-07-17 NOTE — PROGRESS NOTES
SUBJECTIVE:   Dot Kenny is a 53 year old female who presents to clinic today for the following health issues:    ABDOMINAL PAIN     Onset: 1.5 days     Description:   Character: Sharp, Stabbing and pulling   Location: left lower quadrant  Radiation: None    Intensity: moderate    Progression of Symptoms:  worsening    Accompanying Signs & Symptoms:  Fever/Chills?: YES- has had cold sweats from pain at worst   Gas/Bloating: YES- bloating   Nausea: no   Vomitting: no   Diarrhea?: no   Constipation:no   Dysuria or Hematuria: no    History:   Trauma: no   Previous similar pain: YES- similar to ovarian cyst    Previous tests done: none    Precipitating factors:   Does the pain change with:     Food: no      BM: no     Urination: YES- feels best when bladder is full, when empties side hurts     Alleviating factors:  None     Therapies Tried and outcome: Tylenol     LMP:  not applicable       Problem list and histories reviewed & adjusted, as indicated.  Additional history: as documented    Patient Active Problem List   Diagnosis     CARDIOVASCULAR SCREENING; LDL GOAL LESS THAN 160     Dysmenorrhea     ASCUS favor benign     Past Surgical History:   Procedure Laterality Date     BUNIONECTOMY Right 6/14/2016    Procedure: BUNIONECTOMY;  Surgeon: Jean Carlos Coleman DPM;  Location: [facility]      APPENDECTOMY  2000    appendectomy      REMOVE HARDWARE FOOT Right 6/5/2018    Procedure: REMOVE HARDWARE FOOT;  Removal of Hardware Right Foot;  Surgeon: Jean Carlos Coleman DPM;  Location: [facility]       Social History   Substance Use Topics     Smoking status: Never Smoker     Smokeless tobacco: Never Used     Alcohol use No     Family History   Problem Relation Age of Onset     GASTROINTESTINAL DISEASE Mother      Diverticulitis     Hypertension Father      Thyroid Disease Paternal Grandfather      Diabetes No family hx of      Breast Cancer No family hx of      Cancer - colorectal No family hx of      Alcohol/Drug No family  hx of      Lipids No family hx of          Current Outpatient Prescriptions   Medication Sig Dispense Refill     Acetaminophen (TYLENOL) 325 MG CAPS Take 325-650 mg by mouth every 4 hours as needed       NO ACTIVE MEDICATIONS .       Allergies   Allergen Reactions     Cefzil [Cefprozil]      rash     Sulfa Drugs      Rash         Reviewed and updated as needed this visit by clinical staff       Reviewed and updated as needed this visit by Provider         ROS:  Constitutional, HEENT, cardiovascular, pulmonary, gi and gu systems are negative, except as otherwise noted.    OBJECTIVE:     /52 (BP Location: Right arm, Cuff Size: Adult Large)  Pulse 68  Temp 98.2  F (36.8  C) (Tympanic)  Resp 20  Wt 167 lb (75.8 kg)  BMI 28.67 kg/m2  Body mass index is 28.67 kg/(m^2).   GENERAL: healthy, alert and no distress  EYES: Eyes grossly normal to inspection, PERRL and conjunctivae and sclerae normal  HENT: ear canals and TM's normal, nose and mouth without ulcers or lesions  NECK: no adenopathy, no asymmetry, masses, or scars and thyroid normal to palpation  RESP: lungs clear to auscultation - no rales, rhonchi or wheezes  CV: regular rate and rhythm, normal S1 S2, no S3 or S4, no murmur, click or rub, no peripheral edema and peripheral pulses strong  ABDOMEN: soft, no hepatosplenomegaly, no masses and bowel sounds normal  ABDOMEN: tender lower left   MS: no gross musculoskeletal defects noted, no edema  SKIN: no suspicious lesions or rashes  NEURO: Normal strength and tone, mentation intact and speech normal  PSYCH: mentation appears normal, affect normal/bright  Results for orders placed or performed in visit on 07/17/18   CBC with platelets differential   Result Value Ref Range    WBC 5.6 4.0 - 11.0 10e9/L    RBC Count 3.90 3.8 - 5.2 10e12/L    Hemoglobin 12.2 11.7 - 15.7 g/dL    Hematocrit 36.9 35.0 - 47.0 %    MCV 95 78 - 100 fl    MCH 31.3 26.5 - 33.0 pg    MCHC 33.1 31.5 - 36.5 g/dL    RDW 13.0 10.0 - 15.0 %     Platelet Count 235 150 - 450 10e9/L    Diff Method Automated Method     % Neutrophils 69.2 %    % Lymphocytes 22.2 %    % Monocytes 7.7 %    % Eosinophils 0.7 %    % Basophils 0.2 %    Absolute Neutrophil 3.9 1.6 - 8.3 10e9/L    Absolute Lymphocytes 1.2 0.8 - 5.3 10e9/L    Absolute Monocytes 0.4 0.0 - 1.3 10e9/L    Absolute Eosinophils 0.0 0.0 - 0.7 10e9/L    Absolute Basophils 0.0 0.0 - 0.2 10e9/L   Comprehensive metabolic panel   Result Value Ref Range    Sodium 138 133 - 144 mmol/L    Potassium 4.3 3.4 - 5.3 mmol/L    Chloride 109 94 - 109 mmol/L    Carbon Dioxide 22 20 - 32 mmol/L    Anion Gap 7 3 - 14 mmol/L    Glucose 95 70 - 99 mg/dL    Urea Nitrogen 13 7 - 30 mg/dL    Creatinine 0.99 0.52 - 1.04 mg/dL    GFR Estimate 59 (L) >60 mL/min/1.7m2    GFR Estimate If Black 72 >60 mL/min/1.7m2    Calcium 8.3 (L) 8.5 - 10.1 mg/dL    Bilirubin Total 0.5 0.2 - 1.3 mg/dL    Albumin 3.6 3.4 - 5.0 g/dL    Protein Total 7.0 6.8 - 8.8 g/dL    Alkaline Phosphatase 52 40 - 150 U/L    ALT 18 0 - 50 U/L    AST 11 0 - 45 U/L   Lipase   Result Value Ref Range    Lipase 182 73 - 393 U/L   UA reflex to Microscopic and Culture   Result Value Ref Range    Color Urine Yellow     Appearance Urine Clear     Glucose Urine Negative NEG^Negative mg/dL    Bilirubin Urine Negative NEG^Negative    Ketones Urine Trace (A) NEG^Negative mg/dL    Specific Gravity Urine >1.030 1.003 - 1.035    Blood Urine Moderate (A) NEG^Negative    pH Urine 5.5 5.0 - 7.0 pH    Protein Albumin Urine Trace (A) NEG^Negative mg/dL    Urobilinogen Urine 0.2 0.2 - 1.0 EU/dL    Nitrite Urine Negative NEG^Negative    Leukocyte Esterase Urine Negative NEG^Negative    Source Midstream Urine    Urine Microscopic   Result Value Ref Range    WBC Urine 0 - 5 OTO5^0 - 5 /HPF    RBC Urine O - 2 OTO2^O - 2 /HPF    Squamous Epithelial /LPF Urine Moderate (A) FEW^Few /LPF    Bacteria Urine Moderate (A) NEG^Negative /HPF    Mucous Urine Present (A) NEG^Negative /LPF   Urine Culture  Aerobic Bacterial   Result Value Ref Range    Specimen Description Midstream Urine     Special Requests Specimen received in preservative     Culture Micro PENDING          ASSESSMENT:       ICD-10-CM    1. LLQ abdominal pain R10.32 CBC with platelets differential     Comprehensive metabolic panel     Lipase     UA reflex to Microscopic and Culture     Urine Microscopic     US Pelvic Complete w Transvaginal     Urine Culture Aerobic Bacterial         PLAN:   Comprehensive panel within normal limits most concerning for questionable ovarian cyst will obtain ultrasound.    Discussed results of evaluation and tests today.  Observe symptoms x 12-24 hours. To ER if significant increase in symptoms or concerning symptoms occur. Symptomatic care discussed.      Patient Instructions   Will contact you with lab results and further work up    Ultra sound number is [phone].    Follow-up with your primary care provider next week and as needed.    Indications for emergent return to emergency department discussed with patient, who verbalized good understanding and agreement.  Patient understands the limitations of today's evaluation.           *Abdominal Pain, Unknown Cause (Female)    The exact cause of your abdominal (stomach) pain is not certain. This does not mean that this is something to worry about, or the right tests were not done. Everyone likes to know the exact cause of the problem, but sometimes with abdominal pain, there is no clear-cut cause, and this could be a good thing. The good news is that your symptoms can be treated, and you will feel better.   Your condition does not seem serious now; however, sometimes the signs of a serious problem may take more time to appear. For this reason, it is important for you to watch for any new symptoms, problems, or worsening of your condition.  Over the next few days, the abdominal pain may come and go, or be continuous. Other common symptoms can include nausea and  vomiting. Sometimes it can be difficult to tell if you feel nauseous, you may just feel bad and not associate that feeling with nausea. Constipation, diarrhea, and a fever may go along with the pain.  The pain may continue even if treated correctly over the following days. Depending on how things go, sometimes the cause can become clear and may require further or different treatment. Additional evaluations, medications, or tests may be needed.  Home care  Your health care provider may prescribe medications for pain, symptoms, or an infection.  Follow the health care provider's instructions for taking these medications.  General care    Rest until your next exam. No strenuous activities.    Try to find positions that ease discomfort. A small pillow placed on the abdomen may help relieve pain.    Something warm on your abdomen (such as a heating pad) may help, but be careful not to burn yourself.  Diet    Do not force yourself to eat, especially if having cramps, vomiting, or diarrhea.    Water is important so you do not get dehydrated. Soup may also be good. Sports drinks may also help, especially if they are not too acidic. Make sure you don't drink sugary drinks as this can make things worse. Take liquids in small amounts. Do not guzzle them.    Caffeine sometimes makes the pain and cramping worse.    Avoid dairy products if you have vomiting or diarrhea.    Don't eat large amounts at a time. Wait a few minutes between bites.    Eat a diet low in fiber (called a low-residue diet). Foods allowed include refined breads, white rice, fruit and vegetable juices without pulp, tender meats. These foods will pass more easily through the intestine.    Avoid fried or fatty foods, dairy, alcohol and spicy foods until your symptoms go away.  Follow-up care  Follow up with your health care provider as instructed, or if your pain does not begin to improve in the next 24 hours.  When to seek medical care  Seek prompt medical care  if any of the following occur:    Pain gets worse or moves to the right lower abdomen    New or worsening vomiting or diarrhea    Swelling of the abdomen    Unable to pass stool for more than three days    New fever over 101  F (38.3 C), or rising fever    Blood in vomit or bowel movements (dark red or black color)    Jaundice (yellow color of eyes and skin)    Weakness, dizziness    Chest, arm, back, neck or jaw pain    Unexpected vaginal bleeding or missed period  Call 911  Call emergency services if any of the following occur:    Trouble breathing    Confusion    Fainting or loss of consciousness    Rapid heart rate    Seizure    [id number] [facility], [address] [place], [place] [address]. All rights reserved. This information is not intended as a substitute for professional medical care. Always follow your healthcare professional's instructions.            DENAE Baker [facility]

## 2018-07-17 NOTE — NURSING NOTE
"Chief Complaint   Patient presents with     Abdominal Pain       Initial /52 (BP Location: Right arm, Cuff Size: Adult Large)  Pulse 68  Temp 98.2  F (36.8  C) (Tympanic)  Resp 20  Wt 167 lb (75.8 kg)  BMI 28.67 kg/m2 Estimated body mass index is 28.67 kg/(m^2) as calculated from the following:    Height as of 6/28/18: 5' 4\" (1.626 m).    Weight as of this encounter: 167 lb (75.8 kg).      Health Maintenance that is potentially due pending provider review:  NONE    n/a    Is there anyone who you would like to be able to receive your results? Not Applicable  If yes have patient fill out SENTHIL  Prieto Cleary M.A.        "

## 2018-07-17 NOTE — MR AVS SNAPSHOT
After Visit Summary   7/17/2018    Dot Kenny    MRN: [id number]           Patient Information     Date Of Birth          1965        Visit Information        Provider Department      7/17/2018 12:40 PM Latha Williamson APRN CNP [facility]        Today's Diagnoses     LLQ abdominal pain    -  1      Care Instructions    Will contact you with lab results and further work up    Ultra sound number is [phone].    Follow-up with your primary care provider next week and as needed.    Indications for emergent return to emergency department discussed with patient, who verbalized good understanding and agreement.  Patient understands the limitations of today's evaluation.           *Abdominal Pain, Unknown Cause (Female)    The exact cause of your abdominal (stomach) pain is not certain. This does not mean that this is something to worry about, or the right tests were not done. Everyone likes to know the exact cause of the problem, but sometimes with abdominal pain, there is no clear-cut cause, and this could be a good thing. The good news is that your symptoms can be treated, and you will feel better.   Your condition does not seem serious now; however, sometimes the signs of a serious problem may take more time to appear. For this reason, it is important for you to watch for any new symptoms, problems, or worsening of your condition.  Over the next few days, the abdominal pain may come and go, or be continuous. Other common symptoms can include nausea and vomiting. Sometimes it can be difficult to tell if you feel nauseous, you may just feel bad and not associate that feeling with nausea. Constipation, diarrhea, and a fever may go along with the pain.  The pain may continue even if treated correctly over the following days. Depending on how things go, sometimes the cause can become clear and may require further or different treatment. Additional evaluations, medications, or  tests may be needed.  Home care  Your health care provider may prescribe medications for pain, symptoms, or an infection.  Follow the health care provider's instructions for taking these medications.  General care    Rest until your next exam. No strenuous activities.    Try to find positions that ease discomfort. A small pillow placed on the abdomen may help relieve pain.    Something warm on your abdomen (such as a heating pad) may help, but be careful not to burn yourself.  Diet    Do not force yourself to eat, especially if having cramps, vomiting, or diarrhea.    Water is important so you do not get dehydrated. Soup may also be good. Sports drinks may also help, especially if they are not too acidic. Make sure you don't drink sugary drinks as this can make things worse. Take liquids in small amounts. Do not guzzle them.    Caffeine sometimes makes the pain and cramping worse.    Avoid dairy products if you have vomiting or diarrhea.    Don't eat large amounts at a time. Wait a few minutes between bites.    Eat a diet low in fiber (called a low-residue diet). Foods allowed include refined breads, white rice, fruit and vegetable juices without pulp, tender meats. These foods will pass more easily through the intestine.    Avoid fried or fatty foods, dairy, alcohol and spicy foods until your symptoms go away.  Follow-up care  Follow up with your health care provider as instructed, or if your pain does not begin to improve in the next 24 hours.  When to seek medical care  Seek prompt medical care if any of the following occur:    Pain gets worse or moves to the right lower abdomen    New or worsening vomiting or diarrhea    Swelling of the abdomen    Unable to pass stool for more than three days    New fever over 101  F (38.3 C), or rising fever    Blood in vomit or bowel movements (dark red or black color)    Jaundice (yellow color of eyes and skin)    Weakness, dizziness    Chest, arm, back, neck or jaw  pain    Unexpected vaginal bleeding or missed period  Call 911  Call emergency services if any of the following occur:    Trouble breathing    Confusion    Fainting or loss of consciousness    Rapid heart rate    Seizure    [id number] Keenan Lieberman, [address] [facility], [place] [address]. All rights reserved. This information is not intended as a substitute for professional medical care. Always follow your healthcare professional's instructions.                Follow-ups after your visit        Future tests that were ordered for you today     Open Future Orders        Priority Expected Expires Ordered    US Pelvic Complete w Transvaginal Routine  7/17/2019 7/17/2018            Who to contact     If you have questions or need follow up information about today's clinic visit or your schedule please contact [facility] directly at [phone].  Normal or non-critical lab and imaging results will be communicated to you by MyChart, letter or phone within 4 business days after the clinic has received the results. If you do not hear from us within 7 days, please contact the clinic through MyChart or phone. If you have a critical or abnormal lab result, we will notify you by phone as soon as possible.  Submit refill requests through [facility] or call your pharmacy and they will forward the refill request to us. Please allow 3 business days for your refill to be completed.          Additional Information About Your Visit        Care EveryWhere ID     This is your Care EveryWhere ID. This could be used by other organizations to access your [facility] medical records  [id number]        Your Vitals Were     Pulse Temperature Respirations BMI (Body Mass Index)          68 98.2  F (36.8  C) (Tympanic) 20 28.67 kg/m2         Blood Pressure from Last 3 Encounters:   07/17/18 100/52   06/28/18 (P) 105/70   06/21/18 103/66    Weight from Last 3 Encounters:   07/17/18 167 lb (75.8 kg)   06/28/18 170 lb (77.1 kg)    06/21/18 170 lb (77.1 kg)              We Performed the Following     CBC with platelets differential     Comprehensive metabolic panel     Lipase     UA reflex to Microscopic and Culture     Urine Microscopic        Primary Care Provider Office Phone # Fax #    Chetanmary Jordyn Dumont [phone] [address]  [facility] [address]        Equal Access to Services     [facility]: Hadii aad ku hadasho Soomaali, waaxda luqadaha, qaybta kaalmada adeegyada, waxay idiin hayaan adeeg kharash la'aan . So [facility] [phone].    ATENCIÓN: Si habla español, tiene a cade disposición servicios gratuitos de asistencia lingüística. Llame al [phone].    We comply with applicable federal civil rights laws and [place] laws. We do not discriminate on the basis of race, color, national origin, age, disability, sex, sexual orientation, or gender identity.            Thank you!     Thank you for choosing [facility]  for your care. Our goal is always to provide you with excellent care. Hearing back from our patients is one way we can continue to improve our services. Please take a few minutes to complete the written survey that you may receive in the mail after your visit with us. Thank you!             Your Updated Medication List - Protect others around you: Learn how to safely use, store and throw away your medicines at www.disposemymeds.org.          This list is accurate as of 7/17/18  1:40 PM.  Always use your most recent med list.                   Brand Name Dispense Instructions for use Diagnosis    NO ACTIVE MEDICATIONS      .        TYLENOL 325 MG Caps   Generic drug:  Acetaminophen      Take 325-650 mg by mouth every 4 hours as needed

## 2018-07-17 NOTE — PATIENT INSTRUCTIONS
Will contact you with lab results and further work up    Ultra sound number is [phone].    Follow-up with your primary care provider next week and as needed.    Indications for emergent return to emergency department discussed with patient, who verbalized good understanding and agreement.  Patient understands the limitations of today's evaluation.           *Abdominal Pain, Unknown Cause (Female)    The exact cause of your abdominal (stomach) pain is not certain. This does not mean that this is something to worry about, or the right tests were not done. Everyone likes to know the exact cause of the problem, but sometimes with abdominal pain, there is no clear-cut cause, and this could be a good thing. The good news is that your symptoms can be treated, and you will feel better.   Your condition does not seem serious now; however, sometimes the signs of a serious problem may take more time to appear. For this reason, it is important for you to watch for any new symptoms, problems, or worsening of your condition.  Over the next few days, the abdominal pain may come and go, or be continuous. Other common symptoms can include nausea and vomiting. Sometimes it can be difficult to tell if you feel nauseous, you may just feel bad and not associate that feeling with nausea. Constipation, diarrhea, and a fever may go along with the pain.  The pain may continue even if treated correctly over the following days. Depending on how things go, sometimes the cause can become clear and may require further or different treatment. Additional evaluations, medications, or tests may be needed.  Home care  Your health care provider may prescribe medications for pain, symptoms, or an infection.  Follow the health care provider's instructions for taking these medications.  General care    Rest until your next exam. No strenuous activities.    Try to find positions that ease discomfort. A small pillow placed on the abdomen may help  relieve pain.    Something warm on your abdomen (such as a heating pad) may help, but be careful not to burn yourself.  Diet    Do not force yourself to eat, especially if having cramps, vomiting, or diarrhea.    Water is important so you do not get dehydrated. Soup may also be good. Sports drinks may also help, especially if they are not too acidic. Make sure you don't drink sugary drinks as this can make things worse. Take liquids in small amounts. Do not guzzle them.    Caffeine sometimes makes the pain and cramping worse.    Avoid dairy products if you have vomiting or diarrhea.    Don't eat large amounts at a time. Wait a few minutes between bites.    Eat a diet low in fiber (called a low-residue diet). Foods allowed include refined breads, white rice, fruit and vegetable juices without pulp, tender meats. These foods will pass more easily through the intestine.    Avoid fried or fatty foods, dairy, alcohol and spicy foods until your symptoms go away.  Follow-up care  Follow up with your health care provider as instructed, or if your pain does not begin to improve in the next 24 hours.  When to seek medical care  Seek prompt medical care if any of the following occur:    Pain gets worse or moves to the right lower abdomen    New or worsening vomiting or diarrhea    Swelling of the abdomen    Unable to pass stool for more than three days    New fever over 101  F (38.3 C), or rising fever    Blood in vomit or bowel movements (dark red or black color)    Jaundice (yellow color of eyes and skin)    Weakness, dizziness    Chest, arm, back, neck or jaw pain    Unexpected vaginal bleeding or missed period  Call 911  Call emergency services if any of the following occur:    Trouble breathing    Confusion    Fainting or loss of consciousness    Rapid heart rate    Seizure    [id number] Keenan Lieberman, [address] [place] [address], [place] [address]. All rights reserved. This information is not intended as a substitute for  professional medical care. Always follow your healthcare professional's instructions.

## 2018-07-18 ENCOUNTER — HOSPITAL ENCOUNTER (OUTPATIENT)
Dept: ULTRASOUND IMAGING | Facility: CLINIC | Age: 53
Discharge: HOME | End: 2018-07-18
Attending: NURSE PRACTITIONER | Admitting: NURSE PRACTITIONER
Payer: COMMERCIAL

## 2018-07-18 DIAGNOSIS — R10.32 LLQ ABDOMINAL PAIN: ICD-10-CM

## 2018-07-18 LAB
BACTERIA SPEC CULT: NO GROWTH
Lab: NORMAL
SPECIMEN SOURCE: NORMAL

## 2018-07-18 PROCEDURE — 76830 TRANSVAGINAL US NON-OB: CPT

## 2018-07-18 NOTE — PROGRESS NOTES
Please Notify Dot  of test results ultra sound was negative.  Continue to monitor if not improving over the next days should return.      Latha Williamson CNP

## 2018-07-19 ENCOUNTER — MYC MEDICAL ADVICE (OUTPATIENT)
Dept: FAMILY MEDICINE | Facility: CLINIC | Age: 53
End: 2018-07-19

## 2018-07-19 NOTE — PROGRESS NOTES
Please Notify Dot  of test results negative urine culture. If symptoms persist should be reseen.     Latha Williamson CNP

## 2018-07-20 NOTE — TELEPHONE ENCOUNTER
Labs/ US were faxed to Dr. Estella Felder [phone]  [facility]

## 2018-08-16 ENCOUNTER — TRANSFERRED RECORDS (OUTPATIENT)
Dept: HEALTH INFORMATION MANAGEMENT | Facility: CLINIC | Age: 53
End: 2018-08-16

## 2019-01-30 ENCOUNTER — OFFICE VISIT (OUTPATIENT)
Dept: FAMILY MEDICINE | Facility: CLINIC | Age: 54
End: 2019-01-30
Payer: COMMERCIAL

## 2019-01-30 ENCOUNTER — HOSPITAL ENCOUNTER (OUTPATIENT)
Dept: CT IMAGING | Facility: CLINIC | Age: 54
Discharge: HOME | End: 2019-01-30
Attending: FAMILY MEDICINE | Admitting: FAMILY MEDICINE
Payer: COMMERCIAL

## 2019-01-30 VITALS
SYSTOLIC BLOOD PRESSURE: 111 MMHG | WEIGHT: 178.6 LBS | OXYGEN SATURATION: 100 % | HEIGHT: 64 IN | TEMPERATURE: 97.7 F | HEART RATE: 73 BPM | DIASTOLIC BLOOD PRESSURE: 70 MMHG | BODY MASS INDEX: 30.49 KG/M2 | RESPIRATION RATE: 16 BRPM

## 2019-01-30 DIAGNOSIS — R10.32 LLQ ABDOMINAL PAIN: Primary | ICD-10-CM

## 2019-01-30 DIAGNOSIS — R10.32 LLQ ABDOMINAL PAIN: ICD-10-CM

## 2019-01-30 DIAGNOSIS — Z12.11 SCREENING FOR COLON CANCER: ICD-10-CM

## 2019-01-30 PROCEDURE — 99213 OFFICE O/P EST LOW 20 MIN: CPT | Performed by: FAMILY MEDICINE

## 2019-01-30 PROCEDURE — 25000125 ZZHC RX 250: Performed by: FAMILY MEDICINE

## 2019-01-30 PROCEDURE — 74177 CT ABD & PELVIS W/CONTRAST: CPT

## 2019-01-30 PROCEDURE — 25000128 H RX IP 250 OP 636: Performed by: FAMILY MEDICINE

## 2019-01-30 RX ORDER — IOPAMIDOL 755 MG/ML
88 INJECTION, SOLUTION INTRAVASCULAR ONCE
Status: COMPLETED | OUTPATIENT
Start: 2019-01-30 | End: 2019-01-30

## 2019-01-30 RX ADMIN — IOPAMIDOL 88 ML: 755 INJECTION, SOLUTION INTRAVENOUS at 15:52

## 2019-01-30 RX ADMIN — SODIUM CHLORIDE 62 ML: 9 INJECTION, SOLUTION INTRAVENOUS at 15:52

## 2019-01-30 ASSESSMENT — MIFFLIN-ST. JEOR: SCORE: 1410.12

## 2019-01-31 NOTE — RESULT ENCOUNTER NOTE
Please call the patient with the results. Notify that CT look overall ok. Incidental gallstones noted but those would give RUQ pain. If at any point in the future she develops  RUQ pain consider re-evaluation. LLQ bowel looks ok. No diverticulitis. There are two small ovarian cysts noted but by size would be unlikely to be causing symptoms.  Next step would be colonoscopy - in part because she's also due for routine screening. Order placed please help her schedule this.

## 2019-02-01 ENCOUNTER — TELEPHONE (OUTPATIENT)
Dept: FAMILY MEDICINE | Facility: CLINIC | Age: 54
End: 2019-02-01

## 2019-02-01 DIAGNOSIS — Z12.11 SPECIAL SCREENING FOR MALIGNANT NEOPLASMS, COLON: Primary | ICD-10-CM

## 2019-02-01 NOTE — TELEPHONE ENCOUNTER
Discussed with the patient her results.  Patient agrees and understands.  Patient was mailed the prep.  The order was placed.    Gracia DEAL RN

## 2019-02-01 NOTE — TELEPHONE ENCOUNTER
Reason for Call:  Request for results:    Name of test or procedure: ct abdomen pelvis    Date of test of procedure: 1/30    Location of the test or procedure: [place]     OK to leave the result message on voice mail or with a family member? YES    Phone number Patient can be reached at:  Home number on file [phone]    Additional comments: pt is asking for a callback to discuss results and also pt is requesting we fax results to [facility] [phone] [facility] Faxed     Call taken on 2/1/2019 at 10:49 AM by Tarsha Velazquez

## 2019-05-08 ENCOUNTER — HOSPITAL ENCOUNTER (OUTPATIENT)
Dept: MAMMOGRAPHY | Facility: CLINIC | Age: 54
Discharge: HOME | End: 2019-05-08
Attending: FAMILY MEDICINE | Admitting: FAMILY MEDICINE
Payer: COMMERCIAL

## 2019-05-08 DIAGNOSIS — Z12.31 VISIT FOR SCREENING MAMMOGRAM: ICD-10-CM

## 2019-05-08 PROCEDURE — 77067 SCR MAMMO BI INCL CAD: CPT

## 2019-06-27 ENCOUNTER — TELEPHONE (OUTPATIENT)
Dept: FAMILY MEDICINE | Facility: CLINIC | Age: 54
End: 2019-06-27

## 2019-06-27 DIAGNOSIS — Z12.11 SPECIAL SCREENING FOR MALIGNANT NEOPLASMS, COLON: Primary | ICD-10-CM

## 2019-06-27 NOTE — TELEPHONE ENCOUNTER
Panel Management Review      Patient has the following on her problem list: None      Composite cancer screening  Chart review shows that this patient is due/due soon for the following Pap Smear, Colonoscopy or  Fecal Colorectal (FIT)  Summary:    Patient is due/failing the following:   COLONOSCOPY, FIT and PHYSICAL    Action needed:   Patient needs office visit for physical. and colonoscopy or FIT    Type of outreach:    Pt was called and reminded. Colonoscopy info sent to patient.    Questions for provider review:    None                                                                                                                                    Shelbie Alamo MA

## 2019-08-08 ENCOUNTER — OFFICE VISIT (OUTPATIENT)
Dept: FAMILY MEDICINE | Facility: CLINIC | Age: 54
End: 2019-08-08
Payer: COMMERCIAL

## 2019-08-08 VITALS
RESPIRATION RATE: 16 BRPM | TEMPERATURE: 97.6 F | OXYGEN SATURATION: 99 % | DIASTOLIC BLOOD PRESSURE: 64 MMHG | HEART RATE: 66 BPM | SYSTOLIC BLOOD PRESSURE: 100 MMHG | HEIGHT: 64 IN | BODY MASS INDEX: 30.9 KG/M2 | WEIGHT: 181 LBS

## 2019-08-08 DIAGNOSIS — Z13.228 ENCOUNTER FOR SCREENING FOR METABOLIC DISORDER: ICD-10-CM

## 2019-08-08 DIAGNOSIS — Z11.51 SPECIAL SCREENING EXAMINATION FOR HUMAN PAPILLOMAVIRUS (HPV): ICD-10-CM

## 2019-08-08 DIAGNOSIS — Z00.00 ROUTINE GENERAL MEDICAL EXAMINATION AT A HEALTH CARE FACILITY: Primary | ICD-10-CM

## 2019-08-08 DIAGNOSIS — Z12.11 SCREENING FOR COLON CANCER: ICD-10-CM

## 2019-08-08 DIAGNOSIS — Z12.4 SCREENING FOR MALIGNANT NEOPLASM OF CERVIX: ICD-10-CM

## 2019-08-08 DIAGNOSIS — Z13.220 LIPID SCREENING: ICD-10-CM

## 2019-08-08 LAB
ANION GAP SERPL CALCULATED.3IONS-SCNC: 6 MMOL/L (ref 3–14)
BUN SERPL-MCNC: 15 MG/DL (ref 7–30)
CALCIUM SERPL-MCNC: 8.7 MG/DL (ref 8.5–10.1)
CHLORIDE SERPL-SCNC: 108 MMOL/L (ref 94–109)
CHOLEST SERPL-MCNC: 213 MG/DL
CO2 SERPL-SCNC: 27 MMOL/L (ref 20–32)
CREAT SERPL-MCNC: 0.92 MG/DL (ref 0.52–1.04)
GFR SERPL CREATININE-BSD FRML MDRD: 72 ML/MIN/{1.73_M2}
GLUCOSE SERPL-MCNC: 99 MG/DL (ref 70–99)
HDLC SERPL-MCNC: 78 MG/DL
LDLC SERPL CALC-MCNC: 113 MG/DL
NONHDLC SERPL-MCNC: 135 MG/DL
POTASSIUM SERPL-SCNC: 3.7 MMOL/L (ref 3.4–5.3)
SODIUM SERPL-SCNC: 141 MMOL/L (ref 133–144)
TRIGL SERPL-MCNC: 110 MG/DL

## 2019-08-08 PROCEDURE — 99396 PREV VISIT EST AGE 40-64: CPT | Performed by: FAMILY MEDICINE

## 2019-08-08 PROCEDURE — 87624 HPV HI-RISK TYP POOLED RSLT: CPT | Performed by: FAMILY MEDICINE

## 2019-08-08 PROCEDURE — 80048 BASIC METABOLIC PNL TOTAL CA: CPT | Performed by: FAMILY MEDICINE

## 2019-08-08 PROCEDURE — 36415 COLL VENOUS BLD VENIPUNCTURE: CPT | Performed by: FAMILY MEDICINE

## 2019-08-08 PROCEDURE — G0145 SCR C/V CYTO,THINLAYER,RESCR: HCPCS | Performed by: FAMILY MEDICINE

## 2019-08-08 PROCEDURE — 80061 LIPID PANEL: CPT | Performed by: FAMILY MEDICINE

## 2019-08-08 ASSESSMENT — PAIN SCALES - GENERAL: PAINLEVEL: NO PAIN (0)

## 2019-08-08 ASSESSMENT — MIFFLIN-ST. JEOR: SCORE: 1413.63

## 2019-08-08 NOTE — PROGRESS NOTES
SUBJECTIVE:   CC: Dot Kenny is an 54 year old woman who presents for preventive health visit.     Healthy Habits:    Do you get at least three servings of calcium containing foods daily (dairy, green leafy vegetables, etc.)? yes    Amount of exercise or daily activities, outside of work: 3 day(s) per week    Problems taking medications regularly not applicable    Medication side effects: No    Have you had an eye exam in the past two years? yes    Do you see a dentist twice per year? yes    Do you have sleep apnea, excessive snoring or daytime drowsiness?no  [facility] [place] [place] [facility]    Chief Complaint   Patient presents with     Physical            Today's PHQ-2 Score:   PHQ-2 ( 1999 [employer]) 6/6/2018 6/1/2018   Q1: Little interest or pleasure in doing things 0 0   Q2: Feeling down, depressed or hopeless 0 0   PHQ-2 Score 0 0       Abuse: Current or Past(Physical, Sexual or Emotional)- No  Do you feel safe in your environment? Yes    Social History     Tobacco Use     Smoking status: Never Smoker     Smokeless tobacco: Never Used   Substance Use Topics     Alcohol use: No     If you drink alcohol do you typically have >3 drinks per day or >7 drinks per week? Not Applicable                     Reviewed orders with patient.  Reviewed health maintenance and updated orders accordingly - Yes  Labs reviewed in EPIC  Patient Active Problem List   Diagnosis     CARDIOVASCULAR SCREENING; LDL GOAL LESS THAN 160     Dysmenorrhea     ASCUS favor benign     Past Surgical History:   Procedure Laterality Date     BUNIONECTOMY Right 6/14/2016    Procedure: BUNIONECTOMY;  Surgeon: Jean Carlos Coleman DPM;  Location: [facility] APPENDECTOMY  2000    appendectomy      REMOVE HARDWARE FOOT Right 6/5/2018    Procedure: REMOVE HARDWARE FOOT;  Removal of Hardware Right Foot;  Surgeon: Jean Carlos Coleman DPM;  Location: [place] OR       Social History     Tobacco Use     Smoking status: Never Smoker     Smokeless tobacco: Never Used    Substance Use Topics     Alcohol use: No     Family History   Problem Relation Age of Onset     Gastrointestinal Disease Mother         Diverticulitis     Hypertension Father      Thyroid Disease Paternal Grandfather      Diabetes No family hx of      Breast Cancer No family hx of      Cancer - colorectal No family hx of      Alcohol/Drug No family hx of      Lipids No family hx of          Current Outpatient Medications   Medication Sig Dispense Refill     Acetaminophen (TYLENOL) 325 MG CAPS Take 325-650 mg by mouth every 4 hours as needed       NO ACTIVE MEDICATIONS .       Allergies   Allergen Reactions     Cefzil [Cefprozil]      rash     Sulfa Drugs      Rash         Mammogram Screening: Patient over age 50, mutual decision to screen reflected in health maintenance.    Pertinent mammograms are reviewed under the imaging tab.  History of abnormal Pap smear:   NO - age 30-65 PAP every 5 years with negative HPV co-testing recommended  Last 3 Pap and HPV Results:   PAP / HPV Latest Ref Rng & Units 3/17/2016 8/1/2013 1/19/2010   PAP - ASC-US(A) OTHER-NIL, See Result NIL   HPV 16 DNA NEG Negative - -   HPV 18 DNA NEG Negative - -   OTHER HR HPV NEG Negative - -     PAP / HPV Latest Ref Rng & Units 3/17/2016 8/1/2013 1/19/2010   PAP - ASC-US(A) OTHER-NIL, See Result NIL   HPV 16 DNA NEG Negative - -   HPV 18 DNA NEG Negative - -   OTHER HR HPV NEG Negative - -     Reviewed and updated as needed this visit by clinical staff  Tobacco  Allergies  Meds  Med Hx  Surg Hx  Fam Hx  Soc Hx        Reviewed and updated as needed this visit by Provider        Past Medical History:   Diagnosis Date     ASCUS favor benign 3/2016    Neg HPV      Past Surgical History:   Procedure Laterality Date     BUNIONECTOMY Right 6/14/2016    Procedure: BUNIONECTOMY;  Surgeon: Jean Carlos Coleman DPM;  Location: [place] OR     C APPENDECTOMY  2000    appendectomy      REMOVE HARDWARE FOOT Right 6/5/2018    Procedure: REMOVE HARDWARE FOOT;   "Removal of Hardware Right Foot;  Surgeon: Jean Carlos Coleman DPM;  Location: [place] OR       ROS:  Constitutional, neuro, ENT, endocrine, pulmonary, cardiac, gastrointestinal, genitourinary, musculoskeletal, integument and psychiatric systems are negative, except as otherwise noted.     OBJECTIVE:   /64 (BP Location: Right arm, Patient Position: Sitting, Cuff Size: Adult Large)   Pulse 66   Temp 97.6  F (36.4  C) (Tympanic)   Resp 16   Ht 1.622 m (5' 3.85\")   Wt 82.1 kg (181 lb)   LMP 05/20/2012 (Approximate)   SpO2 99%   Breastfeeding? No   BMI 31.21 kg/m    EXAM:  GENERAL: healthy, alert and no distress  EYES: Eyes grossly normal to inspection, PERRL and conjunctivae and sclerae normal  HENT: ear canals and TM's normal, nose and mouth without ulcers or lesions  NECK: no adenopathy, no asymmetry, masses, or scars and thyroid normal to palpation  RESP: lungs clear to auscultation - no rales, rhonchi or wheezes  BREAST: normal without masses, tenderness or nipple discharge and no palpable axillary masses or adenopathy  CV: regular rate and rhythm, normal S1 S2, no S3 or S4, no murmur, click or rub, no peripheral edema and peripheral pulses strong  ABDOMEN: soft, nontender, no hepatosplenomegaly, no masses and bowel sounds normal   (female): normal female external genitalia, normal urethral meatus, vaginal mucosa pink, moist, well rugated, and normal cervix/adnexa/uterus without masses or discharge  MS: no gross musculoskeletal defects noted, no edema  SKIN: no suspicious lesions or rashes  NEURO: Normal strength and tone, mentation intact and speech normal  PSYCH: mentation appears normal, affect normal/bright    ASSESSMENT/PLAN:   1. Routine general medical examination at a health care facility    2. Special screening examination for human papillomavirus (HPV)  - HPV High Risk Types DNA Cervical    3. Screening for malignant neoplasm of cervix  - PAP imaged thin layer screen    4. Lipid screening  - " "Lipid panel reflex to direct LDL Fasting    5. Encounter for screening for metabolic disorder  - Basic metabolic panel    6. Screening for colon cancer  - GASTROENTEROLOGY ADULT REF PROCEDURE ONLY    COUNSELING:   Reviewed preventive health counseling, as reflected in patient instructions    Estimated body mass index is 31.21 kg/m  as calculated from the following:    Height as of this encounter: 1.622 m (5' 3.85\").    Weight as of this encounter: 82.1 kg (181 lb).    Weight management plan: Discussed healthy diet and exercise guidelines     reports that she has never smoked. She has never used smokeless tobacco.      Counseling Resources:  ATP IV Guidelines  Pooled Cohorts Equation Calculator  Breast Cancer Risk Calculator  FRAX Risk Assessment  ICSI Preventive Guidelines  Dietary Guidelines for Americans, 2010  USDA's MyPlate  ASA Prophylaxis  Lung CA Screening    Mayito Dumont MD  [facility]  "

## 2019-08-08 NOTE — RESULT ENCOUNTER NOTE
Notified via [employer]: Cholesterol mildly elevated.  Not abnormal enough at this point to warrant medication changes but I would recommend: increasing daily exercise, increasing dietary fiber, eliminating trans fats and reducing saturated fats. Otherwise labs look good.

## 2019-08-08 NOTE — PATIENT INSTRUCTIONS
Remifen and Estroven: black cohosh, soy, ginko, ginseng etc.    Shingrix shingles vaccine    Preventive Health Recommendations  Female Ages 50 - 64    Yearly exam: See your health care provider every year in order to  o Review health changes.   o Discuss preventive care.    o Review your medicines if your doctor has prescribed any.      Get a Pap test every three years (unless you have an abnormal result and your provider advises testing more often).    If you get Pap tests with HPV test, you only need to test every 5 years, unless you have an abnormal result.     You do not need a Pap test if your uterus was removed (hysterectomy) and you have not had cancer.    You should be tested each year for STDs (sexually transmitted diseases) if you're at risk.     Have a mammogram every 1 to 2 years.    Have a colonoscopy at age 50, or have a yearly FIT test (stool test). These exams screen for colon cancer.      Have a cholesterol test every 5 years, or more often if advised.    Have a diabetes test (fasting glucose) every three years. If you are at risk for diabetes, you should have this test more often.     If you are at risk for osteoporosis (brittle bone disease), think about having a bone density scan (DEXA).    Shots: Get a flu shot each year. Get a tetanus shot every 10 years.    Nutrition:     Eat at least 5 servings of fruits and vegetables each day.    Eat whole-grain bread, whole-wheat pasta and brown rice instead of white grains and rice.    Get adequate Calcium and Vitamin D.     Lifestyle    Exercise at least 150 minutes a week (30 minutes a day, 5 days a week). This will help you control your weight and prevent disease.    Limit alcohol to one drink per day.    No smoking.     Wear sunscreen to prevent skin cancer.     See your dentist every six months for an exam and cleaning.    See your eye doctor every 1 to 2 years.

## 2019-08-12 LAB
COPATH REPORT: NORMAL
PAP: NORMAL

## 2019-08-13 LAB
FINAL DIAGNOSIS: NORMAL
HPV HR 12 DNA CVX QL NAA+PROBE: NEGATIVE
HPV16 DNA SPEC QL NAA+PROBE: NEGATIVE
HPV18 DNA SPEC QL NAA+PROBE: NEGATIVE
SPECIMEN DESCRIPTION: NORMAL
SPECIMEN SOURCE CVX/VAG CYTO: NORMAL

## 2019-12-12 ENCOUNTER — TELEPHONE (OUTPATIENT)
Dept: FAMILY MEDICINE | Facility: CLINIC | Age: 54
End: 2019-12-12

## 2019-12-12 DIAGNOSIS — Z12.11 SPECIAL SCREENING FOR MALIGNANT NEOPLASMS, COLON: Primary | ICD-10-CM

## 2019-12-12 NOTE — TELEPHONE ENCOUNTER
Panel Management Review      Patient has the following on her problem list: None      Composite cancer screening  Chart review shows that this patient is due/due soon for the following Colonoscopy  Summary:    Patient is due/failing the following:   COLONOSCOPY    Action needed:   Pt to schedule colonoscopy.     Type of outreach:    Phone, spoke to patient.  states she will call and schedule her colonoscopy    Questions for provider review:    None                                                                                                                                    Shelbie Alamo MA

## 2020-02-28 ENCOUNTER — HOSPITAL ENCOUNTER (OUTPATIENT)
Facility: CLINIC | Age: 55
End: 2020-02-28
Attending: SURGERY | Admitting: SURGERY
Payer: COMMERCIAL

## 2020-05-28 DIAGNOSIS — Z11.59 ENCOUNTER FOR SCREENING FOR OTHER VIRAL DISEASES: Primary | ICD-10-CM

## 2020-06-07 ENCOUNTER — ANESTHESIA EVENT (OUTPATIENT)
Dept: GASTROENTEROLOGY | Facility: CLINIC | Age: 55
End: 2020-06-07
Payer: COMMERCIAL

## 2020-06-07 NOTE — ANESTHESIA PREPROCEDURE EVALUATION
Anesthesia Pre-Procedure Evaluation    Patient: Dot Kenny   MRN: [id number] : 1965          Preoperative Diagnosis: Special screening for malignant neoplasm of colon [Z12.11]    Procedure(s):  COLONOSCOPY    Past Medical History:   Diagnosis Date     ASCUS of cervix with negative high risk HPV 2016    Neg HPV     Past Surgical History:   Procedure Laterality Date     BUNIONECTOMY Right 2016    Procedure: BUNIONECTOMY;  Surgeon: Jean Carlos Coleman DPM;  Location: [facility]     C APPENDECTOMY  2000    appendectomy      REMOVE HARDWARE FOOT Right 2018    Procedure: REMOVE HARDWARE FOOT;  Removal of Hardware Right Foot;  Surgeon: Jean Carlos Coleman DPM;  Location: [facility] OR       Anesthesia Evaluation     .             ROS/MED HX    ENT/Pulmonary:       Neurologic:       Cardiovascular:     (+) Dyslipidemia, ----. : . . . :. .       METS/Exercise Tolerance:     Hematologic:         Musculoskeletal:         GI/Hepatic:         Renal/Genitourinary:         Endo:     (+) Obesity, .      Psychiatric:         Infectious Disease:         Malignancy:         Other:                          Physical Exam  Normal systems: cardiovascular, pulmonary and dental    Airway   Mallampati: I  TM distance: >3 FB  Neck ROM: full    Dental     Cardiovascular   Rhythm and rate: regular and normal      Pulmonary    breath sounds clear to auscultation            Lab Results   Component Value Date    WBC 5.6 2018    HGB 12.2 2018    HCT 36.9 2018     2018     2019    POTASSIUM 3.7 2019    CHLORIDE 108 2019    CO2 27 2019    BUN 15 2019    CR 0.92 2019    GLC 99 2019    DAMARI 8.7 2019    ALBUMIN 3.6 2018    PROTTOTAL 7.0 2018    ALT 18 2018    AST 11 2018    ALKPHOS 52 2018    BILITOTAL 0.5 2018    LIPASE 182 2018    TSH 4.67 (H) 2018    T4 0.92 2018    HCG Negative 2018       Preop  "Vitals  BP Readings from Last 3 Encounters:   08/08/19 100/64   01/30/19 111/70   07/17/18 100/52    Pulse Readings from Last 3 Encounters:   08/08/19 66   01/30/19 73   07/17/18 68      Resp Readings from Last 3 Encounters:   08/08/19 16   01/30/19 16   07/17/18 20    SpO2 Readings from Last 3 Encounters:   08/08/19 99%   01/30/19 100%   06/07/18 99%      Temp Readings from Last 1 Encounters:   08/08/19 36.4  C (97.6  F) (Tympanic)    Ht Readings from Last 1 Encounters:   08/08/19 1.622 m (5' 3.85\")      Wt Readings from Last 1 Encounters:   08/08/19 82.1 kg (181 lb)    Estimated body mass index is 31.21 kg/m  as calculated from the following:    Height as of 8/8/19: 1.622 m (5' 3.85\").    Weight as of 8/8/19: 82.1 kg (181 lb).       Anesthesia Plan      History & Physical Review  History and physical reviewed and following examination; no interval change.    ASA Status:  2 .    NPO Status:  > 6 hours    Plan for MAC Reason for MAC:  Deep or markedly invasive procedure (G8)           Postoperative Care      Consents  Anesthetic plan, risks, benefits and alternatives discussed with:  Patient..                 DENAE Ceballos CRNA  "

## 2020-06-08 DIAGNOSIS — Z11.59 ENCOUNTER FOR SCREENING FOR OTHER VIRAL DISEASES: ICD-10-CM

## 2020-06-08 PROCEDURE — 99000 SPECIMEN HANDLING OFFICE-LAB: CPT | Performed by: SURGERY

## 2020-06-08 PROCEDURE — U0003 INFECTIOUS AGENT DETECTION BY NUCLEIC ACID (DNA OR RNA); SEVERE ACUTE RESPIRATORY SYNDROME CORONAVIRUS 2 (SARS-COV-2) (CORONAVIRUS DISEASE [COVID-19]), AMPLIFIED PROBE TECHNIQUE, MAKING USE OF HIGH THROUGHPUT TECHNOLOGIES AS DESCRIBED BY CMS-2020-01-R: HCPCS | Mod: 90 | Performed by: SURGERY

## 2020-06-08 PROCEDURE — 99207 ZZC NO CHARGE NURSE ONLY: CPT

## 2020-06-09 LAB
SARS-COV-2 RNA SPEC QL NAA+PROBE: NOT DETECTED
SPECIMEN SOURCE: NORMAL

## 2020-06-11 ENCOUNTER — HOSPITAL ENCOUNTER (OUTPATIENT)
Facility: CLINIC | Age: 55
Discharge: HOME | End: 2020-06-11
Attending: SURGERY | Admitting: SURGERY
Payer: COMMERCIAL

## 2020-06-11 ENCOUNTER — ANESTHESIA (OUTPATIENT)
Dept: GASTROENTEROLOGY | Facility: CLINIC | Age: 55
End: 2020-06-11
Payer: COMMERCIAL

## 2020-06-11 VITALS
BODY MASS INDEX: 27.31 KG/M2 | HEIGHT: 64 IN | WEIGHT: 160 LBS | DIASTOLIC BLOOD PRESSURE: 72 MMHG | HEART RATE: 72 BPM | OXYGEN SATURATION: 96 % | TEMPERATURE: 97.5 F | SYSTOLIC BLOOD PRESSURE: 116 MMHG | RESPIRATION RATE: 20 BRPM

## 2020-06-11 LAB
COLONOSCOPY: NORMAL
HCG SERPL QL: NEGATIVE

## 2020-06-11 PROCEDURE — 25800030 ZZH RX IP 258 OP 636: Performed by: SURGERY

## 2020-06-11 PROCEDURE — 45385 COLONOSCOPY W/LESION REMOVAL: CPT | Mod: PT | Performed by: SURGERY

## 2020-06-11 PROCEDURE — 37000008 ZZH ANESTHESIA TECHNICAL FEE, 1ST 30 MIN: Performed by: SURGERY

## 2020-06-11 PROCEDURE — 45385 COLONOSCOPY W/LESION REMOVAL: CPT | Performed by: SURGERY

## 2020-06-11 PROCEDURE — 88305 TISSUE EXAM BY PATHOLOGIST: CPT | Mod: 26 | Performed by: SURGERY

## 2020-06-11 PROCEDURE — 25000125 ZZHC RX 250: Performed by: SURGERY

## 2020-06-11 PROCEDURE — 88305 TISSUE EXAM BY PATHOLOGIST: CPT | Performed by: SURGERY

## 2020-06-11 PROCEDURE — 84703 CHORIONIC GONADOTROPIN ASSAY: CPT | Performed by: SURGERY

## 2020-06-11 PROCEDURE — 25000128 H RX IP 250 OP 636: Performed by: NURSE ANESTHETIST, CERTIFIED REGISTERED

## 2020-06-11 RX ORDER — SODIUM CHLORIDE, SODIUM LACTATE, POTASSIUM CHLORIDE, CALCIUM CHLORIDE 600; 310; 30; 20 MG/100ML; MG/100ML; MG/100ML; MG/100ML
INJECTION, SOLUTION INTRAVENOUS CONTINUOUS
Status: DISCONTINUED | OUTPATIENT
Start: 2020-06-11 | End: 2020-06-11 | Stop reason: HOSPADM

## 2020-06-11 RX ORDER — PROPOFOL 10 MG/ML
INJECTION, EMULSION INTRAVENOUS PRN
Status: DISCONTINUED | OUTPATIENT
Start: 2020-06-11 | End: 2020-06-11

## 2020-06-11 RX ORDER — ONDANSETRON 2 MG/ML
4 INJECTION INTRAMUSCULAR; INTRAVENOUS
Status: DISCONTINUED | OUTPATIENT
Start: 2020-06-11 | End: 2020-06-11 | Stop reason: HOSPADM

## 2020-06-11 RX ORDER — LIDOCAINE 40 MG/G
CREAM TOPICAL
Status: DISCONTINUED | OUTPATIENT
Start: 2020-06-11 | End: 2020-06-11 | Stop reason: HOSPADM

## 2020-06-11 RX ADMIN — SODIUM CHLORIDE, POTASSIUM CHLORIDE, SODIUM LACTATE AND CALCIUM CHLORIDE: 600; 310; 30; 20 INJECTION, SOLUTION INTRAVENOUS at 07:42

## 2020-06-11 RX ADMIN — LIDOCAINE HYDROCHLORIDE 0.1 ML: 10 INJECTION, SOLUTION EPIDURAL; INFILTRATION; INTRACAUDAL; PERINEURAL at 07:42

## 2020-06-11 RX ADMIN — PROPOFOL 150 MG: 10 INJECTION, EMULSION INTRAVENOUS at 08:06

## 2020-06-11 RX ADMIN — PROPOFOL 150 MG: 10 INJECTION, EMULSION INTRAVENOUS at 08:08

## 2020-06-11 RX ADMIN — PROPOFOL 100 MG: 10 INJECTION, EMULSION INTRAVENOUS at 08:12

## 2020-06-11 RX ADMIN — PROPOFOL 100 MG: 10 INJECTION, EMULSION INTRAVENOUS at 08:10

## 2020-06-11 ASSESSMENT — MIFFLIN-ST. JEOR: SCORE: 1313.38

## 2020-06-11 NOTE — ANESTHESIA CARE TRANSFER NOTE
Patient: Dot Kenny    Procedure(s):  COLONOSCOPY, FLEXIBLE, WITH LESION REMOVAL USING SNARE    Diagnosis: Special screening for malignant neoplasm of colon [Z12.11]  Diagnosis Additional Information: No value filed.    Anesthesia Type:   No value filed.     Note:  Airway :Nasal Cannula  Patient transferred to:Phase II  Handoff Report: Identifed the Patient, Identified the Reponsible Provider, Reviewed the pertinent medical history, Discussed the surgical course, Reviewed Intra-OP anesthesia mangement and issues during anesthesia, Set expectations for post-procedure period and Allowed opportunity for questions and acknowledgement of understanding      Vitals: (Last set prior to Anesthesia Care Transfer)    CRNA VITALS  6/11/2020 0746 - 6/11/2020 0816      6/11/2020             Pulse:  77    SpO2:  96 %                Electronically Signed By: DENAE Tam CRNA  June 11, 2020  8:16 AM  
Samples Given: Cloderm cream- apply to affected area to reduce inflammation
Detail Level: Zone
Plan: Patient is to take prednisone as prescribed and use the cloderm as needed. Patient also has Silver Diazine cream and she can continue use of that.
Otc Regimen: Zyrtec or Allegra for 2 weeks  . Allegra at night due to drowsiness

## 2020-06-11 NOTE — ANESTHESIA POSTPROCEDURE EVALUATION
Patient: Dot Kenny    Procedure(s):  COLONOSCOPY, FLEXIBLE, WITH LESION REMOVAL USING SNARE    Diagnosis:Special screening for malignant neoplasm of colon [Z12.11]  Diagnosis Additional Information: No value filed.    Anesthesia Type:  No value filed.    Note:  Anesthesia Post Evaluation    Patient location during evaluation: Bedside  Patient participation: Able to fully participate in evaluation  Level of consciousness: awake and alert  Pain management: adequate  multimodal analgesia used between 6 hours prior to anesthesia start to PACU dischargeAirway patency: patent  Cardiovascular status: acceptable  Respiratory status: acceptable  two or more mitigation strategies used for obstructive sleep apneaHydration status: acceptable     Anesthetic complications: None          Last vitals:  Vitals:    06/11/20 0707   BP: 110/70   Pulse: 78   Resp: 16   Temp: 36.4  C (97.5  F)   SpO2: 98%         Electronically Signed By: DENAE Tam CRNA  June 11, 2020  8:17 AM

## 2020-06-11 NOTE — H&P
"55 year old year old female here for colonoscopy for screening.    Patient Active Problem List   Diagnosis     CARDIOVASCULAR SCREENING; LDL GOAL LESS THAN 160     Dysmenorrhea       Past Medical History:   Diagnosis Date     ASCUS of cervix with negative high risk HPV 03/2016    Neg HPV       Past Surgical History:   Procedure Laterality Date     BUNIONECTOMY Right 6/14/2016    Procedure: BUNIONECTOMY;  Surgeon: Jean Carlos Coleman DPM;  Location: [facility] APPENDECTOMY  2000    appendectomy      REMOVE HARDWARE FOOT Right 6/5/2018    Procedure: REMOVE HARDWARE FOOT;  Removal of Hardware Right Foot;  Surgeon: Jean Carlos Coleman DPM;  Location: [facility]       @[facility]@     current outpatient medications on file.       Allergies   Allergen Reactions     Cefzil [Cefprozil]      rash     Sulfa Drugs      Rash         Pt reports that she has never smoked. She has never used smokeless tobacco. She reports that she does not drink alcohol or use drugs.    Exam:  /70 (BP Location: Right arm)   Pulse 78   Temp 97.5  F (36.4  C) (Oral)   Resp 16   Ht 1.622 m (5' 3.85\")   Wt 72.6 kg (160 lb)   LMP  (Within Years)   SpO2 98%   BMI 27.59 kg/m      Awake, Alert OX3  Lungs - CTA bilaterally  CV - RRR, no murmurs, distal pulses intact  Abd - soft, non-distended, non-tender, +BS  Extr - No cyanosis or edema    A/P 55 year old year old female in need of colonoscopy for screening. Risks, benefits, alternatives, and complications were discussed including the possibility of perforation and the patient agreed to proceed    Gianluca Joyner MD     "

## 2020-06-12 LAB — COPATH REPORT: NORMAL

## 2020-06-18 ENCOUNTER — HOSPITAL ENCOUNTER (OUTPATIENT)
Dept: MAMMOGRAPHY | Facility: CLINIC | Age: 55
Discharge: HOME | End: 2020-06-18
Attending: FAMILY MEDICINE | Admitting: FAMILY MEDICINE
Payer: COMMERCIAL

## 2020-06-18 DIAGNOSIS — Z12.31 SCREENING MAMMOGRAM FOR HIGH-RISK PATIENT: ICD-10-CM

## 2020-06-18 PROCEDURE — 77067 SCR MAMMO BI INCL CAD: CPT

## 2020-08-25 ENCOUNTER — VIRTUAL VISIT (OUTPATIENT)
Dept: URGENT CARE | Facility: CLINIC | Age: 55
End: 2020-08-25
Payer: COMMERCIAL

## 2020-08-25 DIAGNOSIS — N30.00 ACUTE CYSTITIS WITHOUT HEMATURIA: Primary | ICD-10-CM

## 2020-08-25 PROCEDURE — 99213 OFFICE O/P EST LOW 20 MIN: CPT | Mod: TEL | Performed by: INTERNAL MEDICINE

## 2020-08-25 RX ORDER — NITROFURANTOIN 25; 75 MG/1; MG/1
100 CAPSULE ORAL 2 TIMES DAILY
Qty: 10 CAPSULE | Refills: 0 | Status: SHIPPED | OUTPATIENT
Start: 2020-08-25 | End: 2020-08-30

## 2020-08-25 NOTE — PROGRESS NOTES
"  Dot Kenny is a 55 year old female who is being evaluated via a billable telephone visit.      The patient has been notified of following:     \"This telephone visit will be conducted via a call between you and your physician/provider. We have found that certain health care needs can be provided without the need for a physical exam.  This service lets us provide the care you need with a short phone conversation.  If a prescription is necessary we can send it directly to your pharmacy.  If lab work is needed we can place an order for that and you can then stop by our lab to have the test done at a later time.    If during the course of the call the physician/provider feels a telephone visit is not appropriate, you will not be charged for this service.\"     Patient has given verbal consent for Telephone visit?  Yes    SUBJECTIVE:  Dot Kenny is an 55 year old female who presents for discomfort in bladder area.  Feels achy in bladder area for about a week, occasionally sharp pain.  When urinates has more pain.  Urinating more often frequently and a little urgency.  No h/o bladder infection.  No vaginal itching.  A little vaginal discharge which is fairly baseline for her.  Not seem like vaginal infection to her.  If pushes on suprapubic area is a little tender.  No changes in BM.  No vomiting.  No fevers, chills, sweats.  Urine looks a little more yellow than usual sometimes, no blood in urine.  Took some tylenol which helped pain a little.      PMH:   has a past medical history of ASCUS of cervix with negative high risk HPV (03/2016).  Patient Active Problem List   Diagnosis     CARDIOVASCULAR SCREENING; LDL GOAL LESS THAN 160     Dysmenorrhea     Social History     Socioeconomic History     Marital status:      Spouse name: Not on file     Number of children: Not on file     Years of education: Not on file     Highest education level: Not on file   Occupational History     Not on file   Social Needs     " Financial resource strain: Not on file     Food insecurity     Worry: Not on file     Inability: Not on file     Transportation needs     Medical: Not on file     Non-medical: Not on file   Tobacco Use     Smoking status: Never Smoker     Smokeless tobacco: Never Used   Substance and Sexual Activity     Alcohol use: No     Drug use: No     Sexual activity: Yes     Partners: Male     Comment:  vasectomy   Lifestyle     Physical activity     Days per week: Not on file     Minutes per session: Not on file     Stress: Not on file   Relationships     Social connections     Talks on phone: Not on file     Gets together: Not on file     Attends Gnosticism service: Not on file     Active member of club or organization: Not on file     Attends meetings of clubs or organizations: Not on file     Relationship status: Not on file     Intimate partner violence     Fear of current or ex partner: Not on file     Emotionally abused: Not on file     Physically abused: Not on file     Forced sexual activity: Not on file   Other Topics Concern     Parent/sibling w/ CABG, MI or angioplasty before 65F 55M? No      Service No     Blood Transfusions No     Caffeine Concern No     Occupational Exposure No     Hobby Hazards No     Sleep Concern No     Stress Concern No     Weight Concern No     Special Diet No     Back Care No     Exercise No     Bike Helmet Not Asked     Seat Belt Yes     Self-Exams Yes   Social History Narrative     Not on file     Family History   Problem Relation Age of Onset     Gastrointestinal Disease Mother         Diverticulitis     Hypertension Father      Thyroid Disease Paternal Grandfather      Diabetes No family hx of      Breast Cancer No family hx of      Cancer - colorectal No family hx of      Alcohol/Drug No family hx of      Lipids No family hx of        ALLERGIES:  Cefzil [cefprozil] and Sulfa drugs    Current Outpatient Medications   Medication     Acetaminophen (TYLENOL) 325 MG CAPS      NO ACTIVE MEDICATIONS     No current facility-administered medications for this visit.          ROS:  ROS is done and is negative for general/constitutional, eye, ENT, Respiratory, cardiovascular, GI, , Skin, musculoskeletal except as noted elsewhere.  All other review of systems negative except as noted elsewhere.      OBJECTIVE:  There were no vitals taken for this visit.  GENERAL : Alert and oriented.  Did not seem to be in distress.   RESP: No respiratory distress detected during phone conversation         ASSESSMENT/PLAN:    ASSESSMENT / PLAN:  (N30.00) Acute cystitis without hematuria  (primary encounter diagnosis)  Comment: sxs c/w likely uti.  Will tx with abx and have pt f/u if not improve, worsens, or recurs  Plan: nitroFURantoin macrocrystal-monohydrate         (MACROBID) 100 MG capsule        Reviewed medication instructions and side effects. Follow up if experiences side effects.. I reviewed supportive care, otc meds to use if needed, expected course, and signs of concern.  Follow up as needed or if she does not improve within 5 day(s) or if worsens in any way.  Reviewed red flag symptoms and is to go to the ER if experiences any of these.          See [facility] for orders, medications, letters, patient instructions    Amena Slater MD  8/25/2020, 5:18 PM      Phone call duration:  9 minutes

## 2021-07-19 ENCOUNTER — HOSPITAL ENCOUNTER (OUTPATIENT)
Dept: MAMMOGRAPHY | Facility: CLINIC | Age: 56
Discharge: HOME | End: 2021-07-19
Attending: FAMILY MEDICINE | Admitting: FAMILY MEDICINE
Payer: COMMERCIAL

## 2021-07-19 DIAGNOSIS — Z12.31 VISIT FOR SCREENING MAMMOGRAM: ICD-10-CM

## 2021-07-19 PROCEDURE — 77063 BREAST TOMOSYNTHESIS BI: CPT

## 2021-10-17 NOTE — PROGRESS NOTES
SUBJECTIVE:   Dot Kenny is a 53 year old female who presents to clinic today for the following health issues:    ABDOMINAL   PAIN     Onset: 5 months    Description:   Character: Sharp and shooting  Location: left lower quadrant  Radiation: Pelvic region    Intensity: severe    Progression of Symptoms:  worsening and intermittent    Accompanying Signs & Symptoms:  Fever/Chills?: no   Gas/Bloating: YES  Nausea: no   Vomitting: no   Diarrhea?: no   Constipation:no   Dysuria or Hematuria: no    History:   Trauma: no   Previous similar pain: YES- ovarian cysts   Previous tests done: ultrasound    Precipitating factors:   Does the pain change with:     Food: no      BM: no     Urination: no     Alleviating factors:  unknown    Therapies Tried and outcome: none    LMP:  not applicable     ADDITIONAL HPI: 53 year old female here for above issue.  Pain lasts 30-90 minutes.  Has had GYN work-up including pelvic U/S which was unremarkable. Also had UA which was normal.    ROS: 10 point review of systems negative except as per HPI.    PAST MEDICAL HISTORY:  Past Medical History:   Diagnosis Date     ASCUS favor benign 3/2016    Neg HPV        ACTIVE MEDICAL PROBLEMS:  Patient Active Problem List   Diagnosis     CARDIOVASCULAR SCREENING; LDL GOAL LESS THAN 160     Dysmenorrhea     ASCUS favor benign        FAMILY HISTORY:  Family History   Problem Relation Age of Onset     Gastrointestinal Disease Mother         Diverticulitis     Hypertension Father      Thyroid Disease Paternal Grandfather      Diabetes No family hx of      Breast Cancer No family hx of      Cancer - colorectal No family hx of      Alcohol/Drug No family hx of      Lipids No family hx of        SOCIAL HISTORY:  Social History     Socioeconomic History     Marital status:      Spouse name: Not on file     Number of children: Not on file     Years of education: Not on file     Highest education level: Not on file   Social Needs     Financial resource  "strain: Not on file     Food insecurity - worry: Not on file     Food insecurity - inability: Not on file     Transportation needs - medical: Not on file     Transportation needs - non-medical: Not on file   Occupational History     Not on file   Tobacco Use     Smoking status: Never Smoker     Smokeless tobacco: Never Used   Substance and Sexual Activity     Alcohol use: No     Drug use: No     Sexual activity: Yes     Partners: Male     Comment:  vasectomy   Other Topics Concern     Parent/sibling w/ CABG, MI or angioplasty before 65F 55M? No      Service No     Blood Transfusions No     Caffeine Concern No     Occupational Exposure No     Hobby Hazards No     Sleep Concern No     Stress Concern No     Weight Concern No     Special Diet No     Back Care No     Exercise No     Bike Helmet Not Asked     Seat Belt Yes     Self-Exams Yes   Social History Narrative     Not on file       MEDICATIONS:  Current Outpatient Medications   Medication Sig Dispense Refill     Acetaminophen (TYLENOL) 325 MG CAPS Take 325-650 mg by mouth every 4 hours as needed       NO ACTIVE MEDICATIONS .         ALLERGIES:     Allergies   Allergen Reactions     Cefzil [Cefprozil]      rash     Sulfa Drugs      Rash         Problem list, Medication list, Allergies, and Medical/Social/Surgical histories reviewed in [facility] and updated as appropriate.    OBJECTIVE:                                                    VITALS: /70   Pulse 73   Temp 97.7  F (36.5  C) (Tympanic)   Resp 16   Ht 1.626 m (5' 4\")   Wt 81 kg (178 lb 9.6 oz)   LMP 12/17/2018 (Approximate)   SpO2 100%   BMI 30.66 kg/m   Body mass index is 30.66 kg/m .  GENERAL: Pleasant, well appearing female.  ABDOMEN: Soft, nondistended, nontender, no hepatosplenomegaly. No palpable masses.     ASSESSMENT/PLAN:                                                    1. LLQ abdominal pain  Will start further work-up with abdomen CT. If normal then colonoscopy.  - CT " Abdomen Pelvis w Contrast; Future        111

## 2022-05-02 ENCOUNTER — OFFICE VISIT (OUTPATIENT)
Dept: OBGYN | Facility: CLINIC | Age: 57
End: 2022-05-02
Payer: COMMERCIAL

## 2022-05-02 VITALS
DIASTOLIC BLOOD PRESSURE: 69 MMHG | RESPIRATION RATE: 18 BRPM | WEIGHT: 154 LBS | SYSTOLIC BLOOD PRESSURE: 109 MMHG | TEMPERATURE: 97.3 F | HEIGHT: 64 IN | BODY MASS INDEX: 26.29 KG/M2 | HEART RATE: 62 BPM

## 2022-05-02 DIAGNOSIS — Z78.0 MENOPAUSE PRESENT: Primary | ICD-10-CM

## 2022-05-02 PROCEDURE — 99203 OFFICE O/P NEW LOW 30 MIN: CPT | Performed by: OBSTETRICS & GYNECOLOGY

## 2022-05-02 NOTE — NURSING NOTE
"Initial /69 (BP Location: Right arm, Patient Position: Chair, Cuff Size: Adult Regular)   Pulse 62   Temp 97.3  F (36.3  C) (Tympanic)   Resp 18   Ht 1.626 m (5' 4\")   Wt 69.9 kg (154 lb)   Breastfeeding No   BMI 26.43 kg/m   Estimated body mass index is 26.43 kg/m  as calculated from the following:    Height as of this encounter: 1.626 m (5' 4\").    Weight as of this encounter: 69.9 kg (154 lb). .      "

## 2022-05-02 NOTE — PROGRESS NOTES
Dot is a 56 year old   female who presents for advice regarding menopause; she states her LNMP was around 18 months ago; she is having episodes of hot flashes (since getting her Covid vaccine), not problematic, weekly night sweats (states she sleep hot, which is new) with relatively poor quality sleep.  She has bloating and central weight deposition, although has lost 40 lbs intentionally.  She has some moodiness, irritability; no vaginal dryness.  .    Patient Active Problem List    Diagnosis Date Noted     Dysmenorrhea 2015     Priority: Medium     CARDIOVASCULAR SCREENING; LDL GOAL LESS THAN 160 10/31/2010     Priority: Medium       All systems were reviewed and pertinent information in noted in subjective/HPI.    Past Medical History:   Diagnosis Date     ASCUS of cervix with negative high risk HPV 2016    Neg HPV       Past Surgical History:   Procedure Laterality Date     BIOPSY BREAST       BUNIONECTOMY Right 2016    Procedure: BUNIONECTOMY;  Surgeon: Jean Carlos Coleman DPM;  Location: [facility] OR     REMOVE HARDWARE FOOT Right 2018    Procedure: REMOVE HARDWARE FOOT;  Removal of Hardware Right Foot;  Surgeon: Jean Carlos Coleman DPM;  Location: [facility] OR     [facility] APPENDECTOMY  2000    appendectomy          Current Outpatient Medications:      NO ACTIVE MEDICATIONS, ., Disp: , Rfl:      Acetaminophen (TYLENOL) 325 MG CAPS, Take 325-650 mg by mouth every 4 hours as needed, Disp: , Rfl:     ALLERGIES:  Cefzil [cefprozil] and Sulfa drugs    Social History     Socioeconomic History     Marital status:      Spouse name: None     Number of children: None     Years of education: None     Highest education level: None   Tobacco Use     Smoking status: Never Smoker     Smokeless tobacco: Never Used   Vaping Use     Vaping Use: Never used   Substance and Sexual Activity     Alcohol use: No     Drug use: No     Sexual activity: Yes     Partners: Male     Comment:  vasectomy   Other  "Topics Concern     Parent/sibling w/ CABG, MI or angioplasty before 65F 55M? No      Service No     Blood Transfusions No     Caffeine Concern No     Occupational Exposure No     Hobby Hazards No     Sleep Concern No     Stress Concern No     Weight Concern No     Special Diet No     Back Care No     Exercise No     Seat Belt Yes     Self-Exams Yes       Family History   Problem Relation Age of Onset     Gastrointestinal Disease Mother         Diverticulitis     Hypertension Father      Thyroid Disease Paternal Grandfather      Diabetes No family hx of      Breast Cancer No family hx of      Cancer - colorectal No family hx of      Alcohol/Drug No family hx of      Lipids No family hx of        OBJECTIVE:  Vitals: /69 (BP Location: Right arm, Patient Position: Chair, Cuff Size: Adult Regular)   Pulse 62   Temp 97.3  F (36.3  C) (Tympanic)   Resp 18   Ht 1.626 m (5' 4\")   Wt 69.9 kg (154 lb)   Breastfeeding No   BMI 26.43 kg/m   BMI= Body mass index is 26.43 kg/m .   No LMP recorded. Patient is perimenopausal.     GENERAL APPEARANCE: healthy, alert and no distress    ASSESSMENT:      ICD-10-CM    1. Menopause present  Z78.0        PLAN:  I discussed at length with Dot the symptoms of menopause, how HRT was used historically, and how/why it is prescribed today based upon evidence.  I discussed plant sources of phytoestrogens that can help with symptoms and OTC products that may help as well.  We discussed the inherent tendency towards central weight gain in menopause, and the caloric adjustment needed to maintain; all her questions were answered as well as possible.    Annabella Holcomb MD    Total time spent on ONE OR MORE the following services on the date of the encounter:  Preparing to see patient, chart review, review of outside records, , Counseling and educating the patient/family/caregiver  and Documenting clinical information in the electronic or other health record :  30 minutes      "

## 2022-06-16 ENCOUNTER — OFFICE VISIT (OUTPATIENT)
Dept: DERMATOLOGY | Facility: CLINIC | Age: 57
End: 2022-06-16
Payer: COMMERCIAL

## 2022-06-16 DIAGNOSIS — L82.1 SEBORRHEIC KERATOSIS: Primary | ICD-10-CM

## 2022-06-16 DIAGNOSIS — D22.9 MULTIPLE BENIGN NEVI: ICD-10-CM

## 2022-06-16 DIAGNOSIS — D18.01 CHERRY ANGIOMA: ICD-10-CM

## 2022-06-16 DIAGNOSIS — L81.4 LENTIGO: ICD-10-CM

## 2022-06-16 PROCEDURE — 99203 OFFICE O/P NEW LOW 30 MIN: CPT | Performed by: PHYSICIAN ASSISTANT

## 2022-06-16 ASSESSMENT — PAIN SCALES - GENERAL: PAINLEVEL: NO PAIN (0)

## 2022-06-16 NOTE — LETTER
6/16/2022         RE: Dot Kenny  [address] [id number]        Dear Colleague,    Thank you for referring your patient, Dot Kenny, to the [facility]. Please see a copy of my visit note below.    Dot Kenny is an extremely pleasant 57 year old year old female patient here today for skin check. She denies any tender or bleeding skin lesions.She denies any blistering sunburns. She doesn't always wear sunscreen. Patient has no other skin complaints today.  Remainder of the HPI, Meds, PMH, Allergies, FH, and SH was reviewed in chart.    Pertinent Hx:   No personal history of skin cancer. Family history of BCC  Past Medical History:   Diagnosis Date     ASCUS of cervix with negative high risk HPV 03/2016    Neg HPV       Past Surgical History:   Procedure Laterality Date     BIOPSY BREAST       BUNIONECTOMY Right 6/14/2016    Procedure: BUNIONECTOMY;  Surgeon: Jean Carlos Coleman DPM;  Location: [facility] OR     REMOVE HARDWARE FOOT Right 6/5/2018    Procedure: REMOVE HARDWARE FOOT;  Removal of Hardware Right Foot;  Surgeon: Jean Carlos Coleman DPM;  Location: [facility] OR     [facility] APPENDECTOMY  2000    appendectomy         Family History   Problem Relation Age of Onset     Gastrointestinal Disease Mother         Diverticulitis     Basal cell carcinoma Mother      Hypertension Father      Thyroid Disease Paternal Grandfather      Diabetes No family hx of      Breast Cancer No family hx of      Cancer - colorectal No family hx of      Alcohol/Drug No family hx of      Lipids No family hx of        Social History     Socioeconomic History     Marital status:      Spouse name: Not on file     Number of children: Not on file     Years of education: Not on file     Highest education level: Not on file   Occupational History     Not on file   Tobacco Use     Smoking status: Never Smoker     Smokeless tobacco: Never Used   Vaping Use     Vaping Use: Never used   Substance and  Sexual Activity     Alcohol use: No     Drug use: No     Sexual activity: Yes     Partners: Male     Comment:  vasectomy   Other Topics Concern     Parent/sibling w/ CABG, MI or angioplasty before 65F 55M? No      Service No     Blood Transfusions No     Caffeine Concern No     Occupational Exposure No     Hobby Hazards No     Sleep Concern No     Stress Concern No     Weight Concern No     Special Diet No     Back Care No     Exercise No     Bike Helmet Not Asked     Seat Belt Yes     Self-Exams Yes   Social History Narrative     Not on file     Social Determinants of Health     Financial Resource Strain: Not on file   Food Insecurity: Not on file   Transportation Needs: Not on file   Physical Activity: Not on file   Stress: Not on file   Social Connections: Not on file   Intimate Partner Violence: Not on file   Housing Stability: Not on file       Outpatient Encounter Medications as of 6/16/2022   Medication Sig Dispense Refill     NO ACTIVE MEDICATIONS .       [DISCONTINUED] Acetaminophen (TYLENOL) 325 MG CAPS Take 325-650 mg by mouth every 4 hours as needed       No facility-administered encounter medications on file as of 6/16/2022.             O:   NAD, WDWN, Alert & Oriented, Mood & Affect wnl, Vitals stable   Here today alone   There were no vitals taken for this visit.   General appearance normal   Vitals stable   Alert, oriented and in no acute distress     Stuck on papules and brown macules on trunk and ext   Red papules on trunk  Brown papules and macules with regular pigment network and borders on torso and extremities      The remainder of skin exam is normal       Eyes: Conjunctivae/lids:Normal     ENT: Lips: normal    MSK:Normal    Cardiovascular: peripheral edema none    Pulm: Breathing Normal    Neuro/Psych: Orientation:Alert and Orientedx3 ; Mood/Affect:normal   A/P:  1. Seborrheic keratosis, lentigo, angioma, benign nevi   It was a pleasure speaking to Dot Kenny today.  BENIGN  LESIONS DISCUSSED WITH PATIENT:  I discussed the specifics of tumor, prognosis, and genetics of benign lesions.  I explained that treatment of these lesions would be purely cosmetic and not medically neccessary.  I discussed with patient different removal options including excision, cautery and /or laser.      Nature and genetics of benign skin lesions dicussed with patient.  Signs and Symptoms of skin cancer discussed with patient.  ABCDEs of melanoma reviewed with patient.  Patient encouraged to perform monthly skin exams.  UV precautions reviewed with patient.  Risks of non-melanoma skin cancer discussed with patient   Return to clinic in one year or sooner if needed.      Again, thank you for allowing me to participate in the care of your patient.        Sincerely,        Antoinette Peñaloza PA-C

## 2022-06-20 NOTE — PROGRESS NOTES
Dot Kenny is an extremely pleasant 57 year old year old female patient here today for skin check. She denies any tender or bleeding skin lesions.She denies any blistering sunburns. She doesn't always wear sunscreen. Patient has no other skin complaints today.  Remainder of the HPI, Meds, PMH, Allergies, FH, and SH was reviewed in chart.    Pertinent Hx:   No personal history of skin cancer. Family history of BCC  Past Medical History:   Diagnosis Date     ASCUS of cervix with negative high risk HPV 03/2016    Neg HPV       Past Surgical History:   Procedure Laterality Date     BIOPSY BREAST       BUNIONECTOMY Right 6/14/2016    Procedure: BUNIONECTOMY;  Surgeon: Jean Carlos Coleman DPM;  Location: [facility] OR     REMOVE HARDWARE FOOT Right 6/5/2018    Procedure: REMOVE HARDWARE FOOT;  Removal of Hardware Right Foot;  Surgeon: Jean Carlos Coleman DPM;  Location: [facility] OR     [facility] APPENDECTOMY  2000    appendectomy         Family History   Problem Relation Age of Onset     Gastrointestinal Disease Mother         Diverticulitis     Basal cell carcinoma Mother      Hypertension Father      Thyroid Disease Paternal Grandfather      Diabetes No family hx of      Breast Cancer No family hx of      Cancer - colorectal No family hx of      Alcohol/Drug No family hx of      Lipids No family hx of        Social History     Socioeconomic History     Marital status:      Spouse name: Not on file     Number of children: Not on file     Years of education: Not on file     Highest education level: Not on file   Occupational History     Not on file   Tobacco Use     Smoking status: Never Smoker     Smokeless tobacco: Never Used   Vaping Use     Vaping Use: Never used   Substance and Sexual Activity     Alcohol use: No     Drug use: No     Sexual activity: Yes     Partners: Male     Comment:  vasectomy   Other Topics Concern     Parent/sibling w/ CABG, MI or angioplasty before 65F 55M? No      Service No     Blood  Transfusions No     Caffeine Concern No     Occupational Exposure No     Hobby Hazards No     Sleep Concern No     Stress Concern No     Weight Concern No     Special Diet No     Back Care No     Exercise No     Bike Helmet Not Asked     Seat Belt Yes     Self-Exams Yes   Social History Narrative     Not on file     Social Determinants of Health     Financial Resource Strain: Not on file   Food Insecurity: Not on file   Transportation Needs: Not on file   Physical Activity: Not on file   Stress: Not on file   Social Connections: Not on file   Intimate Partner Violence: Not on file   Housing Stability: Not on file       Outpatient Encounter Medications as of 6/16/2022   Medication Sig Dispense Refill     NO ACTIVE MEDICATIONS .       [DISCONTINUED] Acetaminophen (TYLENOL) 325 MG CAPS Take 325-650 mg by mouth every 4 hours as needed       No facility-administered encounter medications on file as of 6/16/2022.             O:   NAD, WDWN, Alert & Oriented, Mood & Affect wnl, Vitals stable   Here today alone   There were no vitals taken for this visit.   General appearance normal   Vitals stable   Alert, oriented and in no acute distress     Stuck on papules and brown macules on trunk and ext   Red papules on trunk  Brown papules and macules with regular pigment network and borders on torso and extremities      The remainder of skin exam is normal       Eyes: Conjunctivae/lids:Normal     ENT: Lips: normal    MSK:Normal    Cardiovascular: peripheral edema none    Pulm: Breathing Normal    Neuro/Psych: Orientation:Alert and Orientedx3 ; Mood/Affect:normal   A/P:  1. Seborrheic keratosis, lentigo, angioma, benign nevi   It was a pleasure speaking to Dot Kenny today.  BENIGN LESIONS DISCUSSED WITH PATIENT:  I discussed the specifics of tumor, prognosis, and genetics of benign lesions.  I explained that treatment of these lesions would be purely cosmetic and not medically neccessary.  I discussed with patient different  removal options including excision, cautery and /or laser.      Nature and genetics of benign skin lesions dicussed with patient.  Signs and Symptoms of skin cancer discussed with patient.  ABCDEs of melanoma reviewed with patient.  Patient encouraged to perform monthly skin exams.  UV precautions reviewed with patient.  Risks of non-melanoma skin cancer discussed with patient   Return to clinic in one year or sooner if needed.

## 2022-07-11 ENCOUNTER — MYC MEDICAL ADVICE (OUTPATIENT)
Dept: FAMILY MEDICINE | Facility: CLINIC | Age: 57
End: 2022-07-11

## 2022-07-11 DIAGNOSIS — Z13.228 SCREENING FOR ENDOCRINE, METABOLIC AND IMMUNITY DISORDER: ICD-10-CM

## 2022-07-11 DIAGNOSIS — Z13.0 SCREENING FOR ENDOCRINE, METABOLIC AND IMMUNITY DISORDER: ICD-10-CM

## 2022-07-11 DIAGNOSIS — Z13.29 SCREENING FOR ENDOCRINE, METABOLIC AND IMMUNITY DISORDER: ICD-10-CM

## 2022-07-11 DIAGNOSIS — Z11.59 NEED FOR HEPATITIS C SCREENING TEST: ICD-10-CM

## 2022-07-11 DIAGNOSIS — Z13.220 LIPID SCREENING: Primary | ICD-10-CM

## 2022-07-12 NOTE — TELEPHONE ENCOUNTER
Please see patient's MyChart message.    Scott AGUIRRE [facility]    Routed to provider:    Does patient need to fast for labs?

## 2022-07-14 NOTE — TELEPHONE ENCOUNTER
Anticipated labs ordered.  Yes she does need to fast.  She can come and do these ahead of the visit with the understanding that if additional issues are raised we may need to redraw additional labs at the time of the visit if warranted.

## 2022-08-11 ENCOUNTER — HOSPITAL ENCOUNTER (OUTPATIENT)
Dept: MAMMOGRAPHY | Facility: CLINIC | Age: 57
Discharge: HOME | End: 2022-08-11
Attending: FAMILY MEDICINE | Admitting: FAMILY MEDICINE
Payer: COMMERCIAL

## 2022-08-11 DIAGNOSIS — Z12.31 VISIT FOR SCREENING MAMMOGRAM: ICD-10-CM

## 2022-08-11 PROCEDURE — 77067 SCR MAMMO BI INCL CAD: CPT

## 2022-08-17 ASSESSMENT — ENCOUNTER SYMPTOMS
SORE THROAT: 0
BREAST MASS: 0
DIARRHEA: 0
PARESTHESIAS: 0
SHORTNESS OF BREATH: 0
FEVER: 0
CHILLS: 0
HEADACHES: 0
PALPITATIONS: 0
MYALGIAS: 0
CONSTIPATION: 0
WEAKNESS: 0
JOINT SWELLING: 0
FREQUENCY: 0
ARTHRALGIAS: 0
HEMATOCHEZIA: 0
DYSURIA: 0
NAUSEA: 0
DIZZINESS: 0
HEMATURIA: 0
COUGH: 0
NERVOUS/ANXIOUS: 0
HEARTBURN: 0
EYE PAIN: 0
ABDOMINAL PAIN: 0

## 2022-08-18 ENCOUNTER — MYC MEDICAL ADVICE (OUTPATIENT)
Dept: FAMILY MEDICINE | Facility: CLINIC | Age: 57
End: 2022-08-18

## 2022-08-18 ENCOUNTER — OFFICE VISIT (OUTPATIENT)
Dept: FAMILY MEDICINE | Facility: CLINIC | Age: 57
End: 2022-08-18
Payer: COMMERCIAL

## 2022-08-18 VITALS
HEIGHT: 64 IN | BODY MASS INDEX: 26.4 KG/M2 | DIASTOLIC BLOOD PRESSURE: 70 MMHG | SYSTOLIC BLOOD PRESSURE: 100 MMHG | TEMPERATURE: 97 F | OXYGEN SATURATION: 99 % | WEIGHT: 154.6 LBS | RESPIRATION RATE: 18 BRPM | HEART RATE: 61 BPM

## 2022-08-18 DIAGNOSIS — N95.1 MENOPAUSAL FLUSHING: ICD-10-CM

## 2022-08-18 DIAGNOSIS — Z13.228 SCREENING FOR ENDOCRINE, METABOLIC AND IMMUNITY DISORDER: ICD-10-CM

## 2022-08-18 DIAGNOSIS — Z13.0 SCREENING FOR ENDOCRINE, METABOLIC AND IMMUNITY DISORDER: ICD-10-CM

## 2022-08-18 DIAGNOSIS — Z13.220 LIPID SCREENING: ICD-10-CM

## 2022-08-18 DIAGNOSIS — Z13.29 SCREENING FOR ENDOCRINE, METABOLIC AND IMMUNITY DISORDER: ICD-10-CM

## 2022-08-18 DIAGNOSIS — Z00.00 WELL ADULT EXAM: Primary | ICD-10-CM

## 2022-08-18 DIAGNOSIS — Z11.59 NEED FOR HEPATITIS C SCREENING TEST: ICD-10-CM

## 2022-08-18 DIAGNOSIS — Z82.49 FAMILY HISTORY OF ATRIAL FIBRILLATION: ICD-10-CM

## 2022-08-18 DIAGNOSIS — N39.3 FEMALE STRESS INCONTINENCE: ICD-10-CM

## 2022-08-18 LAB
ANION GAP SERPL CALCULATED.3IONS-SCNC: 6 MMOL/L (ref 3–14)
BUN SERPL-MCNC: 17 MG/DL (ref 7–30)
CALCIUM SERPL-MCNC: 8.9 MG/DL (ref 8.5–10.1)
CHLORIDE BLD-SCNC: 105 MMOL/L (ref 94–109)
CHOLEST SERPL-MCNC: 175 MG/DL
CO2 SERPL-SCNC: 28 MMOL/L (ref 20–32)
CREAT SERPL-MCNC: 0.78 MG/DL (ref 0.52–1.04)
FASTING STATUS PATIENT QL REPORTED: YES
GFR SERPL CREATININE-BSD FRML MDRD: 89 ML/MIN/1.73M2
GLUCOSE BLD-MCNC: 106 MG/DL (ref 70–99)
HCV AB SERPL QL IA: NONREACTIVE
HDLC SERPL-MCNC: 73 MG/DL
LDLC SERPL CALC-MCNC: 90 MG/DL
NONHDLC SERPL-MCNC: 102 MG/DL
POTASSIUM BLD-SCNC: 3.6 MMOL/L (ref 3.4–5.3)
SODIUM SERPL-SCNC: 139 MMOL/L (ref 133–144)
TRIGL SERPL-MCNC: 59 MG/DL

## 2022-08-18 PROCEDURE — 99214 OFFICE O/P EST MOD 30 MIN: CPT | Mod: 25 | Performed by: FAMILY MEDICINE

## 2022-08-18 PROCEDURE — 80048 BASIC METABOLIC PNL TOTAL CA: CPT | Performed by: FAMILY MEDICINE

## 2022-08-18 PROCEDURE — 99396 PREV VISIT EST AGE 40-64: CPT | Performed by: FAMILY MEDICINE

## 2022-08-18 PROCEDURE — 86803 HEPATITIS C AB TEST: CPT | Performed by: FAMILY MEDICINE

## 2022-08-18 PROCEDURE — 36415 COLL VENOUS BLD VENIPUNCTURE: CPT | Performed by: FAMILY MEDICINE

## 2022-08-18 PROCEDURE — 80061 LIPID PANEL: CPT | Performed by: FAMILY MEDICINE

## 2022-08-18 RX ORDER — VENLAFAXINE HYDROCHLORIDE 75 MG/1
75 CAPSULE, EXTENDED RELEASE ORAL DAILY
Qty: 30 CAPSULE | Refills: 1 | Status: SHIPPED | OUTPATIENT
Start: 2022-08-18 | End: 2022-10-19

## 2022-08-18 ASSESSMENT — ENCOUNTER SYMPTOMS
DIZZINESS: 0
HEARTBURN: 0
HEADACHES: 0
DIARRHEA: 0
PALPITATIONS: 0
NERVOUS/ANXIOUS: 0
BREAST MASS: 0
NAUSEA: 0
ARTHRALGIAS: 0
DYSURIA: 0
FEVER: 0
CONSTIPATION: 0
HEMATURIA: 0
MYALGIAS: 0
HEMATOCHEZIA: 0
EYE PAIN: 0
PARESTHESIAS: 0
JOINT SWELLING: 0
CHILLS: 0
SHORTNESS OF BREATH: 0
FREQUENCY: 0
WEAKNESS: 0
SORE THROAT: 0
ABDOMINAL PAIN: 0
COUGH: 0

## 2022-08-18 ASSESSMENT — PAIN SCALES - GENERAL: PAINLEVEL: NO PAIN (0)

## 2022-08-18 NOTE — PROGRESS NOTES
SUBJECTIVE:   CC: Dot Kenny is an 57 year old woman who presents for preventive health visit.     Patient has been advised of split billing requirements and indicates understanding: Yes  Healthy Habits:     Getting at least 3 servings of Calcium per day:  Yes    Bi-annual eye exam:  Yes    Dental care twice a year:  Yes    Sleep apnea or symptoms of sleep apnea:  Daytime drowsiness    Diet:  Other    Frequency of exercise:  2-3 days/week    Duration of exercise:  15-30 minutes    Taking medications regularly:  Yes    Medication side effects:  Not applicable    PHQ-2 Total Score: 0    Additional concerns today:  Yes        Today's PHQ-2 Score:   PHQ-2 ( 1999 [employer]) 8/17/2022   Q1: Little interest or pleasure in doing things 0   Q2: Feeling down, depressed or hopeless 0   PHQ-2 Score 0   Q1: Little interest or pleasure in doing things Not at all   Q2: Feeling down, depressed or hopeless Not at all   PHQ-2 Score 0     Abuse: Current or Past (Physical, Sexual or Emotional) - No  Do you feel safe in your environment? Yes    Have you ever done Advance Care Planning? (For example, a Health Directive, POLST, or a discussion with a medical provider or your loved ones about your wishes): No, advance care planning information given to patient to review.  Patient plans to discuss their wishes with loved ones or provider.      Social History     Tobacco Use     Smoking status: Never Smoker     Smokeless tobacco: Never Used   Substance Use Topics     Alcohol use: No       Alcohol Use 8/17/2022   Prescreen: >3 drinks/day or >7 drinks/week? No   Prescreen: >3 drinks/day or >7 drinks/week? -       Reviewed orders with patient.  Reviewed health maintenance and updated orders accordingly - Yes  Labs reviewed in EPIC  Patient Active Problem List   Diagnosis     CARDIOVASCULAR SCREENING; LDL GOAL LESS THAN 160     Dysmenorrhea     Female stress incontinence     Menopausal flushing     Family history of atrial fibrillation     Past  Surgical History:   Procedure Laterality Date     BIOPSY BREAST       BUNIONECTOMY Right 6/14/2016    Procedure: BUNIONECTOMY;  Surgeon: Jean Carlos Coleman DPM;  Location: [facility] OR     REMOVE HARDWARE FOOT Right 6/5/2018    Procedure: REMOVE HARDWARE FOOT;  Removal of Hardware Right Foot;  Surgeon: Jean Carlos Coleman DPM;  Location: [facility] OR     [facility] APPENDECTOMY  2000    appendectomy        Social History     Tobacco Use     Smoking status: Never Smoker     Smokeless tobacco: Never Used   Substance Use Topics     Alcohol use: No     Family History   Problem Relation Age of Onset     Gastrointestinal Disease Mother         Diverticulitis     Basal cell carcinoma Mother      Hypertension Father      Thyroid Disease Paternal Grandfather      Diabetes No family hx of      Breast Cancer No family hx of      Cancer - colorectal No family hx of      Alcohol/Drug No family hx of      Lipids No family hx of          Current Outpatient Medications   Medication Sig Dispense Refill     venlafaxine (EFFEXOR XR) 75 MG 24 hr capsule Take 1 capsule (75 mg) by mouth daily 30 capsule 1     NO ACTIVE MEDICATIONS .       Allergies   Allergen Reactions     Cefzil [Cefprozil]      rash     Sulfa Drugs      Rash         Breast Cancer Screening:    FHS-7:   Breast CA Risk Assessment (FHS-7) 7/19/2021 8/11/2022 8/17/2022   Did any of your first-degree relatives have breast or ovarian cancer? No No No   Did any of your relatives have bilateral breast cancer? No No No   Did any man in your family have breast cancer? No No No   Did any woman in your family have breast and ovarian cancer? No No No   Did any woman in your family have breast cancer before age 50 y? No No No   Do you have 2 or more relatives with breast and/or ovarian cancer? No No No   Do you have 2 or more relatives with breast and/or bowel cancer? No No No         Pertinent mammograms are reviewed under the imaging tab.    History of abnormal Pap smear:   NO - age 30-65 PAP  every 5 years with negative HPV co-testing recommended  Last 3 Pap and HPV Results:   PAP / HPV Latest Ref Rng & Units 8/8/2019 3/17/2016 8/1/2013   PAP (Historical) - NIL ASC-US(A) OTHER-NIL, See Result   HPV16 NEG:Negative Negative Negative -   HPV18 NEG:Negative Negative Negative -   HRHPV NEG:Negative Negative Negative -     PAP / HPV Latest Ref Rng & Units 8/8/2019 3/17/2016 8/1/2013   PAP (Historical) - NIL ASC-US(A) OTHER-NIL, See Result   HPV16 NEG:Negative Negative Negative -   HPV18 NEG:Negative Negative Negative -   HRHPV NEG:Negative Negative Negative -     Reviewed and updated as needed this visit by clinical staff   Tobacco  Allergies  Meds  Problems  Med Hx  Surg Hx  Fam Hx  Soc   Hx          Reviewed and updated as needed this visit by Provider   Tobacco  Allergies  Meds  Problems  Med Hx  Surg Hx  Fam Hx           Past Medical History:   Diagnosis Date     ASCUS of cervix with negative high risk HPV 03/2016    Neg HPV      Past Surgical History:   Procedure Laterality Date     BIOPSY BREAST       BUNIONECTOMY Right 6/14/2016    Procedure: BUNIONECTOMY;  Surgeon: Jean Carlos Coleman DPM;  Location: [facility]     REMOVE HARDWARE FOOT Right 6/5/2018    Procedure: REMOVE HARDWARE FOOT;  Removal of Hardware Right Foot;  Surgeon: Jean Carlos Coleman DPM;  Location: [facility] APPENDECTOMY  2000    appendectomy        Review of Systems   Constitutional: Negative for chills and fever.   HENT: Negative for congestion, ear pain, hearing loss and sore throat.    Eyes: Negative for pain and visual disturbance.   Respiratory: Negative for cough and shortness of breath.    Cardiovascular: Negative for chest pain, palpitations and peripheral edema.   Gastrointestinal: Negative for abdominal pain, constipation, diarrhea, heartburn, hematochezia and nausea.   Breasts:  Negative for tenderness, breast mass and discharge.   Genitourinary: Positive for vaginal discharge. Negative for dysuria,  "frequency, genital sores, hematuria, pelvic pain, urgency and vaginal bleeding.   Musculoskeletal: Negative for arthralgias, joint swelling and myalgias.   Skin: Negative for rash.   Neurological: Negative for dizziness, weakness, headaches and paresthesias.   Psychiatric/Behavioral: Negative for mood changes. The patient is not nervous/anxious.           OBJECTIVE:   /70   Pulse 61   Temp 97  F (36.1  C) (Tympanic)   Resp 18   Ht 1.626 m (5' 4\")   Wt 70.1 kg (154 lb 9.6 oz)   LMP  (LMP Unknown)   SpO2 99%   Breastfeeding No   BMI 26.54 kg/m    Physical Exam  GENERAL APPEARANCE: healthy, alert and no distress  EYES: Eyes grossly normal to inspection, PERRL and conjunctivae and sclerae normal  HENT: ear canals and TM's normal  NECK: no adenopathy, no asymmetry, masses, or scars and thyroid normal to palpation  RESP: lungs clear to auscultation - no rales, rhonchi or wheezes  BREAST: normal without masses, tenderness or nipple discharge and no palpable axillary masses or adenopathy  CV: regular rate and rhythm, normal S1 S2, no S3 or S4, no murmur, click or rub, no peripheral edema and peripheral pulses strong  ABDOMEN: soft, nontender, no hepatosplenomegaly, no masses and bowel sounds normal  MS: no musculoskeletal defects are noted and gait is age appropriate without ataxia  SKIN: no suspicious lesions or rashes  NEURO: Normal strength and tone, sensory exam grossly normal, mentation intact and speech normal  PSYCH: mentation appears normal and affect normal/bright    Diagnostic Test Results:  Labs reviewed in Epic    ASSESSMENT/PLAN:   Well adult exam  Previously ordered labs including BMP, lipids, hep C will be drawn today.    Female stress incontinence  Bothersome enough that she would be interested in pursuing surgical treatment options.  Urology consult placed.  - Adult Urology  Referral; Future    Menopausal flushing  Flushing symptoms have escalated in the last year.  LMP was 2 years " "ago.  Discussed estrogen versus venlafaxine versus others.  She would like to try nonhormonal option.  Start venlafaxine 75 mg daily.  If this is working well she can call for refills if not working or she is having side effects she can call and we can troubleshoot that. Discussed use and side effects of medication including, but not limited to, increased risk of suicidal ideation when starting, adjusting dose or stopping medication. Follow-up for re-check in 1 month.    - venlafaxine (EFFEXOR XR) 75 MG 24 hr capsule; Take 1 capsule (75 mg) by mouth daily    Family history of atrial fibrillation  Father has atrial fibrillation.  She has noticed occasional palpitations.  Typically these last 1-2 beats and for 1 to 2 seconds.  No other associated side effects.  Discussed this likely does not represent A. fib and more likely PAC/PVC or other benign ectopy.  Discussed typical symptoms of A. fib.  Follow-up if any of those occur.        Patient has been advised of split billing requirements and indicates understanding: Yes    COUNSELING:  Reviewed preventive health counseling, as reflected in patient instructions    Estimated body mass index is 26.54 kg/m  as calculated from the following:    Height as of this encounter: 1.626 m (5' 4\").    Weight as of this encounter: 70.1 kg (154 lb 9.6 oz).        She reports that she has never smoked. She has never used smokeless tobacco.      Counseling Resources:  ATP IV Guidelines  Pooled Cohorts Equation Calculator  Breast Cancer Risk Calculator  BRCA-Related Cancer Risk Assessment: FHS-7 Tool  FRAX Risk Assessment  ICSI Preventive Guidelines  Dietary Guidelines for Americans, 2010  USDA's MyPlate  ASA Prophylaxis  Lung CA Screening    Mayito Dumont MD  [facility]  "

## 2022-08-19 NOTE — TELEPHONE ENCOUNTER
See my chart message  Question regarding glucose level  106    Evette Law RN on 8/19/2022 at 9:34 AM

## 2022-09-30 ENCOUNTER — OFFICE VISIT (OUTPATIENT)
Dept: UROLOGY | Facility: CLINIC | Age: 57
End: 2022-09-30
Attending: FAMILY MEDICINE
Payer: COMMERCIAL

## 2022-09-30 VITALS — SYSTOLIC BLOOD PRESSURE: 112 MMHG | HEART RATE: 67 BPM | OXYGEN SATURATION: 97 % | DIASTOLIC BLOOD PRESSURE: 72 MMHG

## 2022-09-30 DIAGNOSIS — N39.3 FEMALE STRESS INCONTINENCE: ICD-10-CM

## 2022-09-30 PROCEDURE — 99203 OFFICE O/P NEW LOW 30 MIN: CPT | Performed by: STUDENT IN AN ORGANIZED HEALTH CARE EDUCATION/TRAINING PROGRAM

## 2022-09-30 NOTE — PROGRESS NOTES
Chief Complaint:   Mixed incontinence         History of Present Illness:   Dot Kenny is a 57 year old female with a history of menopausal flushing who presents for evaluation of mixed urinary incontinence.     Patient presents with a several year history of mixed urinary incontinence.  She reports leakage primarily with coughing,, sneezing, and laughing.  She reports occasional urge incontinence.  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  She denies urinary frequency and nocturia.  She denies gross hematuria, dysuria, weak urinary stream, and sensation of incomplete bladder emptying.    She has a history of laparoscopic appendectomy.  She denies any other pelvic or abdominal surgeries.  She has a history of 3 vaginal deliveries.         Past Medical History:     Past Medical History:   Diagnosis Date     ASCUS of cervix with negative high risk HPV 03/2016    Neg HPV            Past Surgical History:     Past Surgical History:   Procedure Laterality Date     BIOPSY BREAST       BUNIONECTOMY Right 6/14/2016    Procedure: BUNIONECTOMY;  Surgeon: Jean Carlos Coleman DPM;  Location: [facility]     REMOVE HARDWARE FOOT Right 6/5/2018    Procedure: REMOVE HARDWARE FOOT;  Removal of Hardware Right Foot;  Surgeon: Jean Carlos Coleman DPM;  Location: [facility]     [facility] APPENDECTOMY  2000    appendectomy             Medications     Current Outpatient Medications   Medication     NO ACTIVE MEDICATIONS     venlafaxine (EFFEXOR XR) 75 MG 24 hr capsule     No current facility-administered medications for this visit.            Allergies:   Cefzil [cefprozil] and Sulfa drugs         Review of Systems:  From intake questionnaire   Negative 14 system review except as noted on HPI, nurse's note.         Physical Exam:   Patient is a 57 year old  female   Vitals: Blood pressure 112/72, pulse 67, SpO2 97 %, not currently breastfeeding.  General Appearance Adult: Alert, no acute distress,  oriented.  Lungs: Non-labored breathing.  Heart: No obvious jugular venous distension present.  Neuro: Alert, oriented, speech and mentation normal  :      Normal external genitalia and introitus, no lesions.        Cystocele does appear to be present.      There was no myofascial tenderness      Rectal exam deferred.      Labs and Pathology:    I personally reviewed all applicable laboratory data and went over findings with patient  Significant for:     BMP RESULTS:  Recent Labs   Lab Test 08/18/22  0823 08/08/19  0930 07/17/18  1311 06/07/18  1750 03/17/16  0914    141 138 142 139   POTASSIUM 3.6 3.7 4.3 4.2 4.2   CHLORIDE 105 108 109 109 109   CO2 28 27 22 27 23   ANIONGAP 6 6 7 6 7   * 99 95 98 88   BUN 17 15 13 15 17   CR 0.78 0.92 0.99 0.88 0.84   GFRESTIMATED 89 72 59* 68 72   GFRESTBLACK  --  83 72 82 88   DAMARI 8.9 8.7 8.3* 8.5 8.8       UA RESULTS:   Recent Labs   Lab Test 07/17/18  1320   SG >1.030   URINEPH 5.5   NITRITE Negative   RBCU O - 2   WBCU 0 - 5            Assessment and Plan:     Assessment: 57 year old female seen in evaluation for mixed urinary incontinence. She reports most bothersome leakage with coughing, sneezing, laughing, etc. She does report some occasional urge incontinence, but denies urinary frequency and nocturia. On exam, she appears to have a cystocele.     We discussed the treatment of stress incontinence to include physical therapy, urethral bulking agents, and surgical options. We discussed the treatment of urge incontinence to include medications, physical therapy, bladder Botox, and Interstim therapy. At this time, she is most bothered by her stress incontinence and would like to meet with one of our urogynecologists for a surgical consult.     Plan:  Follow up with urogynecology for surgical consideration for stress incontinence.     ZUNILDA ZENG PA-C  Department of Urology

## 2022-09-30 NOTE — NURSING NOTE
"Initial /72 (BP Location: Left arm, Patient Position: Chair, Cuff Size: Adult Regular)   Pulse 67   LMP  (LMP Unknown)   SpO2 97%  Estimated body mass index is 26.54 kg/m  as calculated from the following:    Height as of 8/18/22: 1.626 m (5' 4\").    Weight as of 8/18/22: 70.1 kg (154 lb 9.6 oz). .    Tayler Schultz MA on 9/30/2022 at 3:05 PM  "

## 2022-10-18 ENCOUNTER — MYC MEDICAL ADVICE (OUTPATIENT)
Dept: FAMILY MEDICINE | Facility: CLINIC | Age: 57
End: 2022-10-18

## 2022-10-19 DIAGNOSIS — N95.1 MENOPAUSAL FLUSHING: ICD-10-CM

## 2022-10-19 RX ORDER — VENLAFAXINE HYDROCHLORIDE 75 MG/1
75 CAPSULE, EXTENDED RELEASE ORAL DAILY
Qty: 90 CAPSULE | Refills: 2 | Status: SHIPPED | OUTPATIENT
Start: 2022-10-19 | End: 2023-07-17

## 2022-10-19 NOTE — TELEPHONE ENCOUNTER
"Prescription approved per [facility] Refill Protocol.    Pending Prescriptions:                       Disp   Refills    venlafaxine (EFFEXOR XR) 75 MG 24 hr caps*90 cap*2            Sig: Take 1 capsule (75 mg) by mouth daily      Requested Prescriptions   Pending Prescriptions Disp Refills     venlafaxine (EFFEXOR XR) 75 MG 24 hr capsule 90 capsule 2     Sig: Take 1 capsule (75 mg) by mouth daily       Serotonin-Norepinephrine Reuptake Inhibitors  Passed - 10/19/2022 10:02 AM        Passed - Blood pressure under 140/90 in past 12 months     BP Readings from Last 3 Encounters:   09/30/22 112/72   08/18/22 100/70   05/02/22 109/69                 Passed - Recent (12 mo) or future (30 days) visit within the authorizing provider's specialty     Patient has had an office visit with the authorizing provider or a provider within the authorizing providers department within the previous 12 mos or has a future within next 30 days. See \"Patient Info\" tab in inbasket, or \"Choose Columns\" in Meds & Orders section of the refill encounter.              Passed - Medication is active on med list        Passed - Patient is age 18 or older        Passed - No active pregnancy on record        Passed - Normal serum creatinine on file in past 12 months     Recent Labs   Lab Test 08/18/22  0823   CR 0.78       Ok to refill medication if creatinine is low          Passed - No positive pregnancy test in past 12 months           Evette Law RN on 10/19/2022 at 10:03 AM    "

## 2022-10-31 ENCOUNTER — VIRTUAL VISIT (OUTPATIENT)
Dept: UROLOGY | Facility: CLINIC | Age: 57
End: 2022-10-31
Payer: COMMERCIAL

## 2022-10-31 DIAGNOSIS — N95.2 ATROPHIC VAGINITIS: ICD-10-CM

## 2022-10-31 DIAGNOSIS — N39.3 STRESS INCONTINENCE: Primary | ICD-10-CM

## 2022-10-31 PROCEDURE — 99215 OFFICE O/P EST HI 40 MIN: CPT | Mod: 95 | Performed by: UROLOGY

## 2022-10-31 NOTE — PROGRESS NOTES
Dot is a 57 year old who is being evaluated via a billable video visit.      How would you like to obtain your AVS? MyChart  If the video visit is dropped, the invitation should be resent by: Text to cell phone: [phone]  Will anyone else be joining your video visit? No        Video-Visit Details    Video Start Time: 3:12 PM    Type of service:  Video Visit    Video End Time:3:26 PM    Originating Location (pt. Location): Home        Distant Location (provider location):  Off-site    [facility]

## 2022-10-31 NOTE — PROGRESS NOTES
HPI:  Dot Kenny is a 57 year old female with urinary incontinence associated with coughing, laughing, or any activity.    Reviewed previous notes from Mojgan Morse PA-C on 9/30/22    Exam:  There were no vitals taken for this visit.  GENERAL: Healthy, alert and no distress  EYES: Eyes grossly normal to inspection.  No discharge or erythema, or obvious scleral/conjunctival abnormalities.  RESP: No audible wheeze, cough, or visible cyanosis.  No visible retractions or increased work of breathing.    SKIN: Visible skin clear. No significant rash, abnormal pigmentation or lesions.  NEURO: Cranial nerves grossly intact.  Mentation and speech appropriate for age.  PSYCH: Mentation appears normal, affect normal/bright, judgement and insight intact, normal speech and appearance well-groomed.    Review of Imaging:  The following imaging exams were reviewed by me and discussed with patient:  CT abd/pelvis on 1/30/2019  IMPRESSION:    1. No acute finding nor specific reason for left lower quadrant pain  is demonstrated.  2. Small cystic structures are present in the left ovary. Follow-up  pelvic ultrasound in 3 months is recommended in reassessment.      Review of Labs:  The following labs were reviewed by me and discussed with the patient:  Lab Results   Component Value Date    WBC 5.6 07/17/2018     Lab Results   Component Value Date    RBC 3.90 07/17/2018     Lab Results   Component Value Date    HGB 12.2 07/17/2018     Lab Results   Component Value Date    HCT 36.9 07/17/2018     Lab Results   Component Value Date    MCV 95 07/17/2018     Lab Results   Component Value Date    MCH 31.3 07/17/2018     Lab Results   Component Value Date    MCHC 33.1 07/17/2018     Lab Results   Component Value Date    RDW 13.0 07/17/2018     Lab Results   Component Value Date     07/17/2018        Last Comprehensive Metabolic Panel:  Sodium   Date Value Ref Range Status   08/18/2022 139 133 - 144 mmol/L Final   08/08/2019 141 133 -  144 mmol/L Final     Potassium   Date Value Ref Range Status   08/18/2022 3.6 3.4 - 5.3 mmol/L Final   08/08/2019 3.7 3.4 - 5.3 mmol/L Final     Chloride   Date Value Ref Range Status   08/18/2022 105 94 - 109 mmol/L Final   08/08/2019 108 94 - 109 mmol/L Final     Carbon Dioxide   Date Value Ref Range Status   08/08/2019 27 20 - 32 mmol/L Final     Carbon Dioxide (CO2)   Date Value Ref Range Status   08/18/2022 28 20 - 32 mmol/L Final     Anion Gap   Date Value Ref Range Status   08/18/2022 6 3 - 14 mmol/L Final   08/08/2019 6 3 - 14 mmol/L Final     Glucose   Date Value Ref Range Status   08/18/2022 106 (H) 70 - 99 mg/dL Final   08/08/2019 99 70 - 99 mg/dL Final     Comment:     Fasting specimen     Urea Nitrogen   Date Value Ref Range Status   08/18/2022 17 7 - 30 mg/dL Final   08/08/2019 15 7 - 30 mg/dL Final     Creatinine   Date Value Ref Range Status   08/18/2022 0.78 0.52 - 1.04 mg/dL Final   08/08/2019 0.92 0.52 - 1.04 mg/dL Final     GFR Estimate   Date Value Ref Range Status   08/18/2022 89 >60 mL/min/1.73m2 Final     Comment:     Effective December 21, 2021 eGFRcr in adults is calculated using the 2021 CKD-EPI creatinine equation which includes age and gender (Husam snell al., NEJ, DOI: 10.1056/NJQBdw7539389)   08/08/2019 72 >60 mL/min/[1.73_m2] Final     Comment:     Non  GFR Calc  Starting 12/18/2018, serum creatinine based estimated GFR (eGFR) will be   calculated using the Chronic Kidney Disease Epidemiology Collaboration   (CKD-EPI) equation.       Calcium   Date Value Ref Range Status   08/18/2022 8.9 8.5 - 10.1 mg/dL Final   08/08/2019 8.7 8.5 - 10.1 mg/dL Final     Bilirubin Total   Date Value Ref Range Status   07/17/2018 0.5 0.2 - 1.3 mg/dL Final     Alkaline Phosphatase   Date Value Ref Range Status   07/17/2018 52 40 - 150 U/L Final     ALT   Date Value Ref Range Status   07/17/2018 18 0 - 50 U/L Final     AST   Date Value Ref Range Status   07/17/2018 11 0 - 45 U/L Final                 Color Urine (no units)   Date Value   07/17/2018 Yellow     Appearance Urine (no units)   Date Value   07/17/2018 Clear     Glucose Urine (mg/dL)   Date Value   07/17/2018 Negative     Bilirubin Urine (no units)   Date Value   07/17/2018 Negative     Ketones Urine (mg/dL)   Date Value   07/17/2018 Trace (A)     Specific Gravity Urine (no units)   Date Value   07/17/2018 >1.030     pH Urine (pH)   Date Value   07/17/2018 5.5     Protein Albumin Urine (mg/dL)   Date Value   07/17/2018 Trace (A)     Urobilinogen Urine (EU/dL)   Date Value   07/17/2018 0.2     Nitrite Urine (no units)   Date Value   07/17/2018 Negative     Leukocyte Esterase Urine (no units)   Date Value   07/17/2018 Negative          Assessment & Plan   56 y/o female with stress urinary incontinence and atrophic vaginitis.  We discussed options of observation, PT, pelvital flyte, and midurethral sling.  She would like to have a sling.  We discussed the use of mesh in surgery and the risks which include but are not limited to infection, bleeding, exposure of mesh, vaginal pain, injury to the bladder/urethra/rectum or any structures in the abdomen or pelvis, as well as risk of incontinence or urinary retention. There is also risk of need for catheterization and possible further surgery.  The pt. Verbalized understanding and had a chance to ask her questions which were all answered.  -schedule midurethral sling.    Brendan Catalan MD  [facility]      ==========================      Additional Coding Information:    Time spent:  47 minutes spent on the date of the encounter doing chart review, history and exam, documentation and further activities per the note

## 2022-11-01 ENCOUNTER — TELEPHONE (OUTPATIENT)
Dept: UROLOGY | Facility: CLINIC | Age: 57
End: 2022-11-01

## 2022-11-01 ENCOUNTER — MYC MEDICAL ADVICE (OUTPATIENT)
Dept: UROLOGY | Facility: CLINIC | Age: 57
End: 2022-11-01

## 2022-11-01 NOTE — CONFIDENTIAL NOTE
Financial Counselor Review:    Procedure to be performed (include CPT code): 67627    CREATION, VAGINAL SLING, WITH CYSTOSCOPY (Support the urethra with mesh sling and look in the bladder)    Diagnosis code (include ICD-10 code): Stress incontinence [N39.3]    Coverage and patient financial responsibility information:YES    Does patient need to be contacted by Financial Counselor:YES    Please send updates to [facility] Adult [facility] [place] pool.    Iliana Burciaga RN, BSN

## 2022-11-01 NOTE — TELEPHONE ENCOUNTER
Called patient and LVM to schedule surgery with Dr. Catalan. Provided direct call back number to writer - [phone].

## 2022-11-02 NOTE — TELEPHONE ENCOUNTER
PB DOS: TBD *pending cpt codes   Type of Procedure: CREATION, VAGINAL SLING, WITH CYSTOSCOPY   CPT Codes:  ICD10 Codes: Brendan Catalan MD  Surgeon/Ordering provider:  Stress incontinence [N39.3]  Pre-cert/Authorization completed:    Payer: [facility] [place]  Spoke to [employer]  Ref. # / Auth #   Valid Dates:

## 2022-11-02 NOTE — TELEPHONE ENCOUNTER
Scheduled surgery for 1/3/23 in [place] with Dr. Catalan.    H&P with PCP.    PT will take an at home COVID test 1-2 days before surgery and bring the results the day of surgery.     Post-op scheduled for 1/23 and 2/13.    Writer will mail surgery packet.    No further questions/concerns at this time.

## 2022-11-09 NOTE — TELEPHONE ENCOUNTER
PB DOS: TBD   Type of Procedure: CREATION, VAGINAL SLING, WITH CYSTOSCOPY   CPT Codes:54874  ICD10 Codes: Brendan Catalan MD  Surgeon/Ordering provider: Stress incontinence [N39.3]  Pre-cert/Authorization completed: No PA Required   Payer: [employer]  Spoke to [employer]  Ref. # Transaction ID: [id number] / Auth #   Valid Dates:     No Prior Authorization Required with patients [employer] [place] insurance. Please contact patient to schedule and have patient verify coverage and benefits with [employer] [place].     Thanks!

## 2022-11-28 NOTE — TELEPHONE ENCOUNTER
Received bladder consent form. Placed in scanning folder to be scanned into patient chart.    Lata Rosales LPN on 11/28/2022 at 2:44 PM

## 2022-12-19 DIAGNOSIS — N39.3 STRESS INCONTINENCE: Primary | ICD-10-CM

## 2022-12-27 ENCOUNTER — OFFICE VISIT (OUTPATIENT)
Dept: FAMILY MEDICINE | Facility: CLINIC | Age: 57
End: 2022-12-27
Payer: COMMERCIAL

## 2022-12-27 VITALS
DIASTOLIC BLOOD PRESSURE: 62 MMHG | TEMPERATURE: 97.7 F | HEIGHT: 64 IN | SYSTOLIC BLOOD PRESSURE: 110 MMHG | OXYGEN SATURATION: 99 % | HEART RATE: 70 BPM | RESPIRATION RATE: 16 BRPM | WEIGHT: 158 LBS | BODY MASS INDEX: 26.98 KG/M2

## 2022-12-27 DIAGNOSIS — Z01.818 PREOP GENERAL PHYSICAL EXAM: Primary | ICD-10-CM

## 2022-12-27 DIAGNOSIS — N39.3 FEMALE STRESS INCONTINENCE: ICD-10-CM

## 2022-12-27 PROCEDURE — 99214 OFFICE O/P EST MOD 30 MIN: CPT | Performed by: NURSE PRACTITIONER

## 2022-12-27 NOTE — H&P (VIEW-ONLY)
[facility]  [address] [place] [id number]  Phone: [phone]  Fax: [phone]  Primary Provider: Mayito Dumont  Pre-op Performing Provider: TAMAR GONZALEZ    PREOPERATIVE EVALUATION:  Today's date: 12/27/2022    Dot Kenny is a 57 year old female who presents for a preoperative evaluation.    Surgical Information:  Surgery/Procedure: CREATION, VAGINAL SLING, WITH CYSTOSCOPY (Support the urethra with mesh sling and look in the bladder)  Surgery Location: [facility] [place] [place]   Surgeon: Dr. Catalan   Surgery Date: 1/3/2023  Time of Surgery: 0900  Where patient plans to recover: At home with family  Fax number for surgical facility: Note does not need to be faxed, will be available electronically in Epic.    Type of Anesthesia Anticipated: MAC    Assessment & Plan     The proposed surgical procedure is considered INTERMEDIATE risk.    Preop general physical exam    Female stress incontinence     Risks and Recommendations:  The patient has the following additional risks and recommendations for perioperative complications:   - No identified additional risk factors other than previously addressed    Medication Instructions:  Patient is to take all scheduled medications on the day of surgery    RECOMMENDATION:  APPROVAL GIVEN to proceed with proposed procedure, without further diagnostic evaluation.      Subjective     HPI related to upcoming procedure: Stress incontinence     Preop Questions 12/26/2022   1. Have you ever had a heart attack or stroke? No   2. Have you ever had surgery on your heart or blood vessels, such as a stent placement, a coronary artery bypass, or surgery on an artery in your head, neck, heart, or legs? No   3. Do you have chest pain with activity? No   4. Do you have a history of  heart failure? No   5. Do you currently have a cold, bronchitis or symptoms of other infection? No   6. Do you have a cough, shortness of breath, or wheezing? No   7. Do you or  anyone in your family have previous history of blood clots? No   8. Do you or does anyone in your family have a serious bleeding problem such as prolonged bleeding following surgeries or cuts? No   9. Have you ever had problems with anemia or been told to take iron pills? YES - in college   10. Have you had any abnormal blood loss such as black, tarry or bloody stools, or abnormal vaginal bleeding? No   11. Have you ever had a blood transfusion? No   12. Are you willing to have a blood transfusion if it is medically needed before, during, or after your surgery? Yes   13. Have you or any of your relatives ever had problems with anesthesia? No   14. Do you have sleep apnea, excessive snoring or daytime drowsiness? No   15. Do you have any artifical heart valves or other implanted medical devices like a pacemaker, defibrillator, or continuous glucose monitor? No   16. Do you have artificial joints? No   17. Are you allergic to latex? No   18. Is there any chance that you may be pregnant? No       Health Care Directive:  Patient does not have a Health Care Directive or Living Will: Patient states has Advance Directive and will bring in a copy to clinic.    Preoperative Review of :   reviewed - no record of controlled substances prescribed.      Review of Systems  CONSTITUTIONAL: NEGATIVE for fever, chills, change in weight  INTEGUMENTARY/SKIN: NEGATIVE for worrisome rashes, moles or lesions  EYES: NEGATIVE for vision changes or irritation  ENT/MOUTH: NEGATIVE for ear, mouth and throat problems  RESP: NEGATIVE for significant cough or SOB  CV: NEGATIVE for chest pain, palpitations or peripheral edema  GI: NEGATIVE for nausea, abdominal pain, heartburn, or change in bowel habits  : NEGATIVE for frequency, dysuria, or hematuria  MUSCULOSKELETAL: NEGATIVE for significant arthralgias or myalgia  NEURO: NEGATIVE for weakness, dizziness or paresthesias  ENDOCRINE: NEGATIVE for temperature intolerance, skin/hair  "changes  HEME: NEGATIVE for bleeding problems  PSYCHIATRIC: NEGATIVE for changes in mood or affect    Patient Active Problem List    Diagnosis Date Noted     Female stress incontinence 08/18/2022     Priority: Medium     Menopausal flushing 08/18/2022     Priority: Medium     Family history of atrial fibrillation 08/18/2022     Priority: Medium     Dysmenorrhea 11/24/2015     Priority: Medium     CARDIOVASCULAR SCREENING; LDL GOAL LESS THAN 160 10/31/2010     Priority: Medium      Past Medical History:   Diagnosis Date     ASCUS of cervix with negative high risk HPV 03/2016    Neg HPV     Past Surgical History:   Procedure Laterality Date     BIOPSY BREAST       BUNIONECTOMY Right 6/14/2016    Procedure: BUNIONECTOMY;  Surgeon: Jean Carlos Coleman DPM;  Location: [facility] OR     REMOVE HARDWARE FOOT Right 6/5/2018    Procedure: REMOVE HARDWARE FOOT;  Removal of Hardware Right Foot;  Surgeon: Jean Carlos Coleman DPM;  Location: [facility] OR     [facility] APPENDECTOMY  2000    appendectomy      Current Outpatient Medications   Medication Sig Dispense Refill     venlafaxine (EFFEXOR XR) 75 MG 24 hr capsule Take 1 capsule (75 mg) by mouth daily 90 capsule 2       Allergies   Allergen Reactions     Cefzil [Cefprozil]      rash     Sulfa Drugs      Rash          Social History     Tobacco Use     Smoking status: Never     Smokeless tobacco: Never   Substance Use Topics     Alcohol use: No     History   Drug Use No         Objective     /62 (Cuff Size: Adult Regular)   Pulse 70   Temp 97.7  F (36.5  C) (Tympanic)   Resp 16   Ht 1.626 m (5' 4\")   Wt 71.7 kg (158 lb)   SpO2 99%   BMI 27.12 kg/m      Physical Exam    GENERAL APPEARANCE: healthy, alert and no distress     EYES: EOMI, PERRL     HENT: ear canals and TM's normal and nose and mouth without ulcers or lesions     NECK: no adenopathy, no asymmetry, masses, or scars and thyroid normal to palpation     RESP: lungs clear to auscultation - no rales, rhonchi or wheezes     " CV: regular rates and rhythm, normal S1 S2, no S3 or S4 and no murmur, click or rub     ABDOMEN:  soft, nontender, no HSM or masses and bowel sounds normal     MS: extremities normal- no gross deformities noted.     SKIN: no suspicious lesions or rashes     NEURO: Normal strength and tone, sensory exam grossly normal, mentation intact and speech normal     PSYCH: mentation appears normal. and affect normal/bright     LYMPHATICS: No cervical adenopathy    Recent Labs   Lab Test 08/18/22  0823      POTASSIUM 3.6   CR 0.78      Diagnostics:  No labs were ordered during this visit.   No EKG required, no history of coronary heart disease, significant arrhythmia, peripheral arterial disease or other structural heart disease.    Revised Cardiac Risk Index (RCRI):  The patient has the following serious cardiovascular risks for perioperative complications:   - No serious cardiac risks = 0 points     RCRI Interpretation: 0 points: Class I (very low risk - 0.4% complication rate)           Signed Electronically by: DENAE Douglass CNP  Copy of this evaluation report is provided to requesting physician.

## 2022-12-27 NOTE — PROGRESS NOTES
[facility]  [address] [place] [id number]  Phone: [phone]  Fax: [phone]  Primary Provider: Mayito Dumont  Pre-op Performing Provider: TAMAR GONZALEZ    PREOPERATIVE EVALUATION:  Today's date: 12/27/2022    Dot Kenny is a 57 year old female who presents for a preoperative evaluation.    Surgical Information:  Surgery/Procedure: CREATION, VAGINAL SLING, WITH CYSTOSCOPY (Support the urethra with mesh sling and look in the bladder)  [place] [facility] [place]   Surgeon: Dr. Catalan   Surgery Date: 1/3/2023  Time of Surgery: 0900  Where patient plans to recover: At home with family  Fax number for surgical facility: Note does not need to be faxed, will be available electronically in Epic.    Type of Anesthesia Anticipated: MAC    Assessment & Plan     The proposed surgical procedure is considered INTERMEDIATE risk.    Preop general physical exam    Female stress incontinence     Risks and Recommendations:  The patient has the following additional risks and recommendations for perioperative complications:   - No identified additional risk factors other than previously addressed    Medication Instructions:  Patient is to take all scheduled medications on the day of surgery    RECOMMENDATION:  APPROVAL GIVEN to proceed with proposed procedure, without further diagnostic evaluation.      Subjective     HPI related to upcoming procedure: Stress incontinence     Preop Questions 12/26/2022   1. Have you ever had a heart attack or stroke? No   2. Have you ever had surgery on your heart or blood vessels, such as a stent placement, a coronary artery bypass, or surgery on an artery in your head, neck, heart, or legs? No   3. Do you have chest pain with activity? No   4. Do you have a history of  heart failure? No   5. Do you currently have a cold, bronchitis or symptoms of other infection? No   6. Do you have a cough, shortness of breath, or wheezing? No   7. Do you or  anyone in your family have previous history of blood clots? No   8. Do you or does anyone in your family have a serious bleeding problem such as prolonged bleeding following surgeries or cuts? No   9. Have you ever had problems with anemia or been told to take iron pills? YES - in college   10. Have you had any abnormal blood loss such as black, tarry or bloody stools, or abnormal vaginal bleeding? No   11. Have you ever had a blood transfusion? No   12. Are you willing to have a blood transfusion if it is medically needed before, during, or after your surgery? Yes   13. Have you or any of your relatives ever had problems with anesthesia? No   14. Do you have sleep apnea, excessive snoring or daytime drowsiness? No   15. Do you have any artifical heart valves or other implanted medical devices like a pacemaker, defibrillator, or continuous glucose monitor? No   16. Do you have artificial joints? No   17. Are you allergic to latex? No   18. Is there any chance that you may be pregnant? No       Health Care Directive:  Patient does not have a Health Care Directive or Living Will: Patient states has Advance Directive and will bring in a copy to clinic.    Preoperative Review of :   reviewed - no record of controlled substances prescribed.      Review of Systems  CONSTITUTIONAL: NEGATIVE for fever, chills, change in weight  INTEGUMENTARY/SKIN: NEGATIVE for worrisome rashes, moles or lesions  EYES: NEGATIVE for vision changes or irritation  ENT/MOUTH: NEGATIVE for ear, mouth and throat problems  RESP: NEGATIVE for significant cough or SOB  CV: NEGATIVE for chest pain, palpitations or peripheral edema  GI: NEGATIVE for nausea, abdominal pain, heartburn, or change in bowel habits  : NEGATIVE for frequency, dysuria, or hematuria  MUSCULOSKELETAL: NEGATIVE for significant arthralgias or myalgia  NEURO: NEGATIVE for weakness, dizziness or paresthesias  ENDOCRINE: NEGATIVE for temperature intolerance, skin/hair  "changes  HEME: NEGATIVE for bleeding problems  PSYCHIATRIC: NEGATIVE for changes in mood or affect    Patient Active Problem List    Diagnosis Date Noted     Female stress incontinence 08/18/2022     Priority: Medium     Menopausal flushing 08/18/2022     Priority: Medium     Family history of atrial fibrillation 08/18/2022     Priority: Medium     Dysmenorrhea 11/24/2015     Priority: Medium     CARDIOVASCULAR SCREENING; LDL GOAL LESS THAN 160 10/31/2010     Priority: Medium      Past Medical History:   Diagnosis Date     ASCUS of cervix with negative high risk HPV 03/2016    Neg HPV     Past Surgical History:   Procedure Laterality Date     BIOPSY BREAST       BUNIONECTOMY Right 6/14/2016    Procedure: BUNIONECTOMY;  Surgeon: Jean Carlos Coleman DPM;  Location: [facility] OR     REMOVE HARDWARE FOOT Right 6/5/2018    Procedure: REMOVE HARDWARE FOOT;  Removal of Hardware Right Foot;  Surgeon: Jean Carlos Coleman DPM;  Location: [facility] OR     [facility] APPENDECTOMY  2000    appendectomy      Current Outpatient Medications   Medication Sig Dispense Refill     venlafaxine (EFFEXOR XR) 75 MG 24 hr capsule Take 1 capsule (75 mg) by mouth daily 90 capsule 2       Allergies   Allergen Reactions     Cefzil [Cefprozil]      rash     Sulfa Drugs      Rash          Social History     Tobacco Use     Smoking status: Never     Smokeless tobacco: Never   Substance Use Topics     Alcohol use: No     History   Drug Use No         Objective     /62 (Cuff Size: Adult Regular)   Pulse 70   Temp 97.7  F (36.5  C) (Tympanic)   Resp 16   Ht 1.626 m (5' 4\")   Wt 71.7 kg (158 lb)   SpO2 99%   BMI 27.12 kg/m      Physical Exam    GENERAL APPEARANCE: healthy, alert and no distress     EYES: EOMI, PERRL     HENT: ear canals and TM's normal and nose and mouth without ulcers or lesions     NECK: no adenopathy, no asymmetry, masses, or scars and thyroid normal to palpation     RESP: lungs clear to auscultation - no rales, rhonchi or wheezes     " CV: regular rates and rhythm, normal S1 S2, no S3 or S4 and no murmur, click or rub     ABDOMEN:  soft, nontender, no HSM or masses and bowel sounds normal     MS: extremities normal- no gross deformities noted.     SKIN: no suspicious lesions or rashes     NEURO: Normal strength and tone, sensory exam grossly normal, mentation intact and speech normal     PSYCH: mentation appears normal. and affect normal/bright     LYMPHATICS: No cervical adenopathy    Recent Labs   Lab Test 08/18/22  0823      POTASSIUM 3.6   CR 0.78      Diagnostics:  No labs were ordered during this visit.   No EKG required, no history of coronary heart disease, significant arrhythmia, peripheral arterial disease or other structural heart disease.    Revised Cardiac Risk Index (RCRI):  The patient has the following serious cardiovascular risks for perioperative complications:   - No serious cardiac risks = 0 points     RCRI Interpretation: 0 points: Class I (very low risk - 0.4% complication rate)           Signed Electronically by: DENAE Douglass CNP  Copy of this evaluation report is provided to requesting physician.

## 2022-12-27 NOTE — PATIENT INSTRUCTIONS
For informational purposes only. Not to replace the advice of your health care provider. Copyright   2003,  [employer] [facility]. All rights reserved. Clinically reviewed by Mariann Castro MD. [employer] [id number] - REV .  Preparing for Your Surgery  Getting started  A nurse will call you to review your health history and instructions. They will give you an arrival time based on your scheduled surgery time. Please be ready to share:    Your doctor's clinic name and phone number    Your medical, surgical, and anesthesia history    A list of allergies and sensitivities    A list of medicines, including herbal treatments and over-the-counter drugs    Whether the patient has a legal guardian (ask how to send us the papers in advance)  Please tell us if you're pregnant--or if there's any chance you might be pregnant. Some surgeries may injure a fetus (unborn baby), so they require a pregnancy test. Surgeries that are safe for a fetus don't always need a test, and you can choose whether to have one.   If you have a child who's having surgery, please ask for a copy of Preparing for Your Child's Surgery.    Preparing for surgery    Within 10 to 30 days of surgery: Have a pre-op exam (sometimes called an H&P, or History and Physical). This can be done at a clinic or pre-operative center.  ? If you're having a , you may not need this exam. Talk to your care team.    At your pre-op exam, talk to your care team about all medicines you take. If you need to stop any medicines before surgery, ask when to start taking them again.  ? We do this for your safety. Many medicines can make you bleed too much during surgery. Some change how well surgery (anesthesia) drugs work.    Call your insurance company to let them know you're having surgery. (If you don't have insurance, call [phone].)    Call your clinic if there's any change in your health. This includes signs of a cold or flu (sore throat, runny nose,  cough, rash, fever). It also includes a scrape or scratch near the surgery site.    If you have questions on the day of surgery, call your hospital or surgery center.  Eating and drinking guidelines  For your safety: Unless your surgeon tells you otherwise, follow the guidelines below.    Eat and drink as usual until 8 hours before you arrive for surgery. After that, no food or milk.    Drink clear liquids until 2 hours before you arrive. These are liquids you can see through, like water, Gatorade, and Propel Water. They also include plain black coffee and tea (no cream or milk), candy, and breath mints. You can spit out gum when you arrive.    If you drink alcohol: Stop drinking it the night before surgery.    If your care team tells you to take medicine on the morning of surgery, it's okay to take it with a sip of water.  Preventing infection    Shower or bathe the night before and morning of your surgery. Follow the instructions your clinic gave you. (If no instructions, use regular soap.)    Don't shave or clip hair near your surgery site. We'll remove the hair if needed.    Don't smoke or vape the morning of surgery. You may chew nicotine gum up to 2 hours before surgery. A nicotine patch is okay.  ? Note: Some surgeries require you to completely quit smoking and nicotine. Check with your surgeon.    Your care team will make every effort to keep you safe from infection. We will:  ? Clean our hands often with soap and water (or an alcohol-based hand rub).  ? Clean the skin at your surgery site with a special soap that kills germs.  ? Give you a special gown to keep you warm. (Cold raises the risk of infection.)  ? Wear special hair covers, masks, gowns and gloves during surgery.  ? Give antibiotic medicine, if prescribed. Not all surgeries need antibiotics.  What to bring on the day of surgery    Photo ID and insurance card    Copy of your health care directive, if you have one    Glasses and hearing aids (bring  cases)  ? You can't wear contacts during surgery    Inhaler and eye drops, if you use them (tell us about these when you arrive)    CPAP machine or breathing device, if you use them    A few personal items, if spending the night    If you have . . .  ? A pacemaker, ICD (cardiac defibrillator) or other implant: Bring the ID card.  ? An implanted stimulator: Bring the remote control.  ? A legal guardian: Bring a copy of the certified (court-stamped) guardianship papers.  Please remove any jewelry, including body piercings. Leave jewelry and other valuables at home.  If you're going home the day of surgery    You must have a responsible adult drive you home. They should stay with you overnight as well.    If you don't have someone to stay with you, and you aren't safe to go home alone, we may keep you overnight. Insurance often won't pay for this.  After surgery  If it's hard to control your pain or you need more pain medicine, please call your surgeon's office.  Questions?   If you have any questions for your care team, list them here: _________________________________________________________________________________________________________________________________________________________________________ ____________________________________ ____________________________________ ____________________________________

## 2022-12-29 ENCOUNTER — LAB (OUTPATIENT)
Dept: LAB | Facility: CLINIC | Age: 57
End: 2022-12-29
Payer: COMMERCIAL

## 2022-12-29 DIAGNOSIS — N39.3 STRESS INCONTINENCE: ICD-10-CM

## 2022-12-29 LAB
ALBUMIN UR-MCNC: NEGATIVE MG/DL
APPEARANCE UR: CLEAR
BILIRUB UR QL STRIP: NEGATIVE
COLOR UR AUTO: YELLOW
GLUCOSE UR STRIP-MCNC: NEGATIVE MG/DL
HGB UR QL STRIP: ABNORMAL
KETONES UR STRIP-MCNC: NEGATIVE MG/DL
LEUKOCYTE ESTERASE UR QL STRIP: NEGATIVE
NITRATE UR QL: NEGATIVE
PH UR STRIP: 5.5 [PH] (ref 5–7)
RBC #/AREA URNS AUTO: NORMAL /HPF
SP GR UR STRIP: >=1.03 (ref 1–1.03)
UROBILINOGEN UR STRIP-ACNC: 0.2 E.U./DL
WBC #/AREA URNS AUTO: NORMAL /HPF

## 2022-12-29 PROCEDURE — 87086 URINE CULTURE/COLONY COUNT: CPT

## 2022-12-29 PROCEDURE — 81001 URINALYSIS AUTO W/SCOPE: CPT

## 2022-12-31 LAB — BACTERIA UR CULT: NO GROWTH

## 2023-01-02 ENCOUNTER — ANESTHESIA EVENT (OUTPATIENT)
Dept: SURGERY | Facility: AMBULATORY SURGERY CENTER | Age: 58
End: 2023-01-02
Payer: COMMERCIAL

## 2023-01-03 ENCOUNTER — HOSPITAL ENCOUNTER (OUTPATIENT)
Facility: AMBULATORY SURGERY CENTER | Age: 58
Discharge: HOME | End: 2023-01-03
Attending: UROLOGY
Payer: COMMERCIAL

## 2023-01-03 ENCOUNTER — ANESTHESIA (OUTPATIENT)
Dept: SURGERY | Facility: AMBULATORY SURGERY CENTER | Age: 58
End: 2023-01-03
Payer: COMMERCIAL

## 2023-01-03 VITALS
HEART RATE: 66 BPM | WEIGHT: 158 LBS | RESPIRATION RATE: 16 BRPM | DIASTOLIC BLOOD PRESSURE: 73 MMHG | HEIGHT: 64 IN | OXYGEN SATURATION: 100 % | TEMPERATURE: 97.1 F | SYSTOLIC BLOOD PRESSURE: 118 MMHG | BODY MASS INDEX: 26.98 KG/M2

## 2023-01-03 DIAGNOSIS — N39.3 FEMALE STRESS INCONTINENCE: Primary | ICD-10-CM

## 2023-01-03 PROCEDURE — 57288 REPAIR BLADDER DEFECT: CPT | Performed by: UROLOGY

## 2023-01-03 PROCEDURE — 57288 REPAIR BLADDER DEFECT: CPT

## 2023-01-03 PROCEDURE — C1771 REP DEV, URINARY, W/SLING: HCPCS

## 2023-01-03 DEVICE — SLING SOLYX TRANSVAGINAL MESH M0068507000: Type: IMPLANTABLE DEVICE | Site: VAGINA | Status: FUNCTIONAL

## 2023-01-03 RX ORDER — ONDANSETRON 4 MG/1
4 TABLET, ORALLY DISINTEGRATING ORAL EVERY 30 MIN PRN
Status: DISCONTINUED | OUTPATIENT
Start: 2023-01-03 | End: 2023-01-04 | Stop reason: HOSPADM

## 2023-01-03 RX ORDER — HYDROMORPHONE HYDROCHLORIDE 1 MG/ML
0.2 INJECTION, SOLUTION INTRAMUSCULAR; INTRAVENOUS; SUBCUTANEOUS EVERY 5 MIN PRN
Status: DISCONTINUED | OUTPATIENT
Start: 2023-01-03 | End: 2023-01-03 | Stop reason: HOSPADM

## 2023-01-03 RX ORDER — LIDOCAINE 40 MG/G
CREAM TOPICAL
Status: DISCONTINUED | OUTPATIENT
Start: 2023-01-03 | End: 2023-01-03 | Stop reason: HOSPADM

## 2023-01-03 RX ORDER — FENTANYL CITRATE 50 UG/ML
25 INJECTION, SOLUTION INTRAMUSCULAR; INTRAVENOUS EVERY 5 MIN PRN
Status: DISCONTINUED | OUTPATIENT
Start: 2023-01-03 | End: 2023-01-03 | Stop reason: HOSPADM

## 2023-01-03 RX ORDER — ONDANSETRON 2 MG/ML
INJECTION INTRAMUSCULAR; INTRAVENOUS PRN
Status: DISCONTINUED | OUTPATIENT
Start: 2023-01-03 | End: 2023-01-03

## 2023-01-03 RX ORDER — OXYCODONE HYDROCHLORIDE 5 MG/1
5 TABLET ORAL EVERY 6 HOURS PRN
Qty: 6 TABLET | Refills: 0 | Status: SHIPPED | OUTPATIENT
Start: 2023-01-03 | End: 2023-04-27

## 2023-01-03 RX ORDER — PROPOFOL 10 MG/ML
INJECTION, EMULSION INTRAVENOUS CONTINUOUS PRN
Status: DISCONTINUED | OUTPATIENT
Start: 2023-01-03 | End: 2023-01-03

## 2023-01-03 RX ORDER — SODIUM CHLORIDE, SODIUM LACTATE, POTASSIUM CHLORIDE, CALCIUM CHLORIDE 600; 310; 30; 20 MG/100ML; MG/100ML; MG/100ML; MG/100ML
INJECTION, SOLUTION INTRAVENOUS CONTINUOUS
Status: DISCONTINUED | OUTPATIENT
Start: 2023-01-03 | End: 2023-01-04 | Stop reason: HOSPADM

## 2023-01-03 RX ORDER — CEFAZOLIN SODIUM 2 G/50ML
2 SOLUTION INTRAVENOUS
Status: DISCONTINUED | OUTPATIENT
Start: 2023-01-03 | End: 2023-01-03 | Stop reason: HOSPADM

## 2023-01-03 RX ORDER — FENTANYL CITRATE 50 UG/ML
50 INJECTION, SOLUTION INTRAMUSCULAR; INTRAVENOUS EVERY 5 MIN PRN
Status: DISCONTINUED | OUTPATIENT
Start: 2023-01-03 | End: 2023-01-03 | Stop reason: HOSPADM

## 2023-01-03 RX ORDER — FENTANYL CITRATE 50 UG/ML
25 INJECTION, SOLUTION INTRAMUSCULAR; INTRAVENOUS
Status: DISCONTINUED | OUTPATIENT
Start: 2023-01-03 | End: 2023-01-04 | Stop reason: HOSPADM

## 2023-01-03 RX ORDER — ONDANSETRON 2 MG/ML
4 INJECTION INTRAMUSCULAR; INTRAVENOUS EVERY 30 MIN PRN
Status: DISCONTINUED | OUTPATIENT
Start: 2023-01-03 | End: 2023-01-04 | Stop reason: HOSPADM

## 2023-01-03 RX ORDER — LIDOCAINE HYDROCHLORIDE 20 MG/ML
INJECTION, SOLUTION INFILTRATION; PERINEURAL PRN
Status: DISCONTINUED | OUTPATIENT
Start: 2023-01-03 | End: 2023-01-03

## 2023-01-03 RX ORDER — OXYCODONE HYDROCHLORIDE 5 MG/1
10 TABLET ORAL EVERY 4 HOURS PRN
Status: DISCONTINUED | OUTPATIENT
Start: 2023-01-03 | End: 2023-01-04 | Stop reason: HOSPADM

## 2023-01-03 RX ORDER — FENTANYL CITRATE 50 UG/ML
INJECTION, SOLUTION INTRAMUSCULAR; INTRAVENOUS PRN
Status: DISCONTINUED | OUTPATIENT
Start: 2023-01-03 | End: 2023-01-03

## 2023-01-03 RX ORDER — MEPERIDINE HYDROCHLORIDE 25 MG/ML
12.5 INJECTION INTRAMUSCULAR; INTRAVENOUS; SUBCUTANEOUS
Status: DISCONTINUED | OUTPATIENT
Start: 2023-01-03 | End: 2023-01-04 | Stop reason: HOSPADM

## 2023-01-03 RX ORDER — CEFAZOLIN SODIUM 1 G/3ML
INJECTION, POWDER, FOR SOLUTION INTRAMUSCULAR; INTRAVENOUS PRN
Status: DISCONTINUED | OUTPATIENT
Start: 2023-01-03 | End: 2023-01-03

## 2023-01-03 RX ORDER — CEFAZOLIN SODIUM 2 G/50ML
2 SOLUTION INTRAVENOUS SEE ADMIN INSTRUCTIONS
Status: DISCONTINUED | OUTPATIENT
Start: 2023-01-03 | End: 2023-01-03 | Stop reason: HOSPADM

## 2023-01-03 RX ORDER — ACETAMINOPHEN 325 MG/1
975 TABLET ORAL ONCE
Status: COMPLETED | OUTPATIENT
Start: 2023-01-03 | End: 2023-01-03

## 2023-01-03 RX ORDER — SODIUM CHLORIDE, SODIUM LACTATE, POTASSIUM CHLORIDE, CALCIUM CHLORIDE 600; 310; 30; 20 MG/100ML; MG/100ML; MG/100ML; MG/100ML
INJECTION, SOLUTION INTRAVENOUS CONTINUOUS
Status: DISCONTINUED | OUTPATIENT
Start: 2023-01-03 | End: 2023-01-03 | Stop reason: HOSPADM

## 2023-01-03 RX ORDER — HYDROMORPHONE HYDROCHLORIDE 1 MG/ML
0.4 INJECTION, SOLUTION INTRAMUSCULAR; INTRAVENOUS; SUBCUTANEOUS EVERY 5 MIN PRN
Status: DISCONTINUED | OUTPATIENT
Start: 2023-01-03 | End: 2023-01-03 | Stop reason: HOSPADM

## 2023-01-03 RX ORDER — OXYCODONE HYDROCHLORIDE 5 MG/1
5 TABLET ORAL EVERY 4 HOURS PRN
Status: DISCONTINUED | OUTPATIENT
Start: 2023-01-03 | End: 2023-01-04 | Stop reason: HOSPADM

## 2023-01-03 RX ORDER — BUPIVACAINE HYDROCHLORIDE AND EPINEPHRINE 2.5; 5 MG/ML; UG/ML
INJECTION, SOLUTION INFILTRATION; PERINEURAL PRN
Status: DISCONTINUED | OUTPATIENT
Start: 2023-01-03 | End: 2023-01-03 | Stop reason: HOSPADM

## 2023-01-03 RX ORDER — KETAMINE HYDROCHLORIDE 10 MG/ML
INJECTION INTRAMUSCULAR; INTRAVENOUS PRN
Status: DISCONTINUED | OUTPATIENT
Start: 2023-01-03 | End: 2023-01-03

## 2023-01-03 RX ADMIN — CEFAZOLIN SODIUM 2 G: 1 INJECTION, POWDER, FOR SOLUTION INTRAMUSCULAR; INTRAVENOUS at 09:00

## 2023-01-03 RX ADMIN — ONDANSETRON 4 MG: 2 INJECTION INTRAMUSCULAR; INTRAVENOUS at 09:03

## 2023-01-03 RX ADMIN — SODIUM CHLORIDE, SODIUM LACTATE, POTASSIUM CHLORIDE, CALCIUM CHLORIDE: 600; 310; 30; 20 INJECTION, SOLUTION INTRAVENOUS at 07:37

## 2023-01-03 RX ADMIN — KETAMINE HYDROCHLORIDE 30 MG: 10 INJECTION INTRAMUSCULAR; INTRAVENOUS at 09:03

## 2023-01-03 RX ADMIN — LIDOCAINE HYDROCHLORIDE 60 MG: 20 INJECTION, SOLUTION INFILTRATION; PERINEURAL at 09:03

## 2023-01-03 RX ADMIN — FENTANYL CITRATE 25 MCG: 50 INJECTION, SOLUTION INTRAMUSCULAR; INTRAVENOUS at 09:18

## 2023-01-03 RX ADMIN — ACETAMINOPHEN 975 MG: 325 TABLET ORAL at 07:37

## 2023-01-03 RX ADMIN — PROPOFOL 150 MCG/KG/MIN: 10 INJECTION, EMULSION INTRAVENOUS at 09:04

## 2023-01-03 RX ADMIN — FENTANYL CITRATE 50 MCG: 50 INJECTION, SOLUTION INTRAMUSCULAR; INTRAVENOUS at 09:03

## 2023-01-03 NOTE — INTERVAL H&P NOTE
"I have reviewed the surgical (or preoperative) H&P that is linked to this encounter, and examined the patient. There are no significant changes    Clinical Conditions Present on Arrival:  Clinically Significant Risk Factors Present on Admission                    # Overweight: Estimated body mass index is 27.12 kg/m  as calculated from the following:    Height as of this encounter: 1.626 m (5' 4\").    Weight as of this encounter: 71.7 kg (158 lb).       "

## 2023-01-03 NOTE — ANESTHESIA POSTPROCEDURE EVALUATION
Patient: Dot Kenny    Procedure: Procedure(s):  CREATION, VAGINAL SLING, WITH CYSTOSCOPY (Support the urethra with mesh sling and look in the bladder)       Anesthesia Type:  MAC    Note:  Disposition: Outpatient   Postop Pain Control: Uneventful            Sign Out: Well controlled pain   PONV: No   Neuro/Psych: Uneventful            Sign Out: Acceptable/Baseline neuro status   Airway/Respiratory: Uneventful            Sign Out: Acceptable/Baseline resp. status   CV/Hemodynamics: Uneventful            Sign Out: Acceptable CV status   Other NRE: NONE   DID A NON-ROUTINE EVENT OCCUR? No           Last vitals:  Vitals Value Taken Time   /73 01/03/23 1015   Temp 36.2  C (97.1  F) 01/03/23 1015   Pulse 66 01/03/23 1015   Resp 16 01/03/23 1015   SpO2 100 % 01/03/23 1015       Electronically Signed By: Carlos Dent MD  January 3, 2023  11:11 AM

## 2023-01-03 NOTE — BRIEF OP NOTE
[facility]    Brief Operative Note    Pre-operative diagnosis: Stress incontinence [N39.3]  Post-operative diagnosis Same as pre-operative diagnosis    Procedure: Procedure(s):  CREATION, VAGINAL SLING, WITH CYSTOSCOPY (Support the urethra with mesh sling and look in the bladder)  Surgeon: Surgeon(s) and Role:     * Brendan Catalan MD - Primary     * Beau Manning MD - Resident - Assisting  Anesthesia: MAC   Estimated Blood Loss: Less than 10 ml    Drains: None  Specimens: * No specimens in log *  Findings:   Cystocele and rectocele. Midurethral sling placed with negative leak test, no evidence of mesh in bladder or urethra.  Complications: None.  Implants:   Implant Name Type Inv. Item Serial No.  Lot No. LRB No. Used Action   SLING SOLYX TRANSVAGINAL MESH [id number] - [id number] Mesh SLING SOLYX TRANSVAGINAL MESH [id number]  [employer] [id number] N/A 1 Implanted

## 2023-01-03 NOTE — ANESTHESIA CARE TRANSFER NOTE
Patient: Dot Kenny    Procedure: Procedure(s):  CREATION, VAGINAL SLING, WITH CYSTOSCOPY (Support the urethra with mesh sling and look in the bladder)       Diagnosis: Stress incontinence [N39.3]  Diagnosis Additional Information: No value filed.    Anesthesia Type:   MAC     Note:    Oropharynx: oropharynx clear of all foreign objects and spontaneously breathing  Level of Consciousness: awake  Oxygen Supplementation: room air    Independent Airway: airway patency satisfactory and stable  Dentition: dentition unchanged  Vital Signs Stable: post-procedure vital signs reviewed and stable  Report to RN Given: handoff report given  Patient transferred to: Phase II    Handoff Report: Identifed the Patient, Identified the Reponsible Provider, Reviewed the pertinent medical history, Discussed the surgical course, Reviewed Intra-OP anesthesia mangement and issues during anesthesia, Set expectations for post-procedure period and Allowed opportunity for questions and acknowledgement of understanding      Vitals:  Vitals Value Taken Time   BP 94/52 01/03/23 0941   Temp 36.1  C (96.9  F) 01/03/23 0941   Pulse 68 01/03/23 0941   Resp 14 01/03/23 0941   SpO2 96 % 01/03/23 0941       Electronically Signed By: DENAE Samuel CRNA  January 3, 2023  9:46 AM

## 2023-01-03 NOTE — ANESTHESIA PREPROCEDURE EVALUATION
Anesthesia Pre-Procedure Evaluation    Patient: Dot Kenny   MRN: [id number] : 1965        Procedure : Procedure(s):  CREATION, VAGINAL SLING, WITH CYSTOSCOPY (Support the urethra with mesh sling and look in the bladder)          Past Medical History:   Diagnosis Date     ASCUS of cervix with negative high risk HPV 2016    Neg HPV      Past Surgical History:   Procedure Laterality Date     BIOPSY BREAST       BUNIONECTOMY Right 2016    Procedure: BUNIONECTOMY;  Surgeon: Jean Carlos Coleman DPM;  Location: [facility] OR     REMOVE HARDWARE FOOT Right 2018    Procedure: REMOVE HARDWARE FOOT;  Removal of Hardware Right Foot;  Surgeon: Jean Carlos Coleman DPM;  Location: [facility] OR     [facility] APPENDECTOMY  2000    appendectomy       Allergies   Allergen Reactions     Cefzil [Cefprozil]      rash     Sulfa Drugs      Rash        Social History     Tobacco Use     Smoking status: Never     Smokeless tobacco: Never   Substance Use Topics     Alcohol use: No      Wt Readings from Last 1 Encounters:   23 71.7 kg (158 lb)        Anesthesia Evaluation            ROS/MED HX  ENT/Pulmonary:  - neg pulmonary ROS     Neurologic:  - neg neurologic ROS     Cardiovascular:  - neg cardiovascular ROS     METS/Exercise Tolerance:     Hematologic:  - neg hematologic  ROS     Musculoskeletal:  - neg musculoskeletal ROS     GI/Hepatic:  - neg GI/hepatic ROS     Renal/Genitourinary:  - neg Renal ROS     Endo:  - neg endo ROS     Psychiatric/Substance Use:  - neg psychiatric ROS     Infectious Disease:  - neg infectious disease ROS     Malignancy:  - neg malignancy ROS     Other:  - neg other ROS          Physical Exam    Airway  airway exam normal      Mallampati: II   TM distance: > 3 FB   Neck ROM: full   Mouth opening: > 3 cm    Respiratory Devices and Support         Dental  no notable dental history         Cardiovascular          Rhythm and rate: regular and normal     Pulmonary   pulmonary exam normal        breath  sounds clear to auscultation           OUTSIDE LABS:  CBC:   Lab Results   Component Value Date    WBC 5.6 07/17/2018    WBC 7.3 06/07/2018    HGB 12.2 07/17/2018    HGB 11.9 06/07/2018    HCT 36.9 07/17/2018    HCT 36.4 06/07/2018     07/17/2018     06/07/2018     BMP:   Lab Results   Component Value Date     08/18/2022     08/08/2019    POTASSIUM 3.6 08/18/2022    POTASSIUM 3.7 08/08/2019    CHLORIDE 105 08/18/2022    CHLORIDE 108 08/08/2019    CO2 28 08/18/2022    CO2 27 08/08/2019    BUN 17 08/18/2022    BUN 15 08/08/2019    CR 0.78 08/18/2022    CR 0.92 08/08/2019     (H) 08/18/2022    GLC 99 08/08/2019     COAGS: No results found for: PTT, INR, FIBR  POC:   Lab Results   Component Value Date    HCG Negative 06/05/2018    HCGS Negative 06/11/2020     HEPATIC:   Lab Results   Component Value Date    ALBUMIN 3.6 07/17/2018    PROTTOTAL 7.0 07/17/2018    ALT 18 07/17/2018    AST 11 07/17/2018    ALKPHOS 52 07/17/2018    BILITOTAL 0.5 07/17/2018     OTHER:   Lab Results   Component Value Date    DAMARI 8.9 08/18/2022    LIPASE 182 07/17/2018    TSH 4.67 (H) 06/07/2018    T4 0.92 06/07/2018       Anesthesia Plan    ASA Status:  1   NPO Status:  NPO Appropriate    Anesthesia Type: MAC.     - Reason for MAC: immobility needed, straight local not clinically adequate   Induction: Intravenous.   Maintenance: TIVA.        Consents    Anesthesia Plan(s) and associated risks, benefits, and realistic alternatives discussed. Questions answered and patient/representative(s) expressed understanding.    - Discussed:     - Discussed with:  Patient      - Extended Intubation/Ventilatory Support Discussed: No.      - Patient is DNR/DNI Status: No    Use of blood products discussed: No .     Postoperative Care    Pain management: IV analgesics, Oral pain medications, Multi-modal analgesia.   PONV prophylaxis: Ondansetron (or other 5HT-3), Background Propofol Infusion     Comments:                Carlos  Jerald Dent MD

## 2023-01-03 NOTE — OP NOTE
OPERATIVE REPORT: 1/3/2023    PREOPERATIVE DIAGNOSES:   1. Stress urinary incontinence.   2. Urethral hypermobility.    POSTOPERATIVE DIAGNOSES: Same + cystocele and rectocele    PROCEDURE PERFORMED:   1. Mid urethral sling ([employer] Solyx).   2. Cystoscopy.     ATTENDING PHYSICIAN: Brendan Catalan MD     RESIDENT SURGEON: Beau Manning MD     ANESTHESIA: Monitored anesthesia care with 10 mL of 0.25% Bupivacaine with epinephrine     ESTIMATED BLOOD LOSS: 10 mL.     FINDINGS:  No mesh in the bladder and negative leak test    SPECIMENS:  None      HISTORY OF PRESENT ILLNESS: Dot Kenny is a 57 year old woman with a history of stress urinary incontinence. Differing management options were discussed with the patient and she has elected to proceed with a mid urethral sling.    DESCRIPTION OF PROCEDURE: After informed consent was obtained, the patient was identified, marked and taken to the operating room in her usual state of health. After induction with monitored anesthesia care, she was positioned in modified lithotomy position. A timeout was performed to ensure proper patient, procedure and positioning. The patient received 1 gram of Ancef prior to initiation of surgery. She was prepped with Betadine. An 18-Occitan Dia catheter was placed.     An Allis clamp was used to grasp the vaginal mucosa approximately 1 cm from the urethral meatus at the mid urethra. We injected 10 mL of 0.25% Bupivacaine with epinephrine to hydrodissect the tissues. We then made a 1-cm incision through the vaginal mucosa with black handled Augustine. We then dissected this laterally out past the fornices of the vagina in the pubocervical fascia, out laterally to the pelvic side wall, first on the right-hand side and then on the left hand side. We then placed the Solyx sling on the trocar and placed this first through the obturator membrane on the left side and then deployed it and then placed it on the right hand side. The  sling lay flat.  The sling was placed under appropriate tension.  We then placed a rigid cystoscope through the urethra and into the bladder, noting bilateral ureteral orifices. The floor of the bladder was intact as well as the bladder neck and the lateral edges, no mesh was seen. The urethra was without tape. We then withdrew the cystoscope with a full bladder and pressed on the suprapubic region and did not note any incontinence from the urethral meatus. At this time the sling on the right side was deployed and  the wound was irrigated.  A running 2-0 Vicryl was then used to reapproximate the mucosa of the vagina. The patient was then returned to the supine position and transported to recovery in stable condition.     ASSESSMENT AND PLAN: Dot Kenny is a 57 year old year-old woman who underwent a midurethral sling for stress urinary incontinence. We will plan to follow up with her in 2 weeks in Urology Clinic. Obtain residual urine and evaluate for obstructive urinary symptoms.     Beau Manning MD  Patient was seen, evaluated and plan was formulated in conjunction with me and I agree with the above.  I was present for the entire procedure.  Brendan Catalan MD

## 2023-01-03 NOTE — DISCHARGE INSTRUCTIONS
[facility]  Home Care Following Anesthesia  For 24 hours after surgery:  Get plenty of rest.  A responsible adult must stay with you for at least 24 hours after you leave the surgery center.  Do not drive or use heavy equipment.  If you have weakness or tingling, don't drive or use heavy equipment until this feeling goes away.   Do not drink alcohol.   Avoid strenuous or risky activities.  Ask for help when climbing stairs.  You may feel lightheaded.  IF so, sit for a few minutes before standing.  Have someone help you get up.   If you have nausea (feel sick to your stomach): Drink only clear liquids such as apple juice, ginger ale, broth or 7-Up.  Rest may also help.  Be sure to drink enough fluids.  Move to a regular diet as you feel able.   You may have a slight fever.  Call the doctor if your fever is over 100 F (37.7 C) (taken under the tongue) or lasts longer than 24 hours.  You may have a dry mouth, a sore throat, muscle aches or trouble sleeping. These should go away after 24 hours.  Do not make important or legal decisions.   It is recommended to avoid smoking.               Tips for taking pain medications  To get the best pain relief possible, remember these points:  Take pain medications as directed, before pain becomes severe.  Pain medication can upset your stomach: taking it with food may help.  Constipation is a common side effect of pain medication. Drink plenty of  fluids.  Eat foods high in fiber. Take a stool softener if recommended by your doctor or pharmacist.  Do not drink alcohol, drive or operate machinery while taking pain medications.  Ask about other ways to control pain, such as with heat, ice or relaxation.    Tylenol/Acetaminophen Consumption  To help encourage the safe use of acetaminophen, the makers of TYLENOL  have lowered the maximum daily dose for single-ingredient Extra Strength TYLENOL  (acetaminophen) products sold in the [place] from 8 pills  per day (4,000 mg) to 6 pills per day (3,000 mg). The dosing interval has also changed from 2 pills every 4-6 hours to 2 pills every 6 hours.  If you feel your pain relief is insufficient, you may take Tylenol/Acetaminophen in addition to your narcotic pain medication.   Be careful not to exceed 3,000 mg of Tylenol/Acetaminophen in a 24 hour period from all sources.  If you are taking extra strength Tylenol/acetaminophen (500 mg), the maximum dose is 6 tablets in 24 hours.  If you are taking regular strength acetaminophen (325 mg), the maximum dose is 9 tablets in 24 hours.  You received 975 mg of Tylenol at 7:40 am.  Your next dose is available after 1:40 pm.  Then follow the directions on the bottle.    Call a doctor for any of the following:  Signs of infection (fever, growing tenderness at the surgery site, a large amount of drainage or bleeding, severe pain, foul-smelling drainage, redness, swelling).  It has been over 8 to 10 hours since surgery and you are still not able to urinate (pass water).  Headache for over 24 hours.  Signs of Covid-19 infection (temperature over 100 degrees, shortness of breath, cough, loss of taste/smell, generalized body aches, persistent headache, chills, sore throat, nausea/vomiting/diarrhea)    Your doctor is:       Dr. Brendan Catalan, Prostate and Urology: [phone]               After Hours and Weekends dial: [phone] and ask for the resident on call for:  Prostate Urology  For emergency care, call the:  [facility]:  [phone] (TTY for hearing impaired: [phone])

## 2023-01-05 ENCOUNTER — TELEPHONE (OUTPATIENT)
Dept: UROLOGY | Facility: CLINIC | Age: 58
End: 2023-01-05

## 2023-01-05 NOTE — TELEPHONE ENCOUNTER
[id number]- left voicemail for patient to switch to [place]'s schedule on the same day.    [facility] Visit Facilitator- Surgical Specialties

## 2023-01-17 ENCOUNTER — DOCUMENTATION ONLY (OUTPATIENT)
Dept: OTHER | Facility: CLINIC | Age: 58
End: 2023-01-17

## 2023-01-23 ENCOUNTER — OFFICE VISIT (OUTPATIENT)
Dept: UROLOGY | Facility: CLINIC | Age: 58
End: 2023-01-23
Payer: COMMERCIAL

## 2023-01-23 DIAGNOSIS — N95.2 ATROPHIC VAGINITIS: ICD-10-CM

## 2023-01-23 DIAGNOSIS — N39.3 STRESS INCONTINENCE: Primary | ICD-10-CM

## 2023-01-23 PROCEDURE — 99207 PR MEASURE POST-VOID RESIDUAL URINE/BLADDER CAPACITY, US NON-IMAGING: CPT | Performed by: UROLOGY

## 2023-01-23 PROCEDURE — 99024 POSTOP FOLLOW-UP VISIT: CPT | Performed by: UROLOGY

## 2023-01-23 NOTE — NURSING NOTE
Dot Kenny's goals for this visit include:   Chief Complaint   Patient presents with     RECHECK     Post op- 2 week        She requests these members of her care team be copied on today's visit information:       PCP: Mayito Dumont    Referring Provider:  Brendan aCtalan MD  [address]  [place] [address]    post void residual: 69 ml    Do you need any medication refills at today's visit?     Lata Rosales LPN on 1/23/2023 at 9:05 AM

## 2023-01-23 NOTE — PROGRESS NOTES
Reason for Visit: 2 week post op for A midurethral sling on 1/3/23    Clinical Data: Ms. Dot Kenny  is a 57 year old year old   female with a history of the above.   She returns today for her post operative visit.  She states that she is doing well.  No stress incontinence, minimal urinary urgency no urge incontinence.    On exam:  Residual mild urethral mobility  Vaginal incision with suture  No evidence of mesh extrusion or erosion    RU: 69 cc on bladder scan by nursing.    A/P s/p midurethral sling doing well  -f/u in 3 weeks virtually  -continue restrictions of lifting and sexual activity      Thank you for allowing me to participate in the care of  Ms.Sara KARTHIKEYAN Kenny   and I will keep you updated on her progress.    Brendan Catalan MD

## 2023-02-13 ENCOUNTER — VIRTUAL VISIT (OUTPATIENT)
Dept: UROLOGY | Facility: CLINIC | Age: 58
End: 2023-02-13
Payer: COMMERCIAL

## 2023-02-13 DIAGNOSIS — N95.2 ATROPHIC VAGINITIS: Primary | ICD-10-CM

## 2023-02-13 PROCEDURE — 99024 POSTOP FOLLOW-UP VISIT: CPT | Mod: VID | Performed by: UROLOGY

## 2023-02-13 RX ORDER — ESTRADIOL 0.1 MG/G
2 CREAM VAGINAL
Qty: 42.5 G | Refills: 11 | Status: SHIPPED | OUTPATIENT
Start: 2023-02-13 | End: 2024-04-10

## 2023-02-13 NOTE — NURSING NOTE
Is the patient currently in the state of [place]? YES    Visit mode:VIDEO    If the visit is dropped, the patient can be reconnected by: VIDEO VISIT: Text to cell phone: [phone]    Will anyone else be joining the visit? NO      How would you like to obtain your AVS? MyChart    Are changes needed to the allergy or medication list? NO    Comments or concerns regarding today's visit:

## 2023-02-13 NOTE — PROGRESS NOTES
Video-Visit Details    Type of service:  Video Visit    Originating Location (pt. Location): Home        Distant Location (provider location):  On-site    Mode of Communication:  Video Conference via [employer]

## 2023-02-13 NOTE — PROGRESS NOTES
Reason for Visit: 6 week post op for A midurethral sling on 1/3/23    Clinical Data: Ms. Dot Kenny  is a 57 year old year old   female with a history of the above.   She returns today for her post operative visit.  She states that she is doing well.  No stress incontinence, minimal urinary urgency no urge incontinence.    On previous exam:  Residual mild urethral mobility  Vaginal incision with suture  No evidence of mesh extrusion or erosion    A/P s/p midurethral sling doing well.  We discussed starting estrogen cream  -discontinue restrictions of lifting and sexual activity  -estrogen cream, indefinitely.   -f/u prn      Thank you for allowing me to participate in the care of  Ms.Sara KARTHIKEYAN Kenny   and I will keep you updated on her progress.    Brendan Catalan MD

## 2023-02-13 NOTE — PATIENT INSTRUCTIONS
-discontinue restrictions of lifting and sexual activity  -estrogen cream, indefinitely.   -f/u prn

## 2023-04-27 ENCOUNTER — NURSE TRIAGE (OUTPATIENT)
Dept: NURSING | Facility: CLINIC | Age: 58
End: 2023-04-27

## 2023-04-27 ENCOUNTER — OFFICE VISIT (OUTPATIENT)
Dept: FAMILY MEDICINE | Facility: CLINIC | Age: 58
End: 2023-04-27
Payer: COMMERCIAL

## 2023-04-27 VITALS
DIASTOLIC BLOOD PRESSURE: 60 MMHG | BODY MASS INDEX: 26.98 KG/M2 | OXYGEN SATURATION: 98 % | WEIGHT: 158 LBS | RESPIRATION RATE: 16 BRPM | HEART RATE: 69 BPM | TEMPERATURE: 97.2 F | HEIGHT: 64 IN | SYSTOLIC BLOOD PRESSURE: 98 MMHG

## 2023-04-27 DIAGNOSIS — S40.861A TICK BITE OF RIGHT UPPER ARM, INITIAL ENCOUNTER: Primary | ICD-10-CM
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
## 2023-04-27 PROCEDURE — 99213 OFFICE O/P EST LOW 20 MIN: CPT | Performed by: NURSE PRACTITIONER

## 2023-04-27 RX ORDER — DOXYCYCLINE 100 MG/1
200 CAPSULE ORAL ONCE
Qty: 2 CAPSULE | Refills: 0 | Status: SHIPPED | OUTPATIENT
Start: 2023-04-27 | End: 2023-04-27

## 2023-04-27 ASSESSMENT — PAIN SCALES - GENERAL: PAINLEVEL: MILD PAIN (3)

## 2023-04-27 NOTE — PATIENT INSTRUCTIONS
One dose of doxycycline.  Monitor for symptoms such as fever, rash joint pain and let us know if those occur.

## 2023-04-27 NOTE — TELEPHONE ENCOUNTER
"Pt reports tick bite.  \"Must have occurred yesterday.\"  Within the past 18 hours.  Inside of arm.    Arm felt \"sore\" today at the bite site.  Soreness is what captured pt's attention.  \"Thought it was a blood blister at first.\"  Then saw it was a tick \"stuck to the arm.\"  Not embedded.  \"Easily pulled it out with fingers.\"    \"Arm still feels 'sore' at the site.\"  \"Now just looks like a hole with a bruise.\"  Tick is \"Size of a pinhead.\"  Pt has saved in plastic bag.  Advised refrigerating and bring to clinic.    Pt would like clinic eval today to discuss possible Lyme prophylaxis.  Warm transferred to a  for an appointment now.    [facility] Nurse Advisor     Reason for Disposition    Patient wants to be seen    Additional Information    Negative: Not a tick bite    Negative: Patient sounds very sick or weak to the triager    Negative: Fever or severe headache occurs, 2 to 14 days following the bite    Negative: Widespread rash occurs, 2 to 14 days following the bite    Negative: Can't remove live tick (after using Care Advice)    Negative: Fever and spreading red area or streak    Negative: Fever and area is very tender to touch    Negative: Red streak or red line and length > 2 inches (5 cm)    Negative: Red or very tender (to touch) area and started over 24 hours after the bite    Negative: Red ring or bull's-eye rash occurs around a deer tick bite    Negative: Probable deer tick that was attached > 36 hours (or tick appears swollen, not flat)    Protocols used: TICK BITE-A-OH      "

## 2023-04-27 NOTE — PROGRESS NOTES
"  Assessment & Plan     Tick bite of right upper arm, initial encounter  Brings tick to appointment, it is identified as nymphal deer tick. Unsure how long it was attached, removed from right upper arm this morning. Discussed risk/benefits of prophylactic doxy, she would like to proceed. Reviewed s/s of Lyme, she will monitor for symptoms and contact clinic if they occur.  - doxycycline hyclate (VIBRAMYCIN) 100 MG capsule; Take 2 capsules (200 mg) by mouth once for 1 dose       BMI:   Estimated body mass index is 27.12 kg/m  as calculated from the following:    Height as of this encounter: 1.626 m (5' 4\").    Weight as of this encounter: 71.7 kg (158 lb).       Patient Instructions   One dose of doxycycline.  Monitor for symptoms such as fever, rash joint pain and let us know if those occur.      DENAE [facility]    Lisa Chris is a 57 year old, presenting for the following health issues:  Tick Bite        4/27/2023     2:46 PM   Additional Questions   Roomed by Renetta DE LA CRUZ   Accompanied by self     History of Present Illness       Reason for visit:  Tick bite  Symptom onset:  Today  Symptoms include:  Redness and soreness  Symptom intensity:  Moderate  Symptom progression:  Staying the same  Had these symptoms before:  No  What makes it worse:  No  What makes it better:  No    She eats 2-3 servings of fruits and vegetables daily.She consumes 0 sweetened beverage(s) daily.She exercises with enough effort to increase her heart rate 9 or less minutes per day.  She exercises with enough effort to increase her heart rate 3 or less days per week.   She is taking medications regularly.               Review of Systems   Constitutional, HEENT, cardiovascular, pulmonary, gi and gu systems are negative, except as otherwise noted.      Objective    BP 98/60   Pulse 69   Temp 97.2  F (36.2  C) (Tympanic)   Resp 16   Ht 1.626 m (5' 4\")   Wt 71.7 kg (158 lb)   SpO2 98%   BMI " 27.12 kg/m    Body mass index is 27.12 kg/m .  Physical Exam  Vitals and nursing note reviewed.   Constitutional:       General: She is not in acute distress.     Appearance: Normal appearance.   HENT:      Head: Normocephalic and atraumatic.      Mouth/Throat:      Mouth: Mucous membranes are moist.   Cardiovascular:      Rate and Rhythm: Normal rate.   Pulmonary:      Effort: Pulmonary effort is normal.   Musculoskeletal:      Cervical back: Neck supple.   Skin:     General: Skin is warm and dry.      Comments: 2cm raised round erythematous lesion to right upper arm. No crusting, drainage or discharge.   Neurological:      General: No focal deficit present.      Mental Status: She is alert.   Psychiatric:         Mood and Affect: Mood normal.         Behavior: Behavior normal.

## 2023-06-27 ENCOUNTER — OFFICE VISIT (OUTPATIENT)
Dept: DERMATOLOGY | Facility: CLINIC | Age: 58
End: 2023-06-27
Payer: COMMERCIAL

## 2023-06-27 DIAGNOSIS — D22.9 MULTIPLE BENIGN NEVI: ICD-10-CM

## 2023-06-27 DIAGNOSIS — L82.1 SEBORRHEIC KERATOSIS: Primary | ICD-10-CM

## 2023-06-27 DIAGNOSIS — D18.01 CHERRY ANGIOMA: ICD-10-CM

## 2023-06-27 DIAGNOSIS — L81.4 LENTIGO: ICD-10-CM

## 2023-06-27 PROCEDURE — 99213 OFFICE O/P EST LOW 20 MIN: CPT | Performed by: PHYSICIAN ASSISTANT

## 2023-06-27 ASSESSMENT — PAIN SCALES - GENERAL: PAINLEVEL: NO PAIN (0)

## 2023-06-27 NOTE — LETTER
6/27/2023         RE: Dot Kenny  [address] [id number]        Dear Colleague,    Thank you for referring your patient, Dot Kenny, to the [facility]. Please see a copy of my visit note below.    Dot Kenny is an extremely pleasant 58 year old year old female patient here today for skin check. She denies any changing nevi. No painful or bleeding skin lesions. She is going to [place] and [place] in March.  Patient has no other skin complaints today.  Remainder of the HPI, Meds, PMH, Allergies, FH, and SH was reviewed in chart.    Pertinent Hx:   No personal history of skin cancer. Family history of BCC  Past Medical History:   Diagnosis Date     ASCUS of cervix with negative high risk HPV 03/2016    Neg HPV       Past Surgical History:   Procedure Laterality Date     BIOPSY BREAST       BUNIONECTOMY Right 6/14/2016    Procedure: BUNIONECTOMY;  Surgeon: Jean Carlos Coleman DPM;  Location: [facility] OR     CYSTOSCOPY, SLING TRANSVAGINAL N/A 1/3/2023    Procedure: CREATION, VAGINAL SLING, WITH CYSTOSCOPY (Support the urethra with mesh sling and look in the bladder);  Surgeon: Brendan Catalan MD;  Location: [facility]     REMOVE HARDWARE FOOT Right 6/5/2018    Procedure: REMOVE HARDWARE FOOT;  Removal of Hardware Right Foot;  Surgeon: Jean Carlos Coleman DPM;  Location: [facility] OR     [facility] APPENDECTOMY  2000    appendectomy         Family History   Problem Relation Age of Onset     Gastrointestinal Disease Mother         Diverticulitis     Basal cell carcinoma Mother      Hypertension Father      Thyroid Disease Paternal Grandfather      Diabetes No family hx of      Breast Cancer No family hx of      Cancer - colorectal No family hx of      Alcohol/Drug No family hx of      Lipids No family hx of        Social History     Socioeconomic History     Marital status:      Spouse name: Not on file     Number of children: Not on file     Years of education: Not on file      Highest education level: Not on file   Occupational History     Not on file   Tobacco Use     Smoking status: Never     Smokeless tobacco: Never   Vaping Use     Vaping Use: Never used   Substance and Sexual Activity     Alcohol use: No     Drug use: No     Sexual activity: Yes     Partners: Male     Birth control/protection: Male Surgical     Comment:  vasectomy   Other Topics Concern     Parent/sibling w/ CABG, MI or angioplasty before 65F 55M? No      Service No     Blood Transfusions No     Caffeine Concern No     Occupational Exposure No     Hobby Hazards No     Sleep Concern No     Stress Concern No     Weight Concern No     Special Diet No     Back Care No     Exercise No     Bike Helmet Not Asked     Seat Belt Yes     Self-Exams Yes   Social History Narrative     Not on file     Social Determinants of Health     Financial Resource Strain: Not on file   Food Insecurity: Not on file   Transportation Needs: Not on file   Physical Activity: Not on file   Stress: Not on file   Social Connections: Not on file   Intimate Partner Violence: Not on file   Housing Stability: Not on file       Outpatient Encounter Medications as of 6/27/2023   Medication Sig Dispense Refill     estradiol (ESTRACE) 0.1 MG/GM vaginal cream Place 2 g vaginally twice a week 42.5 g 11     venlafaxine (EFFEXOR XR) 75 MG 24 hr capsule Take 1 capsule (75 mg) by mouth daily 90 capsule 2     No facility-administered encounter medications on file as of 6/27/2023.             O:   NAD, WDWN, Alert & Oriented, Mood & Affect wnl, Vitals stable   Here today alone   There were no vitals taken for this visit.   General appearance normal   Vitals stable   Alert, oriented and in no acute distress     Stuck on papules and brown macules on trunk and ext   Red papules on trunk  Skin colored papules on back      The remainder of skin exam is normal       Eyes: Conjunctivae/lids:Normal     ENT: Lips: normal    MSK:Normal    Cardiovascular:  peripheral edema none    Pulm: Breathing Normal    Neuro/Psych: Orientation:Alert and Orientedx3 ; Mood/Affect:normal   A/P:  1. Seborrheic keratosis, lentigo, angioma, dermal nevi   It was a pleasure speaking to Dot Kenny today.  BENIGN LESIONS DISCUSSED WITH PATIENT:  I discussed the specifics of tumor, prognosis, and genetics of benign lesions.  I explained that treatment of these lesions would be purely cosmetic and not medically neccessary.  I discussed with patient different removal options including excision, cautery and /or laser.      Nature and genetics of benign skin lesions dicussed with patient.  Signs and Symptoms of skin cancer discussed with patient.  ABCDEs of melanoma reviewed with patient.  Patient encouraged to perform monthly skin exams.  UV precautions reviewed with patient.  Risks of non-melanoma skin cancer discussed with patient   Return to clinic in one year or sooner if needed.      Again, thank you for allowing me to participate in the care of your patient.        Sincerely,        Antoinette Peñaloza PA-C

## 2023-06-27 NOTE — NURSING NOTE
Chief Complaint   Patient presents with     Skin Check     No Concerns        There were no vitals filed for this visit.  Wt Readings from Last 1 Encounters:   04/27/23 71.7 kg (158 lb)       Daniela Morgan LPN .................6/27/2023     negative...

## 2023-06-27 NOTE — PROGRESS NOTES
Dot Kenny is an extremely pleasant 58 year old year old female patient here today for skin check. She denies any changing nevi. No painful or bleeding skin lesions. She is going to [place] and [place] in March.  Patient has no other skin complaints today.  Remainder of the HPI, Meds, PMH, Allergies, FH, and SH was reviewed in chart.    Pertinent Hx:   No personal history of skin cancer. Family history of BCC  Past Medical History:   Diagnosis Date     ASCUS of cervix with negative high risk HPV 03/2016    Neg HPV       Past Surgical History:   Procedure Laterality Date     BIOPSY BREAST       BUNIONECTOMY Right 6/14/2016    Procedure: BUNIONECTOMY;  Surgeon: Jean Carlos Coleman DPM;  Location: [facility] OR     CYSTOSCOPY, SLING TRANSVAGINAL N/A 1/3/2023    Procedure: CREATION, VAGINAL SLING, WITH CYSTOSCOPY (Support the urethra with mesh sling and look in the bladder);  Surgeon: Brendan Catalan MD;  Location: [facility] OR     REMOVE HARDWARE FOOT Right 6/5/2018    Procedure: REMOVE HARDWARE FOOT;  Removal of Hardware Right Foot;  Surgeon: Jean Carlos Coleman DPM;  Location: [facility] OR     [facility] APPENDECTOMY  2000    appendectomy         Family History   Problem Relation Age of Onset     Gastrointestinal Disease Mother         Diverticulitis     Basal cell carcinoma Mother      Hypertension Father      Thyroid Disease Paternal Grandfather      Diabetes No family hx of      Breast Cancer No family hx of      Cancer - colorectal No family hx of      Alcohol/Drug No family hx of      Lipids No family hx of        Social History     Socioeconomic History     Marital status:      Spouse name: Not on file     Number of children: Not on file     Years of education: Not on file     Highest education level: Not on file   Occupational History     Not on file   Tobacco Use     Smoking status: Never     Smokeless tobacco: Never   Vaping Use     Vaping Use: Never used   Substance and Sexual Activity     Alcohol use: No     Drug use:  No     Sexual activity: Yes     Partners: Male     Birth control/protection: Male Surgical     Comment:  vasectomy   Other Topics Concern     Parent/sibling w/ CABG, MI or angioplasty before 65F 55M? No      Service No     Blood Transfusions No     Caffeine Concern No     Occupational Exposure No     Hobby Hazards No     Sleep Concern No     Stress Concern No     Weight Concern No     Special Diet No     Back Care No     Exercise No     Bike Helmet Not Asked     Seat Belt Yes     Self-Exams Yes   Social History Narrative     Not on file     Social Determinants of Health     Financial Resource Strain: Not on file   Food Insecurity: Not on file   Transportation Needs: Not on file   Physical Activity: Not on file   Stress: Not on file   Social Connections: Not on file   Intimate Partner Violence: Not on file   Housing Stability: Not on file       Outpatient Encounter Medications as of 6/27/2023   Medication Sig Dispense Refill     estradiol (ESTRACE) 0.1 MG/GM vaginal cream Place 2 g vaginally twice a week 42.5 g 11     venlafaxine (EFFEXOR XR) 75 MG 24 hr capsule Take 1 capsule (75 mg) by mouth daily 90 capsule 2     No facility-administered encounter medications on file as of 6/27/2023.             O:   NAD, WDWN, Alert & Oriented, Mood & Affect wnl, Vitals stable   Here today alone   There were no vitals taken for this visit.   General appearance normal   Vitals stable   Alert, oriented and in no acute distress     Stuck on papules and brown macules on trunk and ext   Red papules on trunk  Skin colored papules on back      The remainder of skin exam is normal       Eyes: Conjunctivae/lids:Normal     ENT: Lips: normal    MSK:Normal    Cardiovascular: peripheral edema none    Pulm: Breathing Normal    Neuro/Psych: Orientation:Alert and Orientedx3 ; Mood/Affect:normal   A/P:  1. Seborrheic keratosis, lentigo, angioma, dermal nevi   It was a pleasure speaking to Dot Kenny today.  BENIGN LESIONS  DISCUSSED WITH PATIENT:  I discussed the specifics of tumor, prognosis, and genetics of benign lesions.  I explained that treatment of these lesions would be purely cosmetic and not medically neccessary.  I discussed with patient different removal options including excision, cautery and /or laser.      Nature and genetics of benign skin lesions dicussed with patient.  Signs and Symptoms of skin cancer discussed with patient.  ABCDEs of melanoma reviewed with patient.  Patient encouraged to perform monthly skin exams.  UV precautions reviewed with patient.  Risks of non-melanoma skin cancer discussed with patient   Return to clinic in one year or sooner if needed.

## 2023-07-12 ENCOUNTER — PATIENT OUTREACH (OUTPATIENT)
Dept: CARE COORDINATION | Facility: CLINIC | Age: 58
End: 2023-07-12
Payer: COMMERCIAL

## 2023-07-17 DIAGNOSIS — N95.1 MENOPAUSAL FLUSHING: ICD-10-CM

## 2023-07-17 RX ORDER — VENLAFAXINE HYDROCHLORIDE 75 MG/1
CAPSULE, EXTENDED RELEASE ORAL
Qty: 90 CAPSULE | Refills: 0 | Status: SHIPPED | OUTPATIENT
Start: 2023-07-17 | End: 2024-04-10 | Stop reason: DRUGHIGH

## 2023-08-07 ASSESSMENT — ENCOUNTER SYMPTOMS
EYE PAIN: 0
JOINT SWELLING: 0
CONSTIPATION: 0
CHILLS: 0
SHORTNESS OF BREATH: 0
PALPITATIONS: 0
COUGH: 0
WEAKNESS: 0
HEMATOCHEZIA: 0
DIARRHEA: 0
NAUSEA: 0
BREAST MASS: 0
DYSURIA: 0
MYALGIAS: 0
HEADACHES: 0
HEARTBURN: 0
FEVER: 0
FREQUENCY: 0
PARESTHESIAS: 0
ARTHRALGIAS: 0
HEMATURIA: 0
SORE THROAT: 0
ABDOMINAL PAIN: 0
NERVOUS/ANXIOUS: 0
DIZZINESS: 0

## 2023-08-09 ENCOUNTER — PATIENT OUTREACH (OUTPATIENT)
Dept: CARE COORDINATION | Facility: CLINIC | Age: 58
End: 2023-08-09
Payer: COMMERCIAL

## 2023-08-10 ENCOUNTER — OFFICE VISIT (OUTPATIENT)
Dept: FAMILY MEDICINE | Facility: CLINIC | Age: 58
End: 2023-08-10
Payer: COMMERCIAL

## 2023-08-10 ENCOUNTER — LAB (OUTPATIENT)
Dept: LAB | Facility: CLINIC | Age: 58
End: 2023-08-10
Payer: COMMERCIAL

## 2023-08-10 VITALS
TEMPERATURE: 97.7 F | BODY MASS INDEX: 29.57 KG/M2 | RESPIRATION RATE: 16 BRPM | HEART RATE: 64 BPM | OXYGEN SATURATION: 97 % | HEIGHT: 64 IN | SYSTOLIC BLOOD PRESSURE: 102 MMHG | DIASTOLIC BLOOD PRESSURE: 76 MMHG | WEIGHT: 173.2 LBS

## 2023-08-10 DIAGNOSIS — Z00.00 ROUTINE GENERAL MEDICAL EXAMINATION AT A HEALTH CARE FACILITY: Primary | ICD-10-CM

## 2023-08-10 DIAGNOSIS — R53.83 OTHER FATIGUE: ICD-10-CM

## 2023-08-10 DIAGNOSIS — N95.1 MENOPAUSAL FLUSHING: ICD-10-CM

## 2023-08-10 LAB
ALBUMIN SERPL BCG-MCNC: 4.2 G/DL (ref 3.5–5.2)
ALP SERPL-CCNC: 71 U/L (ref 35–104)
ALT SERPL W P-5'-P-CCNC: 17 U/L (ref 0–50)
ANION GAP SERPL CALCULATED.3IONS-SCNC: 12 MMOL/L (ref 7–15)
AST SERPL W P-5'-P-CCNC: 22 U/L (ref 0–45)
BASOPHILS # BLD AUTO: 0 10E3/UL (ref 0–0.2)
BASOPHILS NFR BLD AUTO: 1 %
BILIRUB SERPL-MCNC: 0.2 MG/DL
BUN SERPL-MCNC: 18.7 MG/DL (ref 6–20)
CALCIUM SERPL-MCNC: 9.7 MG/DL (ref 8.6–10)
CHLORIDE SERPL-SCNC: 103 MMOL/L (ref 98–107)
CREAT SERPL-MCNC: 0.83 MG/DL (ref 0.51–0.95)
DEPRECATED HCO3 PLAS-SCNC: 24 MMOL/L (ref 22–29)
EOSINOPHIL # BLD AUTO: 0.1 10E3/UL (ref 0–0.7)
EOSINOPHIL NFR BLD AUTO: 3 %
ERYTHROCYTE [DISTWIDTH] IN BLOOD BY AUTOMATED COUNT: 12.9 % (ref 10–15)
GFR SERPL CREATININE-BSD FRML MDRD: 82 ML/MIN/1.73M2
GLUCOSE SERPL-MCNC: 99 MG/DL (ref 70–99)
HCT VFR BLD AUTO: 37.4 % (ref 35–47)
HGB BLD-MCNC: 12.5 G/DL (ref 11.7–15.7)
IMM GRANULOCYTES # BLD: 0 10E3/UL
IMM GRANULOCYTES NFR BLD: 0 %
LYMPHOCYTES # BLD AUTO: 1.5 10E3/UL (ref 0.8–5.3)
LYMPHOCYTES NFR BLD AUTO: 30 %
MCH RBC QN AUTO: 30.9 PG (ref 26.5–33)
MCHC RBC AUTO-ENTMCNC: 33.4 G/DL (ref 31.5–36.5)
MCV RBC AUTO: 92 FL (ref 78–100)
MONOCYTES # BLD AUTO: 0.5 10E3/UL (ref 0–1.3)
MONOCYTES NFR BLD AUTO: 9 %
NEUTROPHILS # BLD AUTO: 2.9 10E3/UL (ref 1.6–8.3)
NEUTROPHILS NFR BLD AUTO: 57 %
PLATELET # BLD AUTO: 237 10E3/UL (ref 150–450)
POTASSIUM SERPL-SCNC: 4 MMOL/L (ref 3.4–5.3)
PROT SERPL-MCNC: 6.9 G/DL (ref 6.4–8.3)
RBC # BLD AUTO: 4.05 10E6/UL (ref 3.8–5.2)
SODIUM SERPL-SCNC: 139 MMOL/L (ref 136–145)
TSH SERPL DL<=0.005 MIU/L-ACNC: 3.82 UIU/ML (ref 0.3–4.2)
WBC # BLD AUTO: 5 10E3/UL (ref 4–11)

## 2023-08-10 PROCEDURE — 99214 OFFICE O/P EST MOD 30 MIN: CPT | Mod: 25 | Performed by: FAMILY MEDICINE

## 2023-08-10 PROCEDURE — 84443 ASSAY THYROID STIM HORMONE: CPT

## 2023-08-10 PROCEDURE — 36415 COLL VENOUS BLD VENIPUNCTURE: CPT

## 2023-08-10 PROCEDURE — 80053 COMPREHEN METABOLIC PANEL: CPT

## 2023-08-10 PROCEDURE — 99396 PREV VISIT EST AGE 40-64: CPT | Mod: 25 | Performed by: FAMILY MEDICINE

## 2023-08-10 PROCEDURE — 91313 COVID-19 BIVALENT 18+ (MODERNA): CPT | Performed by: FAMILY MEDICINE

## 2023-08-10 PROCEDURE — 0134A COVID-19 BIVALENT 18+ (MODERNA): CPT | Performed by: FAMILY MEDICINE

## 2023-08-10 PROCEDURE — 85025 COMPLETE CBC W/AUTO DIFF WBC: CPT

## 2023-08-10 RX ORDER — VENLAFAXINE HYDROCHLORIDE 150 MG/1
150 CAPSULE, EXTENDED RELEASE ORAL DAILY
Qty: 90 CAPSULE | Refills: 3 | Status: SHIPPED | OUTPATIENT
Start: 2023-08-10 | End: 2024-08-15

## 2023-08-10 RX ORDER — VENLAFAXINE HYDROCHLORIDE 75 MG/1
75 CAPSULE, EXTENDED RELEASE ORAL DAILY
Qty: 90 CAPSULE | Refills: 0 | Status: CANCELLED | OUTPATIENT
Start: 2023-08-10

## 2023-08-10 ASSESSMENT — ENCOUNTER SYMPTOMS
NERVOUS/ANXIOUS: 0
MYALGIAS: 0
DIZZINESS: 0
ARTHRALGIAS: 0
DIARRHEA: 0
NAUSEA: 0
PARESTHESIAS: 0
DYSURIA: 0
WEAKNESS: 0
HEMATOCHEZIA: 0
COUGH: 0
CHILLS: 0
BREAST MASS: 0
HEADACHES: 0
CONSTIPATION: 0
HEMATURIA: 0
FREQUENCY: 0
PALPITATIONS: 0
SHORTNESS OF BREATH: 0
JOINT SWELLING: 0
FEVER: 0
ABDOMINAL PAIN: 0
SORE THROAT: 0
HEARTBURN: 0
EYE PAIN: 0

## 2023-08-10 ASSESSMENT — PAIN SCALES - GENERAL: PAINLEVEL: NO PAIN (0)
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
## 2023-08-15 ENCOUNTER — HOSPITAL ENCOUNTER (OUTPATIENT)
Dept: MAMMOGRAPHY | Facility: CLINIC | Age: 58
Discharge: HOME | End: 2023-08-15
Attending: FAMILY MEDICINE | Admitting: FAMILY MEDICINE
Payer: COMMERCIAL

## 2023-08-15 DIAGNOSIS — Z12.31 VISIT FOR SCREENING MAMMOGRAM: ICD-10-CM

## 2023-08-15 PROCEDURE — 77067 SCR MAMMO BI INCL CAD: CPT

## 2023-11-20 NOTE — PATIENT INSTRUCTIONS
Pharyngitis: Strep (Confirmed)    You have had a positive test for strep throat. Strep throat is a contagious illness. It is spread by coughing, kissing or by touching others after touching your mouth or nose. Symptoms include throat pain that is worse with swallowing, aching all over, headache, and fever. It is treated with antibiotic medicine. This should help you start to feel better in 1 to 2 days.  Home care    Rest at home. Drink plenty of fluids to you won't get dehydrated.    No work or school for the first 2 days of taking the antibiotics. After this time, you will not be contagious. You can then return to school or work if you are feeling better.     Take antibiotic medicine for the full 10 days, even if you feel better. This is very important to ensure the infection is treated. It is also important to prevent medicine-resistant germs from developing. If you were given an antibiotic shot, you don't need any more antibiotics.    You may use acetaminophen or ibuprofen to control pain or fever, unless another medicine was prescribed for this. Talk with your doctor before taking these medicines if you have chronic liver or kidney disease. Also talk with your doctor if you have had a stomach ulcer or GI bleeding.    Throat lozenges or sprays help reduce pain. Gargling with warm saltwater will also reduce throat pain. Dissolve 1/2 teaspoon of salt in 1 glass of warm water. This may be useful just before meals.     Soft foods are OK. Avoid salty or spicy foods.  Follow-up care  Follow up with your healthcare provider or our staff if you don't get better over the next week.  When to seek medical advice  Call your healthcare provider right away if any of these occur:    Fever of 100.4 F (38 C) or higher, or as directed by your healthcare provider    New or worsening ear pain, sinus pain, or headache    Painful lumps in the back of neck    Stiff neck    Lymph nodes getting larger or becoming soft in the  middle    You can't swallow liquids or you can't open your mouth wide because of throat pain    Signs of dehydration. These include very dark urine or no urine, sunken eyes, and dizziness.    Trouble breathing or noisy breathing    Muffled voice    Rash  Date Last Reviewed: 4/13/2015 2000-2017 [employer]. [address] [place] [address], [place] [address]. All rights reserved. This information is not intended as a substitute for professional medical care. Always follow your healthcare professional's instructions.         Olanzapine Counseling- I discussed with the patient the common side effects of olanzapine including but are not limited to: lack of energy, dry mouth, increased appetite, sleepiness, tremor, constipation, dizziness, changes in behavior, or restlessness.  Explained that teenagers are more likely to experience headaches, abdominal pain, pain in the arms or legs, tiredness, and sleepiness.  Serious side effects include but are not limited: increased risk of death in elderly patients who are confused, have memory loss, or dementia-related psychosis; hyperglycemia; increased cholesterol and triglycerides; and weight gain.

## 2023-12-11 ENCOUNTER — OFFICE VISIT (OUTPATIENT)
Dept: URGENT CARE | Facility: URGENT CARE | Age: 58
End: 2023-12-11
Payer: COMMERCIAL

## 2023-12-11 ENCOUNTER — ANCILLARY PROCEDURE (OUTPATIENT)
Dept: GENERAL RADIOLOGY | Facility: CLINIC | Age: 58
End: 2023-12-11
Attending: PHYSICIAN ASSISTANT
Payer: COMMERCIAL

## 2023-12-11 VITALS
BODY MASS INDEX: 28.32 KG/M2 | RESPIRATION RATE: 16 BRPM | WEIGHT: 165 LBS | DIASTOLIC BLOOD PRESSURE: 74 MMHG | SYSTOLIC BLOOD PRESSURE: 124 MMHG | HEART RATE: 91 BPM | OXYGEN SATURATION: 97 % | TEMPERATURE: 98.3 F

## 2023-12-11 DIAGNOSIS — R05.2 SUBACUTE COUGH: Primary | ICD-10-CM

## 2023-12-11 DIAGNOSIS — R05.2 SUBACUTE COUGH: ICD-10-CM

## 2023-12-11 PROCEDURE — 71046 X-RAY EXAM CHEST 2 VIEWS: CPT | Mod: TC | Performed by: RADIOLOGY

## 2023-12-11 PROCEDURE — 99214 OFFICE O/P EST MOD 30 MIN: CPT | Performed by: PHYSICIAN ASSISTANT

## 2023-12-11 RX ORDER — ALBUTEROL SULFATE 90 UG/1
AEROSOL, METERED RESPIRATORY (INHALATION)
Qty: 18 G | Refills: 0 | Status: SHIPPED | OUTPATIENT
Start: 2023-12-11 | End: 2024-04-10

## 2023-12-11 RX ORDER — PREDNISONE 20 MG/1
40 TABLET ORAL DAILY
Qty: 10 TABLET | Refills: 0 | Status: SHIPPED | OUTPATIENT
Start: 2023-12-11 | End: 2023-12-16

## 2023-12-11 NOTE — PROGRESS NOTES
Assessment & Plan     Subacute cough  Reassurance, chest x-ray is clear. Will treat with prednisone burst and albuterol every 4-6 hrs as needed. Continue with supportive care. Return to clinic if symptoms worsen or do not improve; otherwise follow up as needed       - XR Chest 2 Views; Future  - predniSONE (DELTASONE) 20 MG tablet; Take 2 tablets (40 mg) by mouth daily for 5 days  - albuterol (PROAIR HFA/PROVENTIL HFA/VENTOLIN HFA) 108 (90 Base) MCG/ACT inhaler; Inhale 2 puffs every 4-6 hours as needed for cough, wheezing, or shortness of breath                 Return in about 1 week (around 12/18/2023), or if symptoms worsen or fail to improve.    ANTELMO Rangel [facility]            Subjective   Chief Complaint   Patient presents with    Cough     Cough and chest  congestion for about month and a half.  At home covid test negative last night       HPI     URI Adult    Onset of symptoms was 1.5 month(s) ago.  Course of illness is waxing and waning.    Severity moderate  Current and Associated symptoms: cough, tightness, wheezing   Treatment measures tried include OTC Cough med.  Predisposing factors include None.                  Review of Systems         Objective    /74   Pulse 91   Temp 98.3  F (36.8  C) (Tympanic)   Resp 16   Wt 74.8 kg (165 lb)   SpO2 97%   BMI 28.32 kg/m    Body mass index is 28.32 kg/m .  Physical Exam  Constitutional:       Appearance: She is well-developed.   HENT:      Head: Normocephalic.      Right Ear: Tympanic membrane and ear canal normal.      Left Ear: Tympanic membrane and ear canal normal.   Eyes:      Conjunctiva/sclera: Conjunctivae normal.   Cardiovascular:      Rate and Rhythm: Normal rate.      Heart sounds: Normal heart sounds.   Pulmonary:      Effort: Pulmonary effort is normal.      Breath sounds: Normal breath sounds.   Skin:     General: Skin is warm and dry.      Findings: No rash.   Psychiatric:         Behavior:  Behavior normal.            CXR - Reviewed and interpreted by me: no acute infiltrates

## 2023-12-23 ENCOUNTER — E-VISIT (OUTPATIENT)
Dept: URGENT CARE | Facility: CLINIC | Age: 58
End: 2023-12-23
Payer: COMMERCIAL

## 2023-12-23 DIAGNOSIS — N39.0 ACUTE UTI (URINARY TRACT INFECTION): Primary | ICD-10-CM

## 2023-12-23 PROCEDURE — 99421 OL DIG E/M SVC 5-10 MIN: CPT | Performed by: NURSE PRACTITIONER

## 2023-12-23 RX ORDER — NITROFURANTOIN 25; 75 MG/1; MG/1
100 CAPSULE ORAL 2 TIMES DAILY
Qty: 10 CAPSULE | Refills: 0 | Status: SHIPPED | OUTPATIENT
Start: 2023-12-23 | End: 2023-12-28

## 2023-12-23 NOTE — PATIENT INSTRUCTIONS
Dear Dot Kenny    After reviewing your responses, I've been able to diagnose you with a urinary tract infection, which is a common infection of the bladder with bacteria.  This is not a sexually transmitted infection, though urinating immediately after intercourse can help prevent infections.  Drinking lots of fluids is also helpful to clear your current infection and prevent the next one.      I have sent a prescription for antibiotics to your pharmacy to treat this infection.    It is important that you take all of your prescribed medication even if your symptoms are improving after a few doses.  Taking all of your medicine helps prevent the symptoms from returning.     If your symptoms worsen, you develop pain in your back or stomach, develop fevers, or are not improving in 5 days, please contact your primary care provider for an appointment or visit any of our convenient Walk-in or Urgent Care Centers to be seen, which can be found on our website here.    Thanks again for choosing us as your health care partner,    DENAE Curran CNP

## 2024-01-31 ENCOUNTER — E-VISIT (OUTPATIENT)
Dept: FAMILY MEDICINE | Facility: CLINIC | Age: 59
End: 2024-01-31
Payer: COMMERCIAL

## 2024-01-31 DIAGNOSIS — B96.89 ACUTE BACTERIAL SINUSITIS: Primary | ICD-10-CM

## 2024-01-31 DIAGNOSIS — J01.90 ACUTE BACTERIAL SINUSITIS: Primary | ICD-10-CM

## 2024-01-31 PROCEDURE — 99421 OL DIG E/M SVC 5-10 MIN: CPT | Performed by: FAMILY MEDICINE

## 2024-01-31 RX ORDER — DOXYCYCLINE HYCLATE 100 MG
100 TABLET ORAL 2 TIMES DAILY
Qty: 14 TABLET | Refills: 0 | Status: SHIPPED | OUTPATIENT
Start: 2024-01-31 | End: 2024-02-07

## 2024-01-31 NOTE — PATIENT INSTRUCTIONS
Dear Dot Kenny    After reviewing your responses, I've been able to diagnose you with Acute bacterial sinusitis.      Based on your responses and diagnosis, I have prescribed   Orders Placed This Encounter   Medications     doxycycline hyclate (VIBRA-TABS) 100 MG tablet     Sig: Take 1 tablet (100 mg) by mouth 2 times daily for 7 days     Dispense:  14 tablet     Refill:  0     May substitute any formulation of 100mg doxycycline available and best covered by insurance      to treat your symptoms. I have sent this to your pharmacy.?     It is also important to stay well hydrated, get lots of rest and take over-the-counter decongestants,?tylenol?or ibuprofen if you?are able to?take those medications per your primary care provider to help relieve discomfort.?     It is important that you take?all of?your prescribed medication even if your symptoms are improving after a few doses.? Taking?all of?your medicine helps prevent the symptoms from returning.?     If your symptoms worsen, you develop severe headache, vomiting, high fever (>102), or are not improving in 7 days, please contact your primary care provider for an appointment or visit any of our convenient Walk-in Care or Urgent Care Centers to be seen which can be found on our website?here.?     Thanks again for choosing?us?as your health care partner,?   ?  ARCELIA CAMACHO, DO?

## 2024-04-10 ENCOUNTER — OFFICE VISIT (OUTPATIENT)
Dept: FAMILY MEDICINE | Facility: CLINIC | Age: 59
End: 2024-04-10
Payer: COMMERCIAL

## 2024-04-10 ENCOUNTER — TELEPHONE (OUTPATIENT)
Dept: FAMILY MEDICINE | Facility: CLINIC | Age: 59
End: 2024-04-10

## 2024-04-10 ENCOUNTER — NURSE TRIAGE (OUTPATIENT)
Dept: FAMILY MEDICINE | Facility: CLINIC | Age: 59
End: 2024-04-10

## 2024-04-10 VITALS
RESPIRATION RATE: 16 BRPM | WEIGHT: 168 LBS | TEMPERATURE: 98.6 F | OXYGEN SATURATION: 94 % | SYSTOLIC BLOOD PRESSURE: 102 MMHG | HEIGHT: 64 IN | HEART RATE: 74 BPM | BODY MASS INDEX: 28.68 KG/M2 | DIASTOLIC BLOOD PRESSURE: 76 MMHG

## 2024-04-10 DIAGNOSIS — R50.9 FEVER, UNSPECIFIED FEVER CAUSE: Primary | ICD-10-CM

## 2024-04-10 LAB
DEPRECATED S PYO AG THROAT QL EIA: NEGATIVE
FLUAV AG SPEC QL IA: NEGATIVE
FLUBV AG SPEC QL IA: NEGATIVE
GROUP A STREP BY PCR: NOT DETECTED

## 2024-04-10 PROCEDURE — 87804 INFLUENZA ASSAY W/OPTIC: CPT | Performed by: FAMILY MEDICINE

## 2024-04-10 PROCEDURE — 87651 STREP A DNA AMP PROBE: CPT | Performed by: FAMILY MEDICINE

## 2024-04-10 PROCEDURE — 99213 OFFICE O/P EST LOW 20 MIN: CPT | Performed by: FAMILY MEDICINE

## 2024-04-10 ASSESSMENT — ENCOUNTER SYMPTOMS: FEVER: 1

## 2024-04-10 ASSESSMENT — PAIN SCALES - GENERAL: PAINLEVEL: SEVERE PAIN (7)

## 2024-04-10 NOTE — TELEPHONE ENCOUNTER
Called patient to get on schedule. Patient stated she thinks this is just influenza and does not want to be seen today. Advised patient to call clinic back if she changes her mind.     Kika BROWN RN  [facility]  [phone]

## 2024-04-10 NOTE — TELEPHONE ENCOUNTER
"Dr. Dumont:    This is only an update only, no need to second level triage, due to chest discomfort, the patient is being seen today in the [facility] in [place], please see documentation below.        Reason for Disposition   Chest pain(s) lasting a few seconds persists > 3 days    Answer Assessment - Initial Assessment Questions  1. LOCATION: \"Where does it hurt?\"        Patient reporting upper chest discomfort, denies central chest pain. This is only with taking in deep breath.  2. RADIATION: \"Does the pain go anywhere else?\" (e.g., into neck, jaw, arms, back)      Patient reports she has this pain but it goes in her neck/jaw, feels like it is upper chest discomfort, says her body hurts all over and in her legs as well. Says it also goes up into her throat as well.   3. ONSET: \"When did the chest pain begin?\" (Minutes, hours or days)       Started 3 days ago  4. PATTERN: \"Does the pain come and go, or has it been constant since it started?\"  \"Does it get worse with exertion?\"       Comes and goes and only happens when she takes in a deep breath.  5. DURATION: \"How long does it last\" (e.g., seconds, minutes, hours)      Brief, only happens with taking in deep breath  6. SEVERITY: \"How bad is the pain?\"  (e.g., Scale 1-10; mild, moderate, or severe)     - MILD (1-3): doesn't interfere with normal activities      - MODERATE (4-7): interferes with normal activities or awakens from sleep     - SEVERE (8-10): excruciating pain, unable to do any normal activities        Moderate 7/10  7. CARDIAC RISK FACTORS: \"Do you have any history of heart problems or risk factors for heart disease?\" (e.g., angina, prior heart attack; diabetes, high blood pressure, high cholesterol, smoker, or strong family history of heart disease)      denies  8. PULMONARY RISK FACTORS: \"Do you have any history of lung disease?\"  (e.g., blood clots in lung, asthma, emphysema, birth control pills)      denies  9. CAUSE: \"What do you think is causing the " "chest pain?\"      Patient is not sure but has a fever of , chills that started 3 days ago.  10. OTHER SYMPTOMS: \"Do you have any other symptoms?\" (e.g., dizziness, nausea, vomiting, sweating, fever, difficulty breathing, cough)        Patient denies breathing difficulties, denies congestion or runny nose. Reports she has mild cramping below her stomach that comes and goes, says she feels sluggish and slow/out of sorts. Has had a loss of appetite, she is very tired, sweats due to fever.  11. PREGNANCY: \"Is there any chance you are pregnant?\" \"When was your last menstrual period?\"        na    Protocols used: Chest Pain-A-[place]    Referral to Acute and Diagnostic Services    [phone] ([place]) [address] [address]    Transition to Acute & Diagnostic Services Clinic has been discussed with patient, and she agrees with next level of care.   Patient understands that evaluation/treatment at [facility] typically takes significantly longer than in clinic/urgent care (>2 hours).  The [facility] has been contacted by provider/staff to confirm patient acceptance.         Special issues:      None      Spoke to RAJNI Anand RN and Kika agrees with patient can be seen in the [facility] today, she will call patient to schedule.      CARLA Flores      "

## 2024-04-10 NOTE — PROGRESS NOTES
"  Assessment & Plan     (R50.9) Fever, unspecified fever cause  (primary encounter diagnosis)  Comment: Patient was reassured, labs were negative, maybe a virus. Recommend increase in fluids,rest.   Plan: Influenza A & B Antigen - Clinic Collect,         Streptococcus A Rapid Screen w/Reflex to PCR -         Clinic Collect, Group A Streptococcus PCR         Throat Swab              BMI  Estimated body mass index is 28.84 kg/m  as calculated from the following:    Height as of this encounter: 1.626 m (5' 4\").    Weight as of this encounter: 76.2 kg (168 lb).         FUTURE APPOINTMENTS:       - Follow-up visit as needed.    Lisa Chris is a 58 year old, presenting for the following health issues:    Patient is a 58 yr old patient here with fever and generalized body aches.   Ongoing for a few days. Patient has had no cough or URI symptoms. She tested for COVID at home and it was negative.  Patient has had some loss of appetite.     Fever      4/10/2024    10:16 AM   Additional Questions   Roomed by Damián CEVALLOS CMA   Accompanied by self     Temp\ at home has been   Fever  Associated symptoms include a fever.   History of Present Illness       Reason for visit:  Fever, body aches  Symptom onset:  1-3 days ago  Symptoms include:  Fever, body aches, chills, fatigue , brain fog , loss of appetite  Symptom intensity:  Moderate  Symptom progression:  Staying the same  Had these symptoms before:  Yes  Has tried/received treatment for these symptoms:  No  What makes it worse:  None  What makes it better:  Tylenol    She eats 0-1 servings of fruits and vegetables daily.She consumes 0 sweetened beverage(s) daily.She exercises with enough effort to increase her heart rate 9 or less minutes per day.  She exercises with enough effort to increase her heart rate 3 or less days per week.   She is taking medications regularly.                 Review of Systems  CONSTITUTIONAL:POSITIVE  for anorexia, fatigue, fever , and " myalgias  ENT/MOUTH: NEGATIVE for ear, mouth and throat problems  RESP: NEGATIVE for significant cough or SOB  CV: NEGATIVE for chest pain, palpitations or peripheral edema  GI: NEGATIVE for nausea, abdominal pain, heartburn, or change in bowel habits  MUSCULOSKELETAL: POSITIVE  for arthralgias  NEURO: NEGATIVE for weakness, dizziness or paresthesias  ENDOCRINE: NEGATIVE for temperature intolerance, skin/hair changes  HEME/ALLERGY/IMMUNE: NEGATIVE for bleeding problems  PSYCHIATRIC: NEGATIVE for changes in mood or affect      Objective    LMP  (LMP Unknown)   There is no height or weight on file to calculate BMI.  Physical Exam   GENERAL: alert and no distress  NECK: no adenopathy, no asymmetry, masses, or scars  RESP: lungs clear to auscultation - no rales, rhonchi or wheezes  CV: regular rate and rhythm, normal S1 S2, no S3 or S4, no murmur, click or rub, no peripheral edema  MS: no gross musculoskeletal defects noted, no edema  SKIN: no suspicious lesions or rashes    Results for orders placed or performed in visit on 04/10/24   Influenza A & B Antigen - Clinic Collect     Status: Normal    Specimen: Nose; Swab   Result Value Ref Range    Influenza A antigen Negative Negative    Influenza B antigen Negative Negative    Narrative    Test results must be correlated with clinical data. If necessary, results should be confirmed by a molecular assay or viral culture.   Streptococcus A Rapid Screen w/Reflex to PCR - Clinic Collect     Status: Normal    Specimen: Throat; Swab   Result Value Ref Range    Group A Strep antigen Negative Negative           Signed Electronically by: Jarrett Chirinos MD

## 2024-04-10 NOTE — TELEPHONE ENCOUNTER
Patient calls back, see nurse triage note today, she says she declined the ADS referral and does not feel it is emergent, would just like to be tested for influenza, patient is scheduled for appointment with Dr. Chirinos today at 10/10:20 today.        CARLA Flores

## 2024-06-25 ENCOUNTER — OFFICE VISIT (OUTPATIENT)
Dept: DERMATOLOGY | Facility: CLINIC | Age: 59
End: 2024-06-25
Payer: COMMERCIAL

## 2024-06-25 DIAGNOSIS — L82.1 SEBORRHEIC KERATOSIS: ICD-10-CM

## 2024-06-25 DIAGNOSIS — L81.4 LENTIGO: ICD-10-CM

## 2024-06-25 DIAGNOSIS — Z86.19 HISTORY OF COLD SORES: Primary | ICD-10-CM

## 2024-06-25 DIAGNOSIS — D18.01 CHERRY ANGIOMA: ICD-10-CM

## 2024-06-25 DIAGNOSIS — D22.9 MULTIPLE BENIGN NEVI: ICD-10-CM

## 2024-06-25 PROCEDURE — 99213 OFFICE O/P EST LOW 20 MIN: CPT | Performed by: PHYSICIAN ASSISTANT

## 2024-06-25 RX ORDER — VALACYCLOVIR HYDROCHLORIDE 1 G/1
2000 TABLET, FILM COATED ORAL 2 TIMES DAILY
Qty: 16 TABLET | Refills: 3 | Status: SHIPPED | OUTPATIENT
Start: 2024-06-25 | End: 2024-08-22

## 2024-06-25 ASSESSMENT — PAIN SCALES - GENERAL: PAINLEVEL: NO PAIN (0)

## 2024-06-25 NOTE — PROGRESS NOTES
Dot Kenny is an extremely pleasant 59 year old year old female patient here today for skin check. She denies any changing nevi. No painful or bleeding skin lesions. She reports that she will get cold sores with sun exposure in the summer regardless of her spf in chapstick. Patient has no other skin complaints today.  Remainder of the HPI, Meds, PMH, Allergies, FH, and SH was reviewed in chart.    Pertinent Hx:   No personal history of skin cancer. Family history of BCC  Past Medical History:   Diagnosis Date    ASCUS of cervix with negative high risk HPV 03/2016    Neg HPV       Past Surgical History:   Procedure Laterality Date    BIOPSY BREAST      BUNIONECTOMY Right 06/14/2016    Procedure: BUNIONECTOMY;  Surgeon: Jean Carlos Coleman DPM;  Location: [facility] OR    COLONOSCOPY      CYSTOSCOPY, SLING TRANSVAGINAL N/A 01/03/2023    Procedure: CREATION, VAGINAL SLING, WITH CYSTOSCOPY (Support the urethra with mesh sling and look in the bladder);  Surgeon: Brendan Catalan MD;  [place] [facility] [place]    GENITOURINARY SURGERY      REMOVE HARDWARE FOOT Right 06/05/2018    Procedure: REMOVE HARDWARE FOOT;  Removal of Hardware Right Foot;  Surgeon: Jean Carlos Coleman DPM;  Location: [facility] OR    [facility] APPENDECTOMY  2000    appendectomy         Family History   Problem Relation Age of Onset    Gastrointestinal Disease Mother         Diverticulitis    Basal cell carcinoma Mother     Hypertension Father     Depression Father     Thyroid Disease Paternal Grandfather     Asthma Brother     Diabetes No family hx of     Breast Cancer No family hx of     Cancer - colorectal No family hx of     Alcohol/Drug No family hx of     Lipids No family hx of        Social History     Socioeconomic History    Marital status:      Spouse name: Not on file    Number of children: Not on file    Years of education: Not on file    Highest education level: Not on file   Occupational History    Not on file   Tobacco Use    Smoking status: Never     Smokeless tobacco: Never   Vaping Use    Vaping status: Never Used   Substance and Sexual Activity    Alcohol use: No    Drug use: No    Sexual activity: Yes     Partners: Male     Birth control/protection: Male Surgical     Comment:  vasectomy   Other Topics Concern    Parent/sibling w/ CABG, MI or angioplasty before 65F 55M? No     Service No    Blood Transfusions No    Caffeine Concern No    Occupational Exposure No    Hobby Hazards No    Sleep Concern No    Stress Concern No    Weight Concern No    Special Diet No    Back Care No    Exercise No    Bike Helmet Not Asked    Seat Belt Yes    Self-Exams Yes   Social History Narrative    Works as a [employer]      Social Determinants of Health     Financial Resource Strain: Not on file   Food Insecurity: Not on file   Transportation Needs: Not on file   Physical Activity: Not on file   Stress: Not on file   Social Connections: Not on file   Interpersonal Safety: Low Risk  (4/10/2024)    Interpersonal Safety     Do you feel physically and emotionally safe where you currently live?: Yes     Within the past 12 months, have you been hit, slapped, kicked or otherwise physically hurt by someone?: No     Within the past 12 months, have you been humiliated or emotionally abused in other ways by your partner or ex-partner?: No   Housing Stability: Not on file       Outpatient Encounter Medications as of 6/25/2024   Medication Sig Dispense Refill    valACYclovir (VALTREX) 1000 mg tablet Take 2 tablets (2,000 mg) by mouth 2 times daily for 1 day 16 tablet 3    venlafaxine (EFFEXOR XR) 150 MG 24 hr capsule Take 1 capsule (150 mg) by mouth daily 90 capsule 3     No facility-administered encounter medications on file as of 6/25/2024.             O:   NAD, WDWN, Alert & Oriented, Mood & Affect wnl, Vitals stable   Here today alone   LMP  (LMP Unknown)    General appearance normal   Vitals stable   Alert, oriented and in no acute distress     Stuck  on papules and brown macules on trunk and ext   Red papules on trunk  Skin colored papules on back      The remainder of skin exam is normal       Eyes: Conjunctivae/lids:Normal     ENT: Lips: normal    MSK:Normal    Cardiovascular: peripheral edema none    Pulm: Breathing Normal    Neuro/Psych: Orientation:Alert and Orientedx3 ; Mood/Affect:normal   A/P:  History of cold sore   Since she get recurrent cold sores in the summer. Sent in valtrex.    Seborrheic keratosis, lentigo, angioma, benign nevi   It was a pleasure speaking to Dot Kenny today.  BENIGN LESIONS DISCUSSED WITH PATIENT:  I discussed the specifics of tumor, prognosis, and genetics of benign lesions.  I explained that treatment of these lesions would be purely cosmetic and not medically neccessary.  I discussed with patient different removal options including excision, cautery and /or laser.      Nature and genetics of benign skin lesions dicussed with patient.  Signs and Symptoms of skin cancer discussed with patient.  ABCDEs of melanoma reviewed with patient.  Patient encouraged to perform monthly skin exams.  UV precautions reviewed with patient.  Risks of non-melanoma skin cancer discussed with patient   Return to clinic in one year or sooner if needed.

## 2024-06-25 NOTE — NURSING NOTE
Chief Complaint   Patient presents with    Skin Check     No Concerns        There were no vitals filed for this visit.  Wt Readings from Last 1 Encounters:   04/10/24 76.2 kg (168 lb)       Daniela Morgan LPN .................6/25/2024

## 2024-06-25 NOTE — LETTER
6/25/2024      Dot Kenny  [address] [address]      Dear Colleague,    Thank you for referring your patient, Dot Kenny, to the [facility]. Please see a copy of my visit note below.    Dot Kenny is an extremely pleasant 59 year old year old female patient here today for skin check. She denies any changing nevi. No painful or bleeding skin lesions. She reports that she will get cold sores with sun exposure in the summer regardless of her spf in chapstick. Patient has no other skin complaints today.  Remainder of the HPI, Meds, PMH, Allergies, FH, and SH was reviewed in chart.    Pertinent Hx:   No personal history of skin cancer. Family history of BCC  Past Medical History:   Diagnosis Date     ASCUS of cervix with negative high risk HPV 03/2016    Neg HPV       Past Surgical History:   Procedure Laterality Date     BIOPSY BREAST       BUNIONECTOMY Right 06/14/2016    Procedure: BUNIONECTOMY;  Surgeon: Jean Carlos Coleman DPM;  Location: [facility] OR     COLONOSCOPY       CYSTOSCOPY, SLING TRANSVAGINAL N/A 01/03/2023    Procedure: CREATION, VAGINAL SLING, WITH CYSTOSCOPY (Support the urethra with mesh sling and look in the bladder);  Surgeon: Brendan Catalan MD;  Location: [facility] [place]     GENITOURINARY SURGERY       REMOVE HARDWARE FOOT Right 06/05/2018    Procedure: REMOVE HARDWARE FOOT;  Removal of Hardware Right Foot;  Surgeon: Jean Carlos Coleman DPM;  Location: [facility] OR     [facility] APPENDECTOMY  2000    appendectomy         Family History   Problem Relation Age of Onset     Gastrointestinal Disease Mother         Diverticulitis     Basal cell carcinoma Mother      Hypertension Father      Depression Father      Thyroid Disease Paternal Grandfather      Asthma Brother      Diabetes No family hx of      Breast Cancer No family hx of      Cancer - colorectal No family hx of      Alcohol/Drug No family hx of      Lipids No family hx of        Social History     Socioeconomic  History     Marital status:      Spouse name: Not on file     Number of children: Not on file     Years of education: Not on file     Highest education level: Not on file   Occupational History     Not on file   Tobacco Use     Smoking status: Never     Smokeless tobacco: Never   Vaping Use     Vaping status: Never Used   Substance and Sexual Activity     Alcohol use: No     Drug use: No     Sexual activity: Yes     Partners: Male     Birth control/protection: Male Surgical     Comment:  vasectomy   Other Topics Concern     Parent/sibling w/ CABG, MI or angioplasty before 65F 55M? 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[employer]      Social Determinants of Health     Financial Resource Strain: Not on file   Food Insecurity: Not on file   Transportation Needs: Not on file   Physical Activity: Not on file   Stress: Not on file   Social Connections: Not on file   Interpersonal Safety: Low Risk  (4/10/2024)    Interpersonal Safety      Do you feel physically and emotionally safe where you currently live?: Yes      Within the past 12 months, have you been hit, slapped, kicked or otherwise physically hurt by someone?: No      Within the past 12 months, have you been humiliated or emotionally abused in other ways by your partner or ex-partner?: No   Housing Stability: Not on file       Outpatient Encounter Medications as of 6/25/2024   Medication Sig Dispense Refill     valACYclovir (VALTREX) 1000 mg tablet Take 2 tablets (2,000 mg) by mouth 2 times daily for 1 day 16 tablet 3     venlafaxine (EFFEXOR XR) 150 MG 24 hr capsule Take 1 capsule (150 mg) by mouth daily 90 capsule 3     No  facility-administered encounter medications on file as of 6/25/2024.             O:   NAD, WDWN, Alert & Oriented, Mood & Affect wnl, Vitals stable   Here today alone   LMP  (LMP Unknown)    General appearance normal   Vitals stable   Alert, oriented and in no acute distress     Stuck on papules and brown macules on trunk and ext   Red papules on trunk  Skin colored papules on back      The remainder of skin exam is normal       Eyes: Conjunctivae/lids:Normal     ENT: Lips: normal    MSK:Normal    Cardiovascular: peripheral edema none    Pulm: Breathing Normal    Neuro/Psych: Orientation:Alert and Orientedx3 ; Mood/Affect:normal   A/P:  History of cold sore   Since she get recurrent cold sores in the summer. Sent in valtrex.    Seborrheic keratosis, lentigo, angioma, benign nevi   It was a pleasure speaking to Dot Kenny today.  BENIGN LESIONS DISCUSSED WITH PATIENT:  I discussed the specifics of tumor, prognosis, and genetics of benign lesions.  I explained that treatment of these lesions would be purely cosmetic and not medically neccessary.  I discussed with patient different removal options including excision, cautery and /or laser.      Nature and genetics of benign skin lesions dicussed with patient.  Signs and Symptoms of skin cancer discussed with patient.  ABCDEs of melanoma reviewed with patient.  Patient encouraged to perform monthly skin exams.  UV precautions reviewed with patient.  Risks of non-melanoma skin cancer discussed with patient   Return to clinic in one year or sooner if needed.      Again, thank you for allowing me to participate in the care of your patient.        Sincerely,        Antoinette Peñaloza PA-C

## 2024-07-11 ENCOUNTER — PATIENT OUTREACH (OUTPATIENT)
Dept: CARE COORDINATION | Facility: CLINIC | Age: 59
End: 2024-07-11
Payer: COMMERCIAL

## 2024-07-16 ENCOUNTER — PATIENT OUTREACH (OUTPATIENT)
Dept: CARE COORDINATION | Facility: CLINIC | Age: 59
End: 2024-07-16
Payer: COMMERCIAL

## 2024-08-11 DIAGNOSIS — N95.1 MENOPAUSAL FLUSHING: ICD-10-CM

## 2024-08-15 RX ORDER — VENLAFAXINE HYDROCHLORIDE 150 MG/1
150 CAPSULE, EXTENDED RELEASE ORAL DAILY
Qty: 90 CAPSULE | Refills: 0 | Status: SHIPPED | OUTPATIENT
Start: 2024-08-15 | End: 2024-08-22

## 2024-08-15 NOTE — TELEPHONE ENCOUNTER
Patient calling regarding status of this. Please call when sent. [phone].    GELACIO Macias

## 2024-08-20 SDOH — HEALTH STABILITY: PHYSICAL HEALTH: ON AVERAGE, HOW MANY MINUTES DO YOU ENGAGE IN EXERCISE AT THIS LEVEL?: 30 MIN

## 2024-08-20 SDOH — HEALTH STABILITY: PHYSICAL HEALTH: ON AVERAGE, HOW MANY DAYS PER WEEK DO YOU ENGAGE IN MODERATE TO STRENUOUS EXERCISE (LIKE A BRISK WALK)?: 1 DAY

## 2024-08-20 ASSESSMENT — SOCIAL DETERMINANTS OF HEALTH (SDOH): HOW OFTEN DO YOU GET TOGETHER WITH FRIENDS OR RELATIVES?: TWICE A WEEK

## 2024-08-22 ENCOUNTER — OFFICE VISIT (OUTPATIENT)
Dept: FAMILY MEDICINE | Facility: CLINIC | Age: 59
End: 2024-08-22
Payer: COMMERCIAL

## 2024-08-22 ENCOUNTER — HOSPITAL ENCOUNTER (OUTPATIENT)
Dept: MAMMOGRAPHY | Facility: CLINIC | Age: 59
Discharge: HOME | End: 2024-08-22
Attending: FAMILY MEDICINE | Admitting: FAMILY MEDICINE
Payer: COMMERCIAL

## 2024-08-22 VITALS
SYSTOLIC BLOOD PRESSURE: 104 MMHG | HEIGHT: 64 IN | BODY MASS INDEX: 31.76 KG/M2 | WEIGHT: 186 LBS | DIASTOLIC BLOOD PRESSURE: 74 MMHG | OXYGEN SATURATION: 94 % | RESPIRATION RATE: 16 BRPM | HEART RATE: 73 BPM | TEMPERATURE: 97 F

## 2024-08-22 DIAGNOSIS — Z12.31 VISIT FOR SCREENING MAMMOGRAM: ICD-10-CM

## 2024-08-22 DIAGNOSIS — Z00.00 ROUTINE GENERAL MEDICAL EXAMINATION AT A HEALTH CARE FACILITY: Primary | ICD-10-CM

## 2024-08-22 DIAGNOSIS — Z86.19 HISTORY OF COLD SORES: ICD-10-CM

## 2024-08-22 DIAGNOSIS — N95.1 MENOPAUSAL FLUSHING: ICD-10-CM

## 2024-08-22 DIAGNOSIS — Z13.228 SCREENING FOR ENDOCRINE, METABOLIC AND IMMUNITY DISORDER: ICD-10-CM

## 2024-08-22 DIAGNOSIS — Z13.220 LIPID SCREENING: ICD-10-CM

## 2024-08-22 DIAGNOSIS — Z13.0 SCREENING FOR ENDOCRINE, METABOLIC AND IMMUNITY DISORDER: ICD-10-CM

## 2024-08-22 DIAGNOSIS — Z12.4 CERVICAL CANCER SCREENING: ICD-10-CM

## 2024-08-22 DIAGNOSIS — Z01.84 IMMUNITY STATUS TESTING: ICD-10-CM

## 2024-08-22 DIAGNOSIS — Z13.29 SCREENING FOR ENDOCRINE, METABOLIC AND IMMUNITY DISORDER: ICD-10-CM

## 2024-08-22 LAB
ANION GAP SERPL CALCULATED.3IONS-SCNC: 8 MMOL/L (ref 7–15)
BUN SERPL-MCNC: 15.7 MG/DL (ref 6–20)
CALCIUM SERPL-MCNC: 9.2 MG/DL (ref 8.8–10.4)
CHLORIDE SERPL-SCNC: 103 MMOL/L (ref 98–107)
CHOLEST SERPL-MCNC: 228 MG/DL
CREAT SERPL-MCNC: 0.98 MG/DL (ref 0.51–0.95)
EGFRCR SERPLBLD CKD-EPI 2021: 67 ML/MIN/1.73M2
FASTING STATUS PATIENT QL REPORTED: YES
FASTING STATUS PATIENT QL REPORTED: YES
GLUCOSE SERPL-MCNC: 101 MG/DL (ref 70–99)
HBV SURFACE AB SERPL IA-ACNC: <3.5 M[IU]/ML
HBV SURFACE AB SERPL IA-ACNC: NONREACTIVE M[IU]/ML
HCO3 SERPL-SCNC: 28 MMOL/L (ref 22–29)
HDLC SERPL-MCNC: 68 MG/DL
LDLC SERPL CALC-MCNC: 135 MG/DL
NONHDLC SERPL-MCNC: 160 MG/DL
POTASSIUM SERPL-SCNC: 4.1 MMOL/L (ref 3.4–5.3)
SODIUM SERPL-SCNC: 139 MMOL/L (ref 135–145)
TRIGL SERPL-MCNC: 124 MG/DL
TSH SERPL DL<=0.005 MIU/L-ACNC: 3.7 UIU/ML (ref 0.3–4.2)

## 2024-08-22 PROCEDURE — 87624 HPV HI-RISK TYP POOLED RSLT: CPT | Performed by: FAMILY MEDICINE

## 2024-08-22 PROCEDURE — 36415 COLL VENOUS BLD VENIPUNCTURE: CPT | Performed by: FAMILY MEDICINE

## 2024-08-22 PROCEDURE — 86706 HEP B SURFACE ANTIBODY: CPT | Performed by: FAMILY MEDICINE

## 2024-08-22 PROCEDURE — 80061 LIPID PANEL: CPT | Performed by: FAMILY MEDICINE

## 2024-08-22 PROCEDURE — 99396 PREV VISIT EST AGE 40-64: CPT | Performed by: FAMILY MEDICINE

## 2024-08-22 PROCEDURE — 77063 BREAST TOMOSYNTHESIS BI: CPT

## 2024-08-22 PROCEDURE — G0145 SCR C/V CYTO,THINLAYER,RESCR: HCPCS | Performed by: FAMILY MEDICINE

## 2024-08-22 PROCEDURE — 80048 BASIC METABOLIC PNL TOTAL CA: CPT | Performed by: FAMILY MEDICINE

## 2024-08-22 PROCEDURE — 84443 ASSAY THYROID STIM HORMONE: CPT | Performed by: FAMILY MEDICINE

## 2024-08-22 PROCEDURE — 99459 PELVIC EXAMINATION: CPT | Performed by: FAMILY MEDICINE

## 2024-08-22 RX ORDER — VALACYCLOVIR HYDROCHLORIDE 1 G/1
2000 TABLET, FILM COATED ORAL 2 TIMES DAILY
Qty: 16 TABLET | Refills: 3 | Status: SHIPPED | OUTPATIENT
Start: 2024-08-22

## 2024-08-22 RX ORDER — VENLAFAXINE HYDROCHLORIDE 75 MG/1
75 CAPSULE, EXTENDED RELEASE ORAL DAILY
Qty: 30 CAPSULE | Refills: 2 | Status: SHIPPED | OUTPATIENT
Start: 2024-08-22

## 2024-08-22 ASSESSMENT — PAIN SCALES - GENERAL: PAINLEVEL: NO PAIN (0)

## 2024-08-22 NOTE — NURSING NOTE
"Initial /74   Pulse 73   Temp 97  F (36.1  C) (Tympanic)   Resp 16   Ht 1.626 m (5' 4\")   Wt 84.4 kg (186 lb)   LMP  (LMP Unknown)   SpO2 94%   BMI 31.93 kg/m   Estimated body mass index is 31.93 kg/m  as calculated from the following:    Height as of this encounter: 1.626 m (5' 4\").    Weight as of this encounter: 84.4 kg (186 lb). .    "

## 2024-08-22 NOTE — PROGRESS NOTES
"Preventive Care Visit  [facility]  Mayito Dumont MD, Family Medicine  Aug 22, 2024      Assessment & Plan     Routine general medical examination at a health care facility    Menopausal flushing  Far enough out now from menopause that she like to try getting off of the venlafaxine.  Reduce dose to 75 mg.  She can message me if any issues with tapering we can go by smaller increments or slower if needed.  - venlafaxine (EFFEXOR XR) 75 MG 24 hr capsule; Take 1 capsule (75 mg) by mouth daily.    History of cold sores  Well controlled. Refilled medication.     - valACYclovir (VALTREX) 1000 mg tablet; Take 2 tablets (2,000 mg) by mouth 2 times daily.    Lipid screening  - Lipid panel reflex to direct LDL Fasting; Future    Screening for endocrine, metabolic and immunity disorder  - Basic metabolic panel; Future  - TSH with free T4 reflex; Future    Immunity status testing  - Hepatitis B Surface Antibody; Future    Cervical cancer screening  - Pap Screen with HPV - Recommended Age 30 - 65 Years    Patient has been advised of split billing requirements and indicates understanding: Yes        BMI  Estimated body mass index is 31.93 kg/m  as calculated from the following:    Height as of this encounter: 1.626 m (5' 4\").    Weight as of this encounter: 84.4 kg (186 lb).       Counseling  Appropriate preventive services were addressed with this patient via screening, questionnaire, or discussion as appropriate for fall prevention, nutrition, physical activity, Tobacco-use cessation, social engagement, weight loss and cognition.  Checklist reviewing preventive services available has been given to the patient.  Reviewed patient's diet, addressing concerns and/or questions.   She is at risk for lack of exercise and has been provided with information to increase physical activity for the benefit of her well-being.           Lisa Chris is a 59 year old, presenting for the " following:  Physical        Health Care Directive  Patient has a Health Care Directive on file      HPI              8/20/2024   General Health   How would you rate your overall physical health? Good   Feel stress (tense, anxious, or unable to sleep) Not at all            8/20/2024   Nutrition   Three or more servings of calcium each day? (!) I DON'T KNOW   Diet: Regular (no restrictions)   How many servings of fruit and vegetables per day? (!) 2-3   How many sweetened beverages each day? 0-1            8/20/2024   Exercise   Days per week of moderate/strenous exercise 1 day   Average minutes spent exercising at this level 30 min      (!) EXERCISE CONCERN      8/20/2024   Social Factors   Frequency of gathering with friends or relatives Twice a week   Worry food won't last until get money to buy more No   Food not last or not have enough money for food? No   Do you have housing? (Housing is defined as stable permanent housing and does not include staying ouside in a car, in a tent, in an abandoned building, in an overnight shelter, or couch-surfing.) Yes   Are you worried about losing your housing? No   Lack of transportation? No   Unable to get utilities (heat,electricity)? No            8/22/2024   Fall Risk   Gait Speed Test Interpretation Less than or equal to 5.00 seconds - PASS              8/20/2024   Dental   Dentist two times every year? Yes            8/20/2024   TB Screening   Were you born outside of the [place]? No                  8/20/2024   Substance Use   Alcohol more than 3/day or more than 7/wk No   Do you use any other substances recreationally? No        Social History     Tobacco Use    Smoking status: Never    Smokeless tobacco: Never   Vaping Use    Vaping status: Never Used   Substance Use Topics    Alcohol use: No    Drug use: No           8/17/2022   LAST FHS-7 RESULTS   1st degree relative breast or ovarian cancer No   Any relative bilateral breast cancer No   Any male have breast cancer No    Any ONE woman have BOTH breast AND ovarian cancer No   Any woman with breast cancer before 50yrs No   2 or more relatives with breast AND/OR ovarian cancer No   2 or more relatives with breast AND/OR bowel cancer No           Mammogram Screening - Mammogram every 1-2 years updated in Health Maintenance based on mutual decision making        8/20/2024   STI Screening   New sexual partner(s) since last STI/HIV test? No        History of abnormal Pap smear: No - age 30- 64 PAP with HPV every 5 years recommended        Latest Ref Rng & Units 8/8/2019     9:15 AM 8/8/2019     9:06 AM 3/17/2016     9:00 AM   PAP / HPV   PAP (Historical)   NIL     HPV 16 DNA NEG^Negative Negative   Negative    HPV 18 DNA NEG^Negative Negative   Negative    Other HR HPV NEG^Negative Negative   Negative      ASCVD Risk   The 10-year ASCVD risk score (Jones ALVAREZ, et al., 2019) is: 1.1%    Values used to calculate the score:      Age: 59 years      Sex: Female      Is Non- : No      Diabetic: No      Tobacco smoker: No      Systolic Blood Pressure: 104 mmHg      Is BP treated: No      HDL Cholesterol: 73 mg/dL      Total Cholesterol: 175 mg/dL           Reviewed and updated as needed this visit by Provider   Tobacco  Allergies  Meds  Problems  Med Hx  Surg Hx  Fam Hx            Past Medical History:   Diagnosis Date    ASCUS of cervix with negative high risk HPV 03/2016    Neg HPV     Past Surgical History:   Procedure Laterality Date    BIOPSY BREAST      BUNIONECTOMY Right 06/14/2016    Procedure: BUNIONECTOMY;  Surgeon: Jean Carlos Coleman DPM;  Location: [facility] OR    COLONOSCOPY      CYSTOSCOPY, SLING TRANSVAGINAL N/A 01/03/2023    Procedure: CREATION, VAGINAL SLING, WITH CYSTOSCOPY (Support the urethra with mesh sling and look in the bladder);  Surgeon: Brendan Catalan MD;  [place] [facility] [place]    GENITOURINARY SURGERY      REMOVE HARDWARE FOOT Right 06/05/2018    Procedure: REMOVE HARDWARE FOOT;   Removal of Hardware Right Foot;  Surgeon: Jean Carlos Coleman DPM;  Location: [facility] OR    [facility] APPENDECTOMY  2000    appendectomy      Labs reviewed in EPIC  Patient Active Problem List   Diagnosis    CARDIOVASCULAR SCREENING; LDL GOAL LESS THAN 160    Dysmenorrhea    Female stress incontinence    Menopausal flushing    Family history of atrial fibrillation     Past Surgical History:   Procedure Laterality Date    BIOPSY BREAST      BUNIONECTOMY Right 06/14/2016    Procedure: BUNIONECTOMY;  Surgeon: Jean Carlos Coleman DPM;  Location: [facility] OR    COLONOSCOPY      CYSTOSCOPY, SLING TRANSVAGINAL N/A 01/03/2023    Procedure: CREATION, VAGINAL SLING, WITH CYSTOSCOPY (Support the urethra with mesh sling and look in the bladder);  Surgeon: Brendan Catalan MD;  [place] [facility] [place]    GENITOURINARY SURGERY      REMOVE HARDWARE FOOT Right 06/05/2018    Procedure: REMOVE HARDWARE FOOT;  Removal of Hardware Right Foot;  Surgeon: Jean Carlos Coleman DPM;  Location: [facility] OR    [facility] APPENDECTOMY  2000    appendectomy        Social History     Tobacco Use    Smoking status: Never    Smokeless tobacco: Never   Substance Use Topics    Alcohol use: No     Family History   Problem Relation Age of Onset    Gastrointestinal Disease Mother         Diverticulitis    Basal cell carcinoma Mother     Hypertension Father     Depression Father     Thyroid Disease Paternal Grandfather     Asthma Brother     Diabetes No family hx of     Breast Cancer No family hx of     Cancer - colorectal No family hx of     Alcohol/Drug No family hx of     Lipids No family hx of          Current Outpatient Medications   Medication Sig Dispense Refill    valACYclovir (VALTREX) 1000 mg tablet Take 2 tablets (2,000 mg) by mouth 2 times daily. 16 tablet 3    venlafaxine (EFFEXOR XR) 75 MG 24 hr capsule Take 1 capsule (75 mg) by mouth daily. 30 capsule 2     Allergies   Allergen Reactions    Cefzil [Cefprozil]      rash    Sulfa Antibiotics      Rash           Review  "of Systems  Constitutional, neuro, ENT, endocrine, pulmonary, cardiac, gastrointestinal, genitourinary, musculoskeletal, integument and psychiatric systems are negative, except as otherwise noted.     Objective    Exam  /74   Pulse 73   Temp 97  F (36.1  C) (Tympanic)   Resp 16   Ht 1.626 m (5' 4\")   Wt 84.4 kg (186 lb)   LMP  (LMP Unknown)   SpO2 94%   BMI 31.93 kg/m     Estimated body mass index is 31.93 kg/m  as calculated from the following:    Height as of this encounter: 1.626 m (5' 4\").    Weight as of this encounter: 84.4 kg (186 lb).    Physical Exam  GENERAL: alert and no distress  EYES: Eyes grossly normal to inspection, PERRL and conjunctivae and sclerae normal  HENT: normal cephalic/atraumatic and ear canals and TM's normal  NECK: no adenopathy, no asymmetry, masses, or scars  RESP: lungs clear to auscultation - no rales, rhonchi or wheezes  BREAST: normal without masses, tenderness or nipple discharge and no palpable axillary masses or adenopathy  CV: regular rate and rhythm, normal S1 S2, no S3 or S4, no murmur, click or rub, no peripheral edema  ABDOMEN: soft, nontender, no hepatosplenomegaly, no masses and bowel sounds normal   (female) w/bimanual: normal female external genitalia, normal urethral meatus, normal vaginal mucosa, and normal cervix/adnexa/uterus without masses or discharge  MS: no gross musculoskeletal defects noted, no edema  SKIN: no suspicious lesions or rashes  NEURO: Normal strength and tone, mentation intact and speech normal  PSYCH: mentation appears normal, affect normal/bright        Signed Electronically by: Mayito Dumont MD    "

## 2024-08-22 NOTE — PATIENT INSTRUCTIONS
Patient Education   Preventive Care Advice   This is general advice given by our system to help you stay healthy. However, your care team may have specific advice just for you. Please talk to your care team about your preventive care needs.  Nutrition  Eat 5 or more servings of fruits and vegetables each day.  Try wheat bread, brown rice and whole grain pasta (instead of white bread, rice, and pasta).  Get enough calcium and vitamin D. Check the label on foods and aim for 100% of the RDA (recommended daily allowance).  Lifestyle  Exercise at least 150 minutes each week  (30 minutes a day, 5 days a week).  Do muscle strengthening activities 2 days a week. These help control your weight and prevent disease.  No smoking.  Wear sunscreen to prevent skin cancer.  Have a dental exam and cleaning every 6 months.  Yearly exams  See your health care team every year to talk about:  Any changes in your health.  Any medicines your care team has prescribed.  Preventive care, family planning, and ways to prevent chronic diseases.  Shots (vaccines)   HPV shots (up to age 26), if you've never had them before.  Hepatitis B shots (up to age 59), if you've never had them before.  COVID-19 shot: Get this shot when it's due.  Flu shot: Get a flu shot every year.  Tetanus shot: Get a tetanus shot every 10 years.  Pneumococcal, hepatitis A, and RSV shots: Ask your care team if you need these based on your risk.  Shingles shot (for age 50 and up)  General health tests  Diabetes screening:  Starting at age 35, Get screened for diabetes at least every 3 years.  If you are younger than age 35, ask your care team if you should be screened for diabetes.  Cholesterol test: At age 39, start having a cholesterol test every 5 years, or more often if advised.  Bone density scan (DEXA): At age 50, ask your care team if you should have this scan for osteoporosis (brittle bones).  Hepatitis C: Get tested at least once in your life.  STIs (sexually  transmitted infections)  Before age 24: Ask your care team if you should be screened for STIs.  After age 24: Get screened for STIs if you're at risk. You are at risk for STIs (including HIV) if:  You are sexually active with more than one person.  You don't use condoms every time.  You or a partner was diagnosed with a sexually transmitted infection.  If you are at risk for HIV, ask about PrEP medicine to prevent HIV.  Get tested for HIV at least once in your life, whether you are at risk for HIV or not.  Cancer screening tests  Cervical cancer screening: If you have a cervix, begin getting regular cervical cancer screening tests starting at age 21.  Breast cancer scan (mammogram): If you've ever had breasts, begin having regular mammograms starting at age 40. This is a scan to check for breast cancer.  Colon cancer screening: It is important to start screening for colon cancer at age 45.  Have a colonoscopy test every 10 years (or more often if you're at risk) Or, ask your provider about stool tests like a FIT test every year or Cologuard test every 3 years.  To learn more about your testing options, visit:   .  For help making a decision, visit:   https://bit.ly/yf28897.  Prostate cancer screening test: If you have a prostate, ask your care team if a prostate cancer screening test (PSA) at age 55 is right for you.  Lung cancer screening: If you are a current or former smoker ages 50 to 80, ask your care team if ongoing lung cancer screenings are right for you.  For informational purposes only. Not to replace the advice of your health care provider. Copyright   2023 [facility]. All rights reserved. Clinically reviewed by the [facility]. [employer] 012625 - REV 01/24.  Preventing Falls: Care Instructions  Injuries and health problems such as trouble walking or poor eyesight can increase your risk of falling. So can some medicines. But there are things you can do to help  "prevent falls. You can exercise to get stronger. You can also arrange your home to make it safer.    Talk to your doctor about the medicines you take. Ask if any of them increase the risk of falls and whether they can be changed or stopped.   Try to exercise regularly. It can help improve your strength and balance. This can help lower your risk of falling.     Practice fall safety and prevention.    Wear low-heeled shoes that fit well and give your feet good support. Talk to your doctor if you have foot problems that make this hard.  Carry a cellphone or wear a medical alert device that you can use to call for help.  Use stepladders instead of chairs to reach high objects. Don't climb if you're at risk for falls. Ask for help, if needed.  Wear the correct eyeglasses, if you need them.    Make your home safer.    Remove rugs, cords, clutter, and furniture from walkways.  Keep your house well lit. Use night-lights in hallways and bathrooms.  Install and use sturdy handrails on stairways.  Wear nonskid footwear, even inside. Don't walk barefoot or in socks without shoes.    Be safe outside.    Use handrails, curb cuts, and ramps whenever possible.  Keep your hands free by using a shoulder bag or backpack.  Try to walk in well-lit areas. Watch out for uneven ground, changes in pavement, and debris.  Be careful in the winter. Walk on the grass or gravel when sidewalks are slippery. Use de-icer on steps and walkways. Add non-slip devices to shoes.    Put grab bars and nonskid mats in your shower or tub and near the toilet. Try to use a shower chair or bath bench when bathing.   Get into a tub or shower by putting in your weaker leg first. Get out with your strong side first. Have a phone or medical alert device in the bathroom with you.   Where can you learn more?  Go to https://[email or web address].net/patiented  Enter G117 in the search box to learn more about \"Preventing Falls: Care Instructions.\"  Current as of: July 17, " 2023               Content Version: 14.0    2390-3683 [employer].   Care instructions adapted under license by your healthcare professional. If you have questions about a medical condition or this instruction, always ask your healthcare professional. [employer] disclaims any warranty or liability for your use of this information.

## 2024-08-23 LAB
HPV HR 12 DNA CVX QL NAA+PROBE: NEGATIVE
HPV16 DNA CVX QL NAA+PROBE: NEGATIVE
HPV18 DNA CVX QL NAA+PROBE: NEGATIVE
HUMAN PAPILLOMA VIRUS FINAL DIAGNOSIS: NORMAL

## 2024-08-27 LAB
BKR AP ASSOCIATED HPV REPORT: NORMAL
BKR LAB AP GYN ADEQUACY: NORMAL
BKR LAB AP GYN INTERPRETATION: NORMAL
BKR LAB AP PREVIOUS ABNORMAL: NORMAL
PATH REPORT.COMMENTS IMP SPEC: NORMAL
PATH REPORT.COMMENTS IMP SPEC: NORMAL
PATH REPORT.RELEVANT HX SPEC: NORMAL

## 2024-09-23 ENCOUNTER — MYC MEDICAL ADVICE (OUTPATIENT)
Dept: FAMILY MEDICINE | Facility: CLINIC | Age: 59
End: 2024-09-23
Payer: COMMERCIAL

## 2024-09-23 DIAGNOSIS — N95.1 MENOPAUSAL FLUSHING: Primary | ICD-10-CM

## 2024-09-25 ENCOUNTER — MYC MEDICAL ADVICE (OUTPATIENT)
Dept: FAMILY MEDICINE | Facility: CLINIC | Age: 59
End: 2024-09-25
Payer: COMMERCIAL

## 2024-09-25 RX ORDER — VENLAFAXINE HYDROCHLORIDE 37.5 MG/1
37.5 CAPSULE, EXTENDED RELEASE ORAL DAILY
Qty: 30 CAPSULE | Refills: 0 | Status: SHIPPED | OUTPATIENT
Start: 2024-09-25

## 2024-09-25 NOTE — TELEPHONE ENCOUNTER
See [employer] message, pt says that she hasn't taken venlafaxine for 5 days, asks if she should stay off. Routing to PCP for review.    Rosibel Williamson RN  [facility]

## 2025-07-23 ENCOUNTER — PATIENT OUTREACH (OUTPATIENT)
Dept: CARE COORDINATION | Facility: CLINIC | Age: 60
End: 2025-07-23
Payer: COMMERCIAL

## (undated) DEVICE — CATH FOLEY 18FR 5ML SILICONE LUBRI-SIL 175818

## (undated) DEVICE — DRAPE EXTREMITY W/ARMBOARD 29405

## (undated) DEVICE — LIGHT HANDLE X1 31140133

## (undated) DEVICE — BLADE SAW OSCIL/SAG STRK MICRO 9.0X18.5X0.38MM 2296-003-105

## (undated) DEVICE — SYR EAR BULB 3OZ 0035830

## (undated) DEVICE — GLOVE PROTEXIS MICRO 7.0  2D73PM70

## (undated) DEVICE — DRAPE SHEET REV FOLD 3/4 9349

## (undated) DEVICE — PACK CYSTO CUSTOM ASC

## (undated) DEVICE — GLOVE PROTEXIS BLUE W/NEU-THERA 8.0  2D73EB80

## (undated) DEVICE — BNDG ELASTIC 4"X5YDS UNSTERILE 6611-40

## (undated) DEVICE — NDL 18GA 1.5" 305196

## (undated) DEVICE — DECANTER VIAL 2006S

## (undated) DEVICE — PREP CHLORAPREP 26ML TINTED ORANGE  260815

## (undated) DEVICE — SOL NACL 0.9% IRRIG 500ML BOTTLE 2F7123

## (undated) DEVICE — GLOVE PROTEXIS BLUE W/NEU-THERA 7.5  2D73EB75

## (undated) DEVICE — DRAPE UNDER BUTTOCK 8483

## (undated) DEVICE — DRSG GAUZE 4X4" TRAY

## (undated) DEVICE — GOWN XLG DISP 9545

## (undated) DEVICE — LINEN TOWEL PACK X5 5464

## (undated) DEVICE — SU VICRYL 2-0 SH 27" UND J417H

## (undated) DEVICE — PAD CHUX UNDERPAD 30X30"

## (undated) DEVICE — GLOVE PROTEXIS W/NEU-THERA 8.0  2D73TE80

## (undated) DEVICE — RETR RING LONE STAR 28.3X18.3CM W/CATH CLIPSX2 3308G

## (undated) DEVICE — COVER CAMERA IN-LIGHT DISP LT-C02

## (undated) DEVICE — PREP PAD ALCOHOL 6818

## (undated) DEVICE — RETR ELASTIC STAYS LONE STAR SHARP 5MM 8/PACK 3311-8G

## (undated) DEVICE — TUBING SUCTION MEDI-VAC 1/4"X20' N620A

## (undated) DEVICE — SU VICRYL 3-0 SH 27" UND J416H

## (undated) DEVICE — NDL COUNTER 20CT 31142493

## (undated) DEVICE — SYR 10ML LL W/O NDL

## (undated) DEVICE — PACK EXTREMITY LATEX FREE SOP32HFFCS

## (undated) DEVICE — DRAPE STOCKINETTE IMPERVIOUS 12" 1587

## (undated) DEVICE — CAST PADDING 4" STERILE 9044S

## (undated) DEVICE — SU ETHILON 4-0 PS-2 18" 1667G

## (undated) DEVICE — SOL NACL 0.9% INJ 1000ML BAG 2B1324X

## (undated) DEVICE — SUCTION MANIFOLD NEPTUNE 2 SYS 4 PORT 0702-020-000

## (undated) DEVICE — NDL 25GA 1.5" 305127

## (undated) RX ORDER — CEFAZOLIN SODIUM 1 G/3ML
INJECTION, POWDER, FOR SOLUTION INTRAMUSCULAR; INTRAVENOUS
Status: DISPENSED
Start: 2023-01-03

## (undated) RX ORDER — ONDANSETRON 2 MG/ML
INJECTION INTRAMUSCULAR; INTRAVENOUS
Status: DISPENSED
Start: 2018-06-05

## (undated) RX ORDER — FENTANYL CITRATE 50 UG/ML
INJECTION, SOLUTION INTRAMUSCULAR; INTRAVENOUS
Status: DISPENSED
Start: 2023-01-03

## (undated) RX ORDER — GINSENG 100 MG
CAPSULE ORAL
Status: DISPENSED
Start: 2018-06-05

## (undated) RX ORDER — DEXAMETHASONE SODIUM PHOSPHATE 10 MG/ML
INJECTION, SOLUTION INTRAMUSCULAR; INTRAVENOUS
Status: DISPENSED
Start: 2018-06-05

## (undated) RX ORDER — KETOROLAC TROMETHAMINE 30 MG/ML
INJECTION, SOLUTION INTRAMUSCULAR; INTRAVENOUS
Status: DISPENSED
Start: 2018-06-05

## (undated) RX ORDER — HYDROXYZINE HYDROCHLORIDE 25 MG/1
TABLET, FILM COATED ORAL
Status: DISPENSED
Start: 2018-06-05

## (undated) RX ORDER — ACETAMINOPHEN 325 MG/1
TABLET ORAL
Status: DISPENSED
Start: 2023-01-03

## (undated) RX ORDER — FENTANYL CITRATE 50 UG/ML
INJECTION, SOLUTION INTRAMUSCULAR; INTRAVENOUS
Status: DISPENSED
Start: 2018-06-05

## (undated) RX ORDER — ONDANSETRON 2 MG/ML
INJECTION INTRAMUSCULAR; INTRAVENOUS
Status: DISPENSED
Start: 2023-01-03

## (undated) RX ORDER — PROPOFOL 10 MG/ML
INJECTION, EMULSION INTRAVENOUS
Status: DISPENSED
Start: 2020-06-11

## (undated) RX ORDER — PROPOFOL 10 MG/ML
INJECTION, EMULSION INTRAVENOUS
Status: DISPENSED
Start: 2023-01-03

## (undated) RX ORDER — ACETAMINOPHEN 325 MG/1
TABLET ORAL
Status: DISPENSED
Start: 2018-06-05

## (undated) RX ORDER — GABAPENTIN 300 MG/1
CAPSULE ORAL
Status: DISPENSED
Start: 2018-06-05

## (undated) RX ORDER — PROPOFOL 10 MG/ML
INJECTION, EMULSION INTRAVENOUS
Status: DISPENSED
Start: 2018-06-05

## (undated) RX ORDER — LIDOCAINE HYDROCHLORIDE 10 MG/ML
INJECTION, SOLUTION EPIDURAL; INFILTRATION; INTRACAUDAL; PERINEURAL
Status: DISPENSED
Start: 2018-06-05

## (undated) RX ORDER — BUPIVACAINE HYDROCHLORIDE 2.5 MG/ML
INJECTION, SOLUTION INFILTRATION; PERINEURAL
Status: DISPENSED
Start: 2018-06-05

## (undated) RX ORDER — CLINDAMYCIN PHOSPHATE 900 MG/50ML
INJECTION, SOLUTION INTRAVENOUS
Status: DISPENSED
Start: 2018-06-05

## (undated) RX ORDER — LIDOCAINE HYDROCHLORIDE 10 MG/ML
INJECTION, SOLUTION INFILTRATION; PERINEURAL
Status: DISPENSED
Start: 2018-06-05